# Patient Record
Sex: MALE | Race: WHITE | NOT HISPANIC OR LATINO | URBAN - METROPOLITAN AREA
[De-identification: names, ages, dates, MRNs, and addresses within clinical notes are randomized per-mention and may not be internally consistent; named-entity substitution may affect disease eponyms.]

---

## 2017-01-31 ENCOUNTER — ON CAMPUS - OUTPATIENT (AMBULATORY)
Dept: URBAN - METROPOLITAN AREA HOSPITAL 2 | Facility: HOSPITAL | Age: 72
End: 2017-01-31
Payer: COMMERCIAL

## 2017-01-31 ENCOUNTER — OFFICE (AMBULATORY)
Dept: URBAN - METROPOLITAN AREA CLINIC 64 | Facility: CLINIC | Age: 72
End: 2017-01-31
Payer: COMMERCIAL

## 2017-01-31 VITALS
SYSTOLIC BLOOD PRESSURE: 112 MMHG | SYSTOLIC BLOOD PRESSURE: 144 MMHG | SYSTOLIC BLOOD PRESSURE: 121 MMHG | WEIGHT: 200 LBS | SYSTOLIC BLOOD PRESSURE: 157 MMHG | DIASTOLIC BLOOD PRESSURE: 85 MMHG | DIASTOLIC BLOOD PRESSURE: 77 MMHG | HEART RATE: 86 BPM | OXYGEN SATURATION: 97 % | OXYGEN SATURATION: 98 % | SYSTOLIC BLOOD PRESSURE: 113 MMHG | OXYGEN SATURATION: 99 % | DIASTOLIC BLOOD PRESSURE: 75 MMHG | DIASTOLIC BLOOD PRESSURE: 82 MMHG | SYSTOLIC BLOOD PRESSURE: 125 MMHG | RESPIRATION RATE: 16 BRPM | HEART RATE: 78 BPM | HEART RATE: 72 BPM | TEMPERATURE: 97.6 F | HEART RATE: 77 BPM | RESPIRATION RATE: 18 BRPM | DIASTOLIC BLOOD PRESSURE: 83 MMHG | RESPIRATION RATE: 14 BRPM | DIASTOLIC BLOOD PRESSURE: 68 MMHG | OXYGEN SATURATION: 96 % | DIASTOLIC BLOOD PRESSURE: 74 MMHG | SYSTOLIC BLOOD PRESSURE: 132 MMHG | HEART RATE: 84 BPM | HEART RATE: 79 BPM | SYSTOLIC BLOOD PRESSURE: 110 MMHG | HEART RATE: 90 BPM | HEART RATE: 94 BPM | HEIGHT: 70 IN

## 2017-01-31 DIAGNOSIS — D12.2 BENIGN NEOPLASM OF ASCENDING COLON: ICD-10-CM

## 2017-01-31 DIAGNOSIS — Z86.010 PERSONAL HISTORY OF COLONIC POLYPS: ICD-10-CM

## 2017-01-31 DIAGNOSIS — K64.8 OTHER HEMORRHOIDS: ICD-10-CM

## 2017-01-31 DIAGNOSIS — K57.30 DIVERTICULOSIS OF LARGE INTESTINE WITHOUT PERFORATION OR ABS: ICD-10-CM

## 2017-01-31 LAB
GI HISTOLOGY: A. UNSPECIFIED: (no result)
GI HISTOLOGY: PDF REPORT: (no result)

## 2017-01-31 PROCEDURE — 45385 COLONOSCOPY W/LESION REMOVAL: CPT | Mod: 33 | Performed by: INTERNAL MEDICINE

## 2017-01-31 PROCEDURE — 88305 TISSUE EXAM BY PATHOLOGIST: CPT | Performed by: INTERNAL MEDICINE

## 2017-01-31 RX ADMIN — PROPOFOL: 10 INJECTION, EMULSION INTRAVENOUS at 14:25

## 2017-05-04 ENCOUNTER — HOSPITAL ENCOUNTER (OUTPATIENT)
Dept: ORTHOPEDIC SURGERY | Facility: CLINIC | Age: 72
Discharge: HOME | End: 2017-05-04
Attending: ORTHOPAEDIC SURGERY | Admitting: ORTHOPAEDIC SURGERY

## 2017-05-31 ENCOUNTER — HOSPITAL ENCOUNTER (OUTPATIENT)
Dept: ORTHOPEDIC SURGERY | Facility: CLINIC | Age: 72
Discharge: HOME | End: 2017-05-31
Attending: ORTHOPAEDIC SURGERY | Admitting: ORTHOPAEDIC SURGERY

## 2017-06-06 ENCOUNTER — HOSPITAL ENCOUNTER (OUTPATIENT)
Dept: CARDIOLOGY | Facility: HOSPITAL | Age: 72
Discharge: HOME | End: 2017-06-06
Attending: ORTHOPAEDIC SURGERY | Admitting: ORTHOPAEDIC SURGERY

## 2017-06-06 LAB
ANION GAP SERPL CALC-SCNC: 12.9 MMOL/L (ref 10–20)
BACTERIA SPEC AEROBE CULT: NORMAL
BASOPHILS # BLD AUTO: 0.1 10*3/UL (ref 0–0.2)
BASOPHILS NFR BLD AUTO: 2 % (ref 0–2)
BUN SERPL-MCNC: 20 MG/DL (ref 8–20)
BUN/CREAT SERPL: 16.7 (ref 6.2–20.3)
CALCIUM SERPL-MCNC: 9.5 MG/DL (ref 8.9–10.3)
CHLORIDE SERPL-SCNC: 100 MMOL/L (ref 101–111)
CONV CO2: 28 MMOL/L (ref 22–32)
CREAT UR-MCNC: 1.2 MG/DL (ref 0.7–1.2)
DIFFERENTIAL METHOD BLD: (no result)
EOSINOPHIL # BLD AUTO: 0.1 10*3/UL (ref 0–0.3)
EOSINOPHIL # BLD AUTO: 2 % (ref 0–3)
ERYTHROCYTE [DISTWIDTH] IN BLOOD BY AUTOMATED COUNT: 14.8 % (ref 11.5–14.5)
GLUCOSE SERPL-MCNC: 107 MG/DL (ref 65–99)
HCT VFR BLD AUTO: 47.1 % (ref 40–54)
HGB BLD-MCNC: 16.2 G/DL (ref 14–18)
LYMPHOCYTES # BLD AUTO: 1.4 10*3/UL (ref 0.8–4.8)
LYMPHOCYTES NFR BLD AUTO: 27 % (ref 18–42)
Lab: NORMAL
MCH RBC QN AUTO: 31.5 PG (ref 26–32)
MCHC RBC AUTO-ENTMCNC: 34.3 G/DL (ref 32–36)
MCV RBC AUTO: 91.8 FL (ref 80–94)
MICRO REPORT STATUS: NORMAL
MONOCYTES # BLD AUTO: 0.5 10*3/UL (ref 0.1–1.3)
MONOCYTES NFR BLD AUTO: 10 % (ref 2–11)
NEUTROPHILS # BLD AUTO: 3.1 10*3/UL (ref 2.3–8.6)
NEUTROPHILS NFR BLD AUTO: 59 % (ref 50–75)
NRBC BLD AUTO-RTO: 0 /100{WBCS}
NRBC/RBC NFR BLD MANUAL: 0 10*3/UL
PLATELET # BLD AUTO: 116 10*3/UL (ref 150–450)
PMV BLD AUTO: 8.9 FL (ref 7.4–10.4)
POTASSIUM SERPL-SCNC: 4.9 MMOL/L (ref 3.6–5.1)
RBC # BLD AUTO: 5.13 10*6/UL (ref 4.6–6)
SODIUM SERPL-SCNC: 136 MMOL/L (ref 136–144)
SPECIMEN SOURCE: NORMAL
WBC # BLD AUTO: 5.2 10*3/UL (ref 4.5–11.5)

## 2017-06-09 ENCOUNTER — HOSPITAL ENCOUNTER (OUTPATIENT)
Dept: PREOP | Facility: HOSPITAL | Age: 72
Setting detail: HOSPITAL OUTPATIENT SURGERY
Discharge: HOME | End: 2017-06-09
Attending: ORTHOPAEDIC SURGERY | Admitting: ORTHOPAEDIC SURGERY

## 2017-08-14 ENCOUNTER — HOSPITAL ENCOUNTER (OUTPATIENT)
Dept: ONCOLOGY | Facility: CLINIC | Age: 72
Discharge: HOME | End: 2017-08-14
Attending: FAMILY MEDICINE | Admitting: FAMILY MEDICINE

## 2017-10-10 ENCOUNTER — CONVERSION ENCOUNTER (OUTPATIENT)
Dept: ORTHOPEDIC SURGERY | Facility: CLINIC | Age: 72
End: 2017-10-10

## 2017-11-02 ENCOUNTER — HOSPITAL ENCOUNTER (OUTPATIENT)
Dept: OTHER | Facility: HOSPITAL | Age: 72
Discharge: HOME | End: 2017-11-02
Attending: FAMILY MEDICINE | Admitting: FAMILY MEDICINE

## 2018-02-19 ENCOUNTER — HOSPITAL ENCOUNTER (OUTPATIENT)
Dept: OTHER | Facility: HOSPITAL | Age: 73
Discharge: HOME | End: 2018-02-19
Attending: INTERNAL MEDICINE | Admitting: INTERNAL MEDICINE

## 2018-02-20 ENCOUNTER — HOSPITAL ENCOUNTER (OUTPATIENT)
Dept: OTHER | Facility: HOSPITAL | Age: 73
Discharge: HOME | End: 2018-02-20
Attending: INTERNAL MEDICINE | Admitting: INTERNAL MEDICINE

## 2018-05-21 ENCOUNTER — HOSPITAL ENCOUNTER (OUTPATIENT)
Dept: ORTHOPEDIC SURGERY | Facility: CLINIC | Age: 73
Discharge: HOME | End: 2018-05-21
Attending: ORTHOPAEDIC SURGERY | Admitting: ORTHOPAEDIC SURGERY

## 2018-08-28 ENCOUNTER — HOSPITAL ENCOUNTER (OUTPATIENT)
Dept: OTHER | Facility: HOSPITAL | Age: 73
Discharge: HOME | End: 2018-08-28
Attending: ORTHOPAEDIC SURGERY | Admitting: ORTHOPAEDIC SURGERY

## 2018-08-28 LAB
ANION GAP SERPL CALC-SCNC: 13.3 MMOL/L (ref 10–20)
BACTERIA SPEC AEROBE CULT: NORMAL
BASOPHILS # BLD AUTO: 0.1 10*3/UL (ref 0–0.2)
BASOPHILS NFR BLD AUTO: 2 % (ref 0–2)
BUN SERPL-MCNC: 16 MG/DL (ref 8–20)
BUN/CREAT SERPL: 14.5 (ref 6.2–20.3)
CALCIUM SERPL-MCNC: 9.8 MG/DL (ref 8.9–10.3)
CHLORIDE SERPL-SCNC: 102 MMOL/L (ref 101–111)
CONV CO2: 27 MMOL/L (ref 22–32)
CREAT UR-MCNC: 1.1 MG/DL (ref 0.7–1.2)
DIFFERENTIAL METHOD BLD: (no result)
EOSINOPHIL # BLD AUTO: 0.1 10*3/UL (ref 0–0.3)
EOSINOPHIL # BLD AUTO: 3 % (ref 0–3)
ERYTHROCYTE [DISTWIDTH] IN BLOOD BY AUTOMATED COUNT: 14.5 % (ref 11.5–14.5)
GLUCOSE SERPL-MCNC: 86 MG/DL (ref 65–99)
HCT VFR BLD AUTO: 44.5 % (ref 40–54)
HGB BLD-MCNC: 15.7 G/DL (ref 14–18)
LYMPHOCYTES # BLD AUTO: 1.5 10*3/UL (ref 0.8–4.8)
LYMPHOCYTES NFR BLD AUTO: 30 % (ref 18–42)
Lab: NORMAL
MCH RBC QN AUTO: 32.2 PG (ref 26–32)
MCHC RBC AUTO-ENTMCNC: 35.2 G/DL (ref 32–36)
MCV RBC AUTO: 91.5 FL (ref 80–94)
MICRO REPORT STATUS: NORMAL
MONOCYTES # BLD AUTO: 0.7 10*3/UL (ref 0.1–1.3)
MONOCYTES NFR BLD AUTO: 13 % (ref 2–11)
NEUTROPHILS # BLD AUTO: 2.6 10*3/UL (ref 2.3–8.6)
NEUTROPHILS NFR BLD AUTO: 52 % (ref 50–75)
NRBC BLD AUTO-RTO: 0 /100{WBCS}
NRBC/RBC NFR BLD MANUAL: 0 10*3/UL
PLATELET # BLD AUTO: 123 10*3/UL (ref 150–450)
PMV BLD AUTO: 9.3 FL (ref 7.4–10.4)
POTASSIUM SERPL-SCNC: 4.3 MMOL/L (ref 3.6–5.1)
RBC # BLD AUTO: 4.87 10*6/UL (ref 4.6–6)
SODIUM SERPL-SCNC: 138 MMOL/L (ref 136–144)
SPECIMEN SOURCE: NORMAL
WBC # BLD AUTO: 5 10*3/UL (ref 4.5–11.5)

## 2018-09-18 ENCOUNTER — HOSPITAL ENCOUNTER (OUTPATIENT)
Dept: PREOP | Facility: HOSPITAL | Age: 73
Setting detail: HOSPITAL OUTPATIENT SURGERY
Discharge: HOME | End: 2018-09-18
Attending: ORTHOPAEDIC SURGERY | Admitting: ORTHOPAEDIC SURGERY

## 2018-11-27 ENCOUNTER — HOSPITAL ENCOUNTER (OUTPATIENT)
Dept: ORTHOPEDIC SURGERY | Facility: CLINIC | Age: 73
Discharge: HOME | End: 2018-11-27
Attending: PHYSICIAN ASSISTANT | Admitting: PHYSICIAN ASSISTANT

## 2019-01-31 ENCOUNTER — HOSPITAL ENCOUNTER (OUTPATIENT)
Dept: ORTHOPEDIC SURGERY | Facility: CLINIC | Age: 74
Discharge: HOME | End: 2019-01-31
Attending: PHYSICIAN ASSISTANT | Admitting: PHYSICIAN ASSISTANT

## 2019-02-11 ENCOUNTER — HOSPITAL ENCOUNTER (OUTPATIENT)
Dept: PHYSICAL THERAPY | Facility: HOSPITAL | Age: 74
Setting detail: RECURRING SERIES
Discharge: HOME | End: 2019-04-01
Attending: PHYSICIAN ASSISTANT | Admitting: PHYSICIAN ASSISTANT

## 2019-05-21 ENCOUNTER — CONVERSION ENCOUNTER (OUTPATIENT)
Dept: ORTHOPEDIC SURGERY | Facility: CLINIC | Age: 74
End: 2019-05-21

## 2019-05-21 LAB
ALBUMIN SERPL-MCNC: 4.4 G/DL (ref 3.6–5.1)
ALBUMIN/GLOB SERPL: ABNORMAL {RATIO} (ref 1–2.5)
ALP SERPL-CCNC: 64 UNITS/L (ref 40–115)
ALT SERPL-CCNC: 26 UNITS/L (ref 9–46)
AST SERPL-CCNC: 27 UNITS/L (ref 10–35)
BILIRUB SERPL-MCNC: 0.5 MG/DL (ref 0.2–1.2)
BUN SERPL-MCNC: 18 MG/DL (ref 7–25)
BUN/CREAT SERPL: ABNORMAL (ref 6–22)
CALCIUM SERPL-MCNC: 9.4 MG/DL (ref 8.6–10.3)
CHLORIDE SERPL-SCNC: 104 MMOL/L (ref 98–110)
CO2 CONTENT VENOUS: 25 MMOL/L (ref 20–32)
CONV TOTAL PROTEIN: 7.1 G/DL (ref 6.1–8.1)
CREAT UR-MCNC: 1 MG/DL (ref 0.7–1.18)
GLOBULIN UR ELPH-MCNC: ABNORMAL G/DL (ref 1.9–3.7)
GLUCOSE SERPL-MCNC: 64 MG/DL (ref 65–99)
POTASSIUM SERPL-SCNC: 4.3 MMOL/L (ref 3.5–5.3)
SODIUM SERPL-SCNC: 139 MMOL/L (ref 135–146)

## 2019-06-07 ENCOUNTER — HOSPITAL ENCOUNTER (OUTPATIENT)
Dept: ONCOLOGY | Facility: HOSPITAL | Age: 74
Discharge: HOME | End: 2019-06-07
Attending: FAMILY MEDICINE | Admitting: FAMILY MEDICINE

## 2019-06-25 ENCOUNTER — OFFICE VISIT (OUTPATIENT)
Dept: ORTHOPEDIC SURGERY | Facility: CLINIC | Age: 74
End: 2019-06-25

## 2019-06-25 VITALS
SYSTOLIC BLOOD PRESSURE: 107 MMHG | HEART RATE: 70 BPM | HEIGHT: 71 IN | WEIGHT: 206 LBS | BODY MASS INDEX: 28.84 KG/M2 | DIASTOLIC BLOOD PRESSURE: 68 MMHG

## 2019-06-25 DIAGNOSIS — M25.561 ACUTE PAIN OF RIGHT KNEE: Primary | ICD-10-CM

## 2019-06-25 DIAGNOSIS — M17.12 PRIMARY OSTEOARTHRITIS OF LEFT KNEE: ICD-10-CM

## 2019-06-25 DIAGNOSIS — Z96.651 STATUS POST TOTAL KNEE REPLACEMENT, RIGHT: ICD-10-CM

## 2019-06-25 PROCEDURE — 99213 OFFICE O/P EST LOW 20 MIN: CPT | Performed by: ORTHOPAEDIC SURGERY

## 2019-06-25 RX ORDER — METOPROLOL TARTRATE 50 MG/1
TABLET, FILM COATED ORAL EVERY 12 HOURS
COMMUNITY
Start: 2018-07-02 | End: 2019-11-18 | Stop reason: SDUPTHER

## 2019-06-25 RX ORDER — MINOXIDIL 2 %
SOLUTION, NON-ORAL TOPICAL
COMMUNITY
Start: 2014-11-13 | End: 2019-07-12

## 2019-06-25 RX ORDER — LEVOTHYROXINE SODIUM 0.2 MG/1
TABLET ORAL EVERY 24 HOURS
COMMUNITY
Start: 2018-03-10 | End: 2019-11-18 | Stop reason: SDUPTHER

## 2019-06-25 RX ORDER — TRAMADOL HYDROCHLORIDE 50 MG/1
50 TABLET ORAL EVERY 6 HOURS PRN
COMMUNITY
End: 2019-06-25 | Stop reason: SDUPTHER

## 2019-06-25 RX ORDER — DUTASTERIDE 0.5 MG/1
1 CAPSULE, LIQUID FILLED ORAL EVERY 24 HOURS
COMMUNITY
Start: 2018-09-17 | End: 2020-03-18 | Stop reason: SDUPTHER

## 2019-06-25 RX ORDER — TAMSULOSIN HYDROCHLORIDE 0.4 MG/1
0.4 CAPSULE ORAL DAILY
COMMUNITY
End: 2019-11-18 | Stop reason: SDUPTHER

## 2019-06-25 RX ORDER — ATORVASTATIN CALCIUM 40 MG/1
40 TABLET, FILM COATED ORAL DAILY
COMMUNITY
End: 2019-07-10

## 2019-06-25 RX ORDER — TRAMADOL HYDROCHLORIDE 50 MG/1
50 TABLET ORAL
Qty: 40 TABLET | Refills: 0 | OUTPATIENT
Start: 2019-06-25 | End: 2019-11-06

## 2019-06-25 RX ORDER — GABAPENTIN 400 MG/1
300 CAPSULE ORAL
COMMUNITY
End: 2019-07-10

## 2019-06-25 NOTE — PROGRESS NOTES
Chief Complaint   Patient presents with   • Right Knee - Follow-up   • Follow-up     right TKR 1/16/19           HPI the patient is following up on right total knee arthroplasty which was performed on 2 January 2019.  The patient initially had quite a bit of pain and discomfort on the medial aspect of the knee.  Things are gotten progressively better.  His range of motion has definitely improved with physical therapy.  He feels a lot better than before.  He still has quite a bit of pain especially at night but this pain wakes him up and causes him to have a sense of stiffness.  I have reassured the patient that this is indeed an normal consequent after major surgery such as knee replacement.  His mobility has improved a lot and he is able to walk outside and exercise without any difficulty.  He was very dissatisfied with the physical therapy that he received after the surgical intervention.  He feels that he was turned over to a student who really did not know the details of the physical therapy protocol and therefore he has taken a longer time during his recovery process.  He is a former runner and states that his left knee has also started to bother him to some degree.  I have recommended to the patient that we should wait prior to knee replacement surgery on the left side for as long as possible.  At this point I do not feel that he is symptomatic enough to require surgical consideration on his left knee.  I would most likely recommend nonoperative management to his radiograph findings become more involved and he is clinically hurting more persistently all the time.  We have discussed intra-articular steroid injections as well as Visco supplementation for the joint for management of his symptoms but.        Vitals:    06/25/19 1244   BP: 107/68   Pulse: 70           Review of Systems   Constitutional: Negative.    HENT: Negative.    Eyes: Negative.    Respiratory: Negative.    Cardiovascular: Negative.     Gastrointestinal: Negative.    Endocrine: Negative.    Genitourinary: Negative.    Musculoskeletal: Positive for gait problem and joint swelling.   Skin: Negative.    Allergic/Immunologic: Negative.    Hematological: Negative.    Psychiatric/Behavioral: Negative.            Physical Exam   Constitutional: He is oriented to person, place, and time. He appears well-nourished.   HENT:   Head: Atraumatic.   Eyes: EOM are normal.   Neck: Neck supple.   Cardiovascular: Normal heart sounds and intact distal pulses.   Pulmonary/Chest: Effort normal and breath sounds normal.   Abdominal: Bowel sounds are normal.   Musculoskeletal: He exhibits edema and tenderness.   Neurological: He is alert and oriented to person, place, and time.   Skin: Skin is warm. Capillary refill takes 2 to 3 seconds.   Psychiatric: He has a normal mood and affect. His behavior is normal.   Nursing note and vitals reviewed.          Joint/Body Part Specific Exam:right knee.The patient is status post total knee arthroplasty postoperative 6 month(s). Incision is clean. Calf is soft and nontender. Homans sign is negative. There is no clicking, popping or catching. Anterior and posterior drawer signs are negative.  There is no instability of the components. Appropriate amounts of swelling and bruising are noted. Dorsalis pedis and posterior tibial artery pulses are palpable. Common peroneal nerve function is well preserved. Range of motion is from 0-125 degrees of flexion. Gait is cautious but otherwise fairly normal. There is no evidence of a deep seated joint infection.    Left knee. Patient has crepitus throughout range of motion. Positive patellar grind test. Mild effusion. Lachman is negative. Pivot shift is negative. Anterior and posterior drawer signs are negative. Significant joint line tenderness is noted on the medial aspect of the knee. Patient has a varus orientation of the knee. There is fullness and tenderness in the Popliteal fossa. Mild  distention of a Popliteal cyst is noted in this location. Range of motion in flexion is from 0-125 degrees. Neurovascular status is intact.  Dorsalis pedis and posterior tibial artery pulses are palpable. Common peroneal nerve function is well preserved. Patient's gait is cautious and antalgic. Skin and soft tissues are mildly swollen, consistent with synovitis and effusion. The patient has a significant limp with the first few steps after starting the gait cycle. Getting out of a chair takes a lot of effort due to pain on knee flexion.    X-RAY Report:bilateral Knee X-Ray  Indication: Evaluation of pain on the medial aspect of the left knee and evaluation of the implant on the right knee following total knee replacement.  AP, Lateral views  Findings: Well placed implants with a good cement mantle without any subsidence of the implants.  Early degenerative change of the medial compartment of the left knee.  no bony lesion  Soft tissues within normal limits  decreased joint spaces  Hardware appropriately positioned yes      yes prior studies available for comparison.    This patient's x-ray report was graded according to the Kellgren and Valentin classification.  This took into account the joint space narrowing, osteophyte formation, sclerosis of the distal femur/proximal tibia along with deformity of those bones.  The findings were indicative of K L grade 2 on the left knee.    X-RAY was ordered and reviewed by Ashish Crain MD        Diagnostics:        Shai was seen today for follow-up and follow-up.    Diagnoses and all orders for this visit:    Acute pain of right knee  -     XR Knee 3 View Right    Status post total knee replacement, right    Primary osteoarthritis of left knee    Other orders  -     traMADol (ULTRAM) 50 MG tablet; Take 1 tablet by mouth every night at bedtime.            Procedures        Plan: Stretching something exercises of the quad and hamstring.    Use a supportive brace to the knee to  prevent it from buckling and giving out.    Ice to the knee for comfort.    Calcium and vitamin D for bone health.    Intra-articular steroid injection and Synvisc Visco supplementation discussed with the patient for management of left knee symptoms.    , GI and dental procedure protected with antibiotic to prevent metastatic infection to the right knee otherwise implants.    He will eventually need sided knee replacement but that would be a last resort in terms of surgical intervention.    Follow-up in my office in 6-month for evaluation.    Tablet  IbuProfen 600 g orally daily for pain and discomfort    Tablet Ultram 50 mg tab 1 p.o. nightly for nighttime rectal pain and discomfort.    Controlled substance treatment options discussed in detail. Patient's signed consent to medical options on file. GAVIN form in chart.  The patient is being provided this narcotic prescription to address the acute medical condition that they are undergoing/experiencing at this time.  It is my medical and surgical assessment that more than 3 days of narcotic medication is necessary to help control the pain and discomfort that this patient is experiencing at this point.  Risks of narcotic medication usage outlined.  Possibility of physical and psychological dependence and abuse, especially long term, emphasized to the patient.  I have explained to the patient that we will try to wean them off the narcotic medication as soon as possible and introduce non-narcotic modalities of pain control.  At this point in the patient's clinical spectrum, however, alternative available treatments are inadequate to control their pain and symptoms.  I have also discussed the possibility of random drug testing as well as pill counts to prevent misuse and misappropriation of the narcotic medications prescribed to the patient.

## 2019-07-12 ENCOUNTER — OFFICE VISIT (OUTPATIENT)
Dept: FAMILY MEDICINE CLINIC | Facility: CLINIC | Age: 74
End: 2019-07-12

## 2019-07-12 VITALS
DIASTOLIC BLOOD PRESSURE: 78 MMHG | HEART RATE: 70 BPM | RESPIRATION RATE: 18 BRPM | OXYGEN SATURATION: 95 % | HEIGHT: 71 IN | SYSTOLIC BLOOD PRESSURE: 135 MMHG | TEMPERATURE: 98.1 F | BODY MASS INDEX: 28.9 KG/M2 | WEIGHT: 206.4 LBS

## 2019-07-12 DIAGNOSIS — M54.81 OCCIPITAL NEURALGIA OF LEFT SIDE: Primary | ICD-10-CM

## 2019-07-12 DIAGNOSIS — M54.81 OCCIPITAL NEURALGIA OF LEFT SIDE: ICD-10-CM

## 2019-07-12 PROCEDURE — 99213 OFFICE O/P EST LOW 20 MIN: CPT | Performed by: FAMILY MEDICINE

## 2019-07-12 RX ORDER — PREDNISONE 10 MG/1
TABLET ORAL
Qty: 40 TABLET | Refills: 0 | Status: SHIPPED | OUTPATIENT
Start: 2019-07-12 | End: 2019-11-06

## 2019-07-12 RX ORDER — BACLOFEN 10 MG/1
10 TABLET ORAL 3 TIMES DAILY
Qty: 90 TABLET | Refills: 0 | Status: SHIPPED | OUTPATIENT
Start: 2019-07-12 | End: 2019-08-08 | Stop reason: SDUPTHER

## 2019-07-12 RX ORDER — BACLOFEN 10 MG/1
TABLET ORAL
Qty: 270 TABLET | Refills: 0 | OUTPATIENT
Start: 2019-07-12

## 2019-07-12 NOTE — PROGRESS NOTES
Headache    This is a new problem. The current episode started 1 to 4 weeks ago. The problem occurs constantly. The problem has been unchanged. The pain is located in the occipital region. The pain quality is not similar to prior headaches. The quality of the pain is described as squeezing and pulsating. The pain is moderate. Pertinent negatives include no back pain, coughing or dizziness. The treatment provided mild relief.     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 However diet modification and compliance with medication is discussed with the patient. Patient is advised to have periodic AST,  ALT, and lipid panel testing through your office.    • Hypertension 05/01/2013    Benign. 12-14-18- patient's blood pressure is within desireable ranges. At this time, I would not recommend any change in antihypertensive regimen. However, patient is advised to check the blood pressure periodically at home and bring the logbook on next visit. Patient is also encouraged to increase aerobic activities as tolerated. Risk factor modification is discussed.    • Hypothyroidism    • Hypothyroidism 05/01/2013   • Impotence of organic origin    • Insomnia    • Internal derangement of knee 05/05/2014 6-9-14- He saw Dr. Sams and Dr. Concepcion. He had fluid drained from his knee with negative cultures and negative crystals. Both were not concerned about the MRI (report not avilable), but he has a radiologist friend who read it as having meniscal damage. He is offered referral back to the orthopedist of his choice and will thnk about who he wants to see. He has upcoming shoulder surgery.    • Knee pain, right 05/04/2017   • Knee pain, right 04/03/2017   • Low back pain    • Medication monitoring encounter 04/19/2019   • Meningioma (CMS/HCC) 06/09/2014   • Nasal polyp 10/06/2017   • Neoplasm of uncertain behavior 10/11/2018   • Neoplasm of uncertain behavior of skin 05/04/2015   • Neuropathy 12/18/2014   • No known drug allergy    • Obesity    • Orthopedic aftercare 06/12/2017   • Overweight 12/12/2017   • Pain in joint of right ankle 05/31/2017   • Pain management     [place] [facility]    • Palpitations 12/22/2017    3-16-18- his symptom of palpitation have improved. Continue beta-blockers.    • Pre-op exam 12/18/2018   • Prostatic hypertrophy 06/09/2014    Benign   • Puncture wound without foreign body of left hand, initial encounter 10/16/2018   • Rash 06/28/2017   • Rotator cuff tear 05/19/2014    left   • Shortness of breath 02/02/2018   • Sinus tachycardia 03/16/2018 12-14-18- much better. Continue current treat.     • Tobacco use      Past Surgical History:   Procedure Laterality Date   • ANKLE HARDWARE REMOVAL Right    • APPENDECTOMY     • KNEE ARTHROSCOPY  06/09/2017    and 12/5/2014. Dr Gallego. With partial medial and lateral miniscectomy.    • KNEE ARTHROSCOPY Right 06/09/2017   • KNEE ARTHROSCOPY W/ MENISCECTOMY Right 09/18/2018    right knee scope/meniscectomy   • LUMBAR DISC SURGERY  11/11/2015    Right L3-4 diskectomy. Dr. Pollard.    • ROTATOR CUFF REPAIR  07/2014    Dr Gallego.   • TONSILLECTOMY     • TOTAL KNEE ARTHROPLASTY Right 01/16/2019    Right TKR   • TOTAL KNEE ARTHROPLASTY Right 01/16/2019    Dr. Crain     Family History   Problem Relation Age of Onset   • Diabetes Mother    • Stroke Mother    • Other Mother         Thyroid Disease   • Heart disease Mother         Ischemic   • Hypertension Father    • Heart disease Father         Ischemic   • Hyperlipidemia Father      Social History     Tobacco Use   • Smoking status: Former Smoker   • Smokeless tobacco: Never Used   Substance Use Topics   • Alcohol use: Yes     Review of Systems   Constitutional: Negative for chills and fatigue.   Respiratory: Negative for cough and shortness of breath.    Cardiovascular: Negative for chest pain and palpitations.   Musculoskeletal: Negative for arthralgias, back pain and myalgias.   Skin: Negative for rash.   Neurological: Positive for headache. Negative for dizziness.     Physical Exam   Constitutional: He is oriented to person, place, and time. He appears well-developed and well-nourished. No distress.   Cardiovascular: Normal rate, regular rhythm and normal heart sounds.   No murmur heard.  Pulmonary/Chest: Effort normal and breath sounds normal. He has no wheezes. He has no rales.   Abdominal: Soft. Bowel sounds are normal. He exhibits no distension.   Musculoskeletal: Normal range of motion.   Neurological: He is alert and oriented to person, place, and time. He has normal strength. No cranial nerve deficit or sensory  deficit. He displays a negative Romberg sign. Coordination and gait normal.   Skin: Skin is warm and dry.   Psychiatric: He has a normal mood and affect. His behavior is normal.     No visits with results within 7 Day(s) from this visit.   Latest known visit with results is:   Conversion Encounter on 05/21/2019   Component Date Value Ref Range Status   • Albumin 05/21/2019 4.4  3.6 - 5.1 g/dL Final   • Alkaline Phosphatase 05/21/2019 64  40 - 115 units/L Final   • Total Bilirubin 05/21/2019 0.5  0.2 - 1.2 mg/dL Final   • BUN 05/21/2019 18  7 - 25 mg/dL Final   • BUN/Creatinine Ratio 05/21/2019 NOT APPLICABLE (calc)  6 - 22 Final   • Calcium 05/21/2019 9.4  8.6 - 10.3 mg/dL Final   • Chloride 05/21/2019 104  98 - 110 mmol/L Final   • CO2 CONTENT  05/21/2019 25  20 - 32 mmol/L Final   • Creatinine 05/21/2019 1.00  0.70 - 1.18 mg/dL Final   • eGFR African Am 05/21/2019 75  > OR = 60 mL/min/1.73m2 Final   • eGFR Non African Am 05/21/2019 87  > OR = 60 mL/min/1.73m2 Final   • Globulin 05/21/2019 2.7 G/DL (CALC)  1.9 - 3.7 g/dL Final   • Glucose 05/21/2019 64* 65 - 99 mg/dL Final   • Potassium 05/21/2019 4.3  3.5 - 5.3 mmol/L Final   • Total Protein 05/21/2019 7.1  6.1 - 8.1 g/dL Final   • AST (SGOT) 05/21/2019 27  10 - 35 units/L Final   • ALT (SGPT) 05/21/2019 26  9 - 46 units/L Final   • Sodium 05/21/2019 139  135 - 146 mmol/L Final   • A/G Ratio 05/21/2019 1.6 (calc)  1.0 - 2.5 Final         Diagnoses and all orders for this visit:    1. Occipital neuralgia of left side (Primary)  Assessment & Plan:  Will start steroids and muscle relaxers and encouraged him to continue heat with massage. Warning signs discussed and patient was instructed on how to reach me after hours and when to seek urgent or emergent care.     Orders:  -     baclofen (LIORESAL) 10 MG tablet; Take 1 tablet by mouth 3 (Three) Times a Day.  Dispense: 90 tablet; Refill: 0  -     predniSONE (DELTASONE) 10 MG tablet; Take 4 po qam x 4d, then 3 po qam x  4d, then 2 po qam x 4 d, then 1 po qam x 4 d  Dispense: 40 tablet; Refill: 0

## 2019-07-14 PROBLEM — M23.303 DERANGEMENT OF MEDIAL MENISCUS OF RIGHT KNEE: Status: ACTIVE | Noted: 2017-05-31

## 2019-07-14 PROBLEM — R13.10 DYSPHAGIA: Status: ACTIVE | Noted: 2017-11-01

## 2019-07-14 PROBLEM — M19.91 PRIMARY LOCALIZED OSTEOARTHRITIS: Status: ACTIVE | Noted: 2018-03-26

## 2019-07-14 PROBLEM — E66.3 OVERWEIGHT: Status: ACTIVE | Noted: 2017-12-12

## 2019-07-14 PROBLEM — J33.9 NASAL POLYP: Status: ACTIVE | Noted: 2017-10-06

## 2019-07-14 PROBLEM — Z96.651 PRESENCE OF RIGHT ARTIFICIAL KNEE JOINT: Status: ACTIVE | Noted: 2019-01-24

## 2019-07-14 NOTE — ASSESSMENT & PLAN NOTE
Will start steroids and muscle relaxers and encouraged him to continue heat with massage. Warning signs discussed and patient was instructed on how to reach me after hours and when to seek urgent or emergent care.

## 2019-08-08 DIAGNOSIS — M54.81 OCCIPITAL NEURALGIA OF LEFT SIDE: ICD-10-CM

## 2019-08-08 RX ORDER — BACLOFEN 10 MG/1
TABLET ORAL
Qty: 90 TABLET | Refills: 0 | Status: SHIPPED | OUTPATIENT
Start: 2019-08-08 | End: 2019-11-06

## 2019-08-13 RX ORDER — TRAMADOL HYDROCHLORIDE 50 MG/1
TABLET ORAL
Qty: 40 TABLET | Refills: 0 | OUTPATIENT
Start: 2019-08-13

## 2019-08-28 ENCOUNTER — TELEPHONE (OUTPATIENT)
Dept: FAMILY MEDICINE CLINIC | Facility: CLINIC | Age: 74
End: 2019-08-28

## 2019-08-29 DIAGNOSIS — E78.5 HYPERLIPIDEMIA, UNSPECIFIED HYPERLIPIDEMIA TYPE: Primary | ICD-10-CM

## 2019-08-29 DIAGNOSIS — E78.5 HYPERLIPIDEMIA, UNSPECIFIED HYPERLIPIDEMIA TYPE: ICD-10-CM

## 2019-08-30 ENCOUNTER — TELEPHONE (OUTPATIENT)
Dept: FAMILY MEDICINE CLINIC | Facility: CLINIC | Age: 74
End: 2019-08-30

## 2019-08-30 DIAGNOSIS — R53.83 FATIGUE, UNSPECIFIED TYPE: Primary | ICD-10-CM

## 2019-08-30 DIAGNOSIS — Z12.5 SCREENING PSA (PROSTATE SPECIFIC ANTIGEN): ICD-10-CM

## 2019-08-30 DIAGNOSIS — E78.5 HYPERLIPIDEMIA, UNSPECIFIED HYPERLIPIDEMIA TYPE: ICD-10-CM

## 2019-09-04 LAB
CHOLEST SERPL-MCNC: 163 MG/DL (ref 100–199)
HDLC SERPL-MCNC: 35 MG/DL
LDLC SERPL CALC-MCNC: 87 MG/DL (ref 0–99)
PSA SERPL-MCNC: 0.7 NG/ML (ref 0–4)
TESTOST FREE SERPL-MCNC: 22.9 PG/ML (ref 6.6–18.1)
TESTOST SERPL-MCNC: 250.5 NG/DL (ref 264–916)
TRIGL SERPL-MCNC: 206 MG/DL (ref 0–149)
VLDLC SERPL CALC-MCNC: 41 MG/DL (ref 5–40)

## 2019-09-13 ENCOUNTER — TELEPHONE (OUTPATIENT)
Dept: FAMILY MEDICINE CLINIC | Facility: CLINIC | Age: 74
End: 2019-09-13

## 2019-10-14 RX ORDER — GABAPENTIN 300 MG/1
CAPSULE ORAL
Qty: 450 CAPSULE | Refills: 3 | Status: SHIPPED | OUTPATIENT
Start: 2019-10-14 | End: 2020-12-16 | Stop reason: SDUPTHER

## 2019-10-15 PROBLEM — S82.409A FRACTURE OF FIBULA: Status: ACTIVE | Noted: 2019-10-15

## 2019-10-15 PROBLEM — E16.2 HYPOGLYCEMIA: Status: ACTIVE | Noted: 2019-10-15

## 2019-10-15 PROBLEM — G62.1 ALCOHOLIC POLYNEUROPATHY: Status: ACTIVE | Noted: 2019-10-15

## 2019-10-15 PROBLEM — K57.90 DIVERTICULOSIS: Status: ACTIVE | Noted: 2019-10-15

## 2019-10-15 PROBLEM — D12.6 TUBULAR ADENOMA OF COLON: Status: ACTIVE | Noted: 2019-10-15

## 2019-10-15 PROBLEM — R93.89 ABNORMAL FINDINGS ON IMAGING TEST: Status: ACTIVE | Noted: 2019-10-15

## 2019-10-15 PROBLEM — J44.9 COPD (CHRONIC OBSTRUCTIVE PULMONARY DISEASE) (HCC): Status: ACTIVE | Noted: 2019-10-15

## 2019-10-15 PROBLEM — D69.6 THROMBOCYTOPENIA (HCC): Status: ACTIVE | Noted: 2019-10-15

## 2019-10-15 RX ORDER — TADALAFIL 20 MG/1
TABLET ORAL
COMMUNITY
Start: 2013-10-28 | End: 2019-11-06

## 2019-10-15 RX ORDER — VARENICLINE TARTRATE 1 MG/1
TABLET, FILM COATED ORAL
COMMUNITY
Start: 2013-08-06 | End: 2019-11-06

## 2019-10-15 RX ORDER — LISINOPRIL 40 MG/1
TABLET ORAL DAILY
COMMUNITY
Start: 2013-12-18 | End: 2019-11-18 | Stop reason: SDUPTHER

## 2019-10-15 RX ORDER — SIMVASTATIN 40 MG
TABLET ORAL DAILY
COMMUNITY
Start: 2013-10-24 | End: 2019-10-21 | Stop reason: SDUPTHER

## 2019-10-21 ENCOUNTER — OFFICE VISIT (OUTPATIENT)
Dept: FAMILY MEDICINE CLINIC | Facility: CLINIC | Age: 74
End: 2019-10-21

## 2019-10-21 VITALS
BODY MASS INDEX: 28.95 KG/M2 | HEIGHT: 71 IN | WEIGHT: 206.8 LBS | TEMPERATURE: 97.4 F | HEART RATE: 65 BPM | OXYGEN SATURATION: 98 % | RESPIRATION RATE: 18 BRPM | SYSTOLIC BLOOD PRESSURE: 130 MMHG | DIASTOLIC BLOOD PRESSURE: 73 MMHG

## 2019-10-21 DIAGNOSIS — Z23 NEED FOR IMMUNIZATION AGAINST INFLUENZA: ICD-10-CM

## 2019-10-21 DIAGNOSIS — R13.11 ORAL PHASE DYSPHAGIA: Primary | ICD-10-CM

## 2019-10-21 PROCEDURE — 90653 IIV ADJUVANT VACCINE IM: CPT | Performed by: FAMILY MEDICINE

## 2019-10-21 PROCEDURE — 99213 OFFICE O/P EST LOW 20 MIN: CPT | Performed by: FAMILY MEDICINE

## 2019-10-21 PROCEDURE — G0008 ADMIN INFLUENZA VIRUS VAC: HCPCS | Performed by: FAMILY MEDICINE

## 2019-10-21 NOTE — PROGRESS NOTES
Patient presents with several week history of difficulty swallowing not associated with any other known symptoms.  He denies any vomiting, weight loss, fever, sore throat, or postnasal drainage.      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[facility])    • Allergic rhinitis 05/01/2013   • Anxiety 05/23/2016   • Arthritis 12/06/2016   • Bitten by dog, initial encounter 10/16/2018   • Body mass index 30.0-30.9, adult 12/22/2017   • Body mass index 31.0-31.9, adult 01/16/2018   • Brain mass    • Callus of foot 12/22/2016   • Cellulitis and abscess of leg 12/12/2017   • COPD (chronic obstructive pulmonary disease) (CMS/[facility])    • Cough 12/06/2016   • Degenerative joint disease of knee, right 03/26/2018   • Depressive disorder    • Derangement of medial meniscus of right knee 05/31/2017   • Diarrhea 04/25/2018   • Dysphagia 11/01/2017   • 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   • Encounter for preventative adult health care examination 12/18/2014   • Facial skin lesion 05/10/2018   • Foot pain, left 12/22/2016   • Foot pain, right 12/22/2016   • Gastroenteritis 04/25/2018   • GERD (gastroesophageal reflux disease)    • History of knee replacement procedure of right knee 01/24/2019   • History of skin cancer    • History of squamous cell carcinoma of skin    • Hyperlipidemia 05/01/2013   • Hypertension 05/01/2013   • Hypothyroidism    • Hypothyroidism 05/01/2013   • Impotence of organic origin    • Insomnia    • Internal derangement of knee 05/05/2014   • Knee pain, right 05/04/2017   • Knee pain, right 04/03/2017   • Low back pain    • Medication monitoring encounter 04/19/2019   • Meningioma (CMS/[facility]) 06/09/2014   • Nasal polyp 10/06/2017   • Neoplasm of uncertain behavior 10/11/2018   • Neoplasm of uncertain behavior of skin 05/04/2015   • Neuropathy 12/18/2014   • No known drug allergy    • Obesity    • Orthopedic aftercare  06/12/2017   • Overweight 12/12/2017   • Pain in joint of right ankle 05/31/2017   • Pain management    • Palpitations 12/22/2017   • Pre-op exam 12/18/2018   • Prostatic hypertrophy 06/09/2014   • Puncture wound without foreign body of left hand, initial encounter 10/16/2018   • Rash 06/28/2017   • Rotator cuff tear 05/19/2014   • Shortness of breath 02/02/2018   • Sinus tachycardia 03/16/2018   • Tobacco use      Past Surgical History:   Procedure Laterality Date   • ANKLE HARDWARE REMOVAL Right    • APPENDECTOMY     • KNEE ARTHROSCOPY  06/09/2017    and 12/5/2014. Dr Gallego. With partial medial and lateral miniscectomy.    • KNEE ARTHROSCOPY Right 06/09/2017   • KNEE ARTHROSCOPY W/ MENISCECTOMY Right 09/18/2018    right knee scope/meniscectomy   • LUMBAR DISC SURGERY  11/11/2015    Right L3-4 diskectomy. Dr. Pollard.    • ROTATOR CUFF REPAIR  07/2014    Dr Gallego.   • TONSILLECTOMY     • TOTAL KNEE ARTHROPLASTY Right 01/16/2019    Right TKR   • TOTAL KNEE ARTHROPLASTY Right 01/16/2019    Dr. Crain     Family History   Problem Relation Age of Onset   • Diabetes Mother    • Stroke Mother    • Other Mother         Thyroid Disease   • Heart disease Mother         Ischemic   • Hypertension Father    • Heart disease Father         Ischemic   • Hyperlipidemia Father      Social History     Tobacco Use   • Smoking status: Former Smoker   • Smokeless tobacco: Never Used   Substance Use Topics   • Alcohol use: Yes     Review of Systems   Constitutional: Negative for chills and fatigue.   Respiratory: Negative for cough and shortness of breath.    Cardiovascular: Negative for chest pain and palpitations.   Gastrointestinal: Positive for GERD and indigestion. Negative for abdominal pain, nausea and vomiting.   Musculoskeletal: Negative for arthralgias, back pain and myalgias.   Skin: Negative for rash.   Neurological: Negative for dizziness and headache.     Vitals:    10/21/19 1423   BP: 130/73   BP Location: Right arm   Cuff  "Size: Adult   Pulse: 65   Resp: 18   Temp: 97.4 °F (36.3 °C)   TempSrc: Oral   SpO2: 98%   Weight: 93.8 kg (206 lb 12.8 oz)   Height: 180.3 cm (71\")     Body mass index is 28.84 kg/m².  Physical Exam   Constitutional: He is oriented to person, place, and time. He appears well-developed and well-nourished. No distress.   Cardiovascular: Normal rate, regular rhythm and normal heart sounds.   No murmur heard.  Pulmonary/Chest: Effort normal and breath sounds normal. He has no wheezes. He has no rales.   Abdominal: Soft. Bowel sounds are normal. He exhibits no distension.   Musculoskeletal: Normal range of motion.   Neurological: He is alert and oriented to person, place, and time.   Skin: Skin is warm and dry.   Psychiatric: He has a normal mood and affect. His behavior is normal.     No visits with results within 7 Day(s) from this visit.   Latest known visit with results is:   Orders Only on 08/30/2019   Component Date Value Ref Range Status   • Testosterone, Total 08/30/2019 250.5* 264.0 - 916.0 ng/dL Final    Comment: This [facility] LC/MS-MS method is currently certified by the CDC  Hormone Standardization Program (HoSt). Adult male reference  interval is based on a population of healthy nonobese males  (BMI <30) between 19 and 39 years old. Karla et.al. JCEM  2017,102;6284-5946. PMID: 03195685.  This test was developed and its performance characteristics  determined by [facility]. It has not been cleared or approved  by the Food and Drug Administration.     • Testosterone, Free 08/30/2019 22.9* 6.6 - 18.1 pg/mL Final   • PSA 08/30/2019 0.7  0.0 - 4.0 ng/mL Final    Comment: [employer] ECLIA methodology.  According to the American Urological Association, Serum PSA should  decrease and remain at undetectable levels after radical  prostatectomy. The AUA defines biochemical recurrence as an initial  PSA value 0.2 ng/mL or greater followed by a subsequent confirmatory  PSA value 0.2 ng/mL or greater.  Values obtained with " different assay methods or kits cannot be used  interchangeably. Results cannot be interpreted as absolute evidence  of the presence or absence of malignant disease.     • Total Cholesterol 08/30/2019 163  100 - 199 mg/dL Final   • Triglycerides 08/30/2019 206* 0 - 149 mg/dL Final   • HDL Cholesterol 08/30/2019 35* >39 mg/dL Final   • VLDL Cholesterol 08/30/2019 41* 5 - 40 mg/dL Final   • LDL Cholesterol  08/30/2019 87  0 - 99 mg/dL Final         Diagnoses and all orders for this visit:    1. Oral phase dysphagia (Primary)  Assessment & Plan:  Exam is unremarkable.  No lymphadenopathy noted.  No thyromegaly noted.  Patient has had previous thyroidectomy.  Or for annual exam is normal.  I think this is most likely related to either cervical disc disease or reflux disease.  Discussed options and he will continue current OTC medications.  Consider swallowing study if symptoms persist per      2. Need for immunization against influenza  -     Fluad Tri 65yr+; Standing  -     Fluad Tri 65yr+

## 2019-10-21 NOTE — ASSESSMENT & PLAN NOTE
Exam is unremarkable.  No lymphadenopathy noted.  No thyromegaly noted.  Patient has had previous thyroidectomy.  Or for annual exam is normal.  I think this is most likely related to either cervical disc disease or reflux disease.  Discussed options and he will continue current OTC medications.  Consider swallowing study if symptoms persist per

## 2019-10-28 ENCOUNTER — OFFICE VISIT (OUTPATIENT)
Dept: FAMILY MEDICINE CLINIC | Facility: CLINIC | Age: 74
End: 2019-10-28

## 2019-10-28 VITALS
SYSTOLIC BLOOD PRESSURE: 161 MMHG | TEMPERATURE: 98.1 F | HEART RATE: 70 BPM | BODY MASS INDEX: 28.5 KG/M2 | DIASTOLIC BLOOD PRESSURE: 90 MMHG | WEIGHT: 203.6 LBS | RESPIRATION RATE: 18 BRPM | OXYGEN SATURATION: 97 % | HEIGHT: 71 IN

## 2019-10-28 DIAGNOSIS — I10 HYPERTENSION, BENIGN: ICD-10-CM

## 2019-10-28 DIAGNOSIS — R00.0 SINUS TACHYCARDIA: ICD-10-CM

## 2019-10-28 DIAGNOSIS — D12.6 TUBULAR ADENOMA OF COLON: ICD-10-CM

## 2019-10-28 DIAGNOSIS — R53.83 OTHER FATIGUE: Primary | ICD-10-CM

## 2019-10-28 DIAGNOSIS — E03.9 HYPOTHYROIDISM, UNSPECIFIED TYPE: ICD-10-CM

## 2019-10-28 DIAGNOSIS — E16.2 HYPOGLYCEMIA: ICD-10-CM

## 2019-10-28 DIAGNOSIS — R07.9 CHEST PAIN, UNSPECIFIED TYPE: ICD-10-CM

## 2019-10-28 PROCEDURE — 93000 ELECTROCARDIOGRAM COMPLETE: CPT | Performed by: NURSE PRACTITIONER

## 2019-10-28 PROCEDURE — 99214 OFFICE O/P EST MOD 30 MIN: CPT | Performed by: NURSE PRACTITIONER

## 2019-10-28 NOTE — PROGRESS NOTES
Chief Complaint   Patient presents with   • Fatigue        Subjective   Shai Chery is a 73 y.o. male who presents today for fatigue    HPI:  Pt works out 5 days a week.  He has been having fatigue on the days he does not work out.  He has occ vague chest wall pain.  He does a lot of wt lifting and is using a pre workout drink.   He is not sob.  He had a stress test and echo in early 2018.  He has been on metoprolol for tachycardia.  He has a upcoming fu with cardiology.  He is due for a colonoscopy in jan 2020.  No other pain other muscular.  He reports he pushes himself a lot during his work outs.       Shai Chery  has a past medical history of Abnormal laboratory test (03/26/2018), Alcoholic polyneuropathy (CMS/[facility]), Allergic rhinitis (05/01/2013), Anxiety (05/23/2016), Arthritis (12/06/2016), Bitten by dog, initial encounter (10/16/2018), Body mass index 30.0-30.9, adult (12/22/2017), Body mass index 31.0-31.9, adult (01/16/2018), Brain mass, Callus of foot (12/22/2016), Cellulitis and abscess of leg (12/12/2017), COPD (chronic obstructive pulmonary disease) (CMS/[facility]), Cough (12/06/2016), Degenerative joint disease of knee, right (03/26/2018), Depressive disorder, Derangement of medial meniscus of right knee (05/31/2017), Diarrhea (04/25/2018), Dysphagia (11/01/2017), 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), Encounter for preventative adult health care examination (12/18/2014), Facial skin lesion (05/10/2018), Foot pain, left (12/22/2016), Foot pain, right (12/22/2016), Gastroenteritis (04/25/2018), GERD (gastroesophageal reflux disease), History of knee replacement procedure of right knee (01/24/2019), History of skin cancer, History of squamous cell carcinoma of skin, Hyperlipidemia (05/01/2013), Hypertension (05/01/2013), Hypothyroidism, Hypothyroidism (05/01/2013), Impotence of organic origin, Insomnia, Internal derangement of knee (05/05/2014), Knee pain, right (05/04/2017),  Knee pain, right (04/03/2017), Low back pain, Medication monitoring encounter (04/19/2019), Meningioma (CMS/HCC) (06/09/2014), Nasal polyp (10/06/2017), Neoplasm of uncertain behavior (10/11/2018), Neoplasm of uncertain behavior of skin (05/04/2015), Neuropathy (12/18/2014), No known drug allergy, Obesity, Orthopedic aftercare (06/12/2017), Overweight (12/12/2017), Pain in joint of right ankle (05/31/2017), Pain management, Palpitations (12/22/2017), Pre-op exam (12/18/2018), Prostatic hypertrophy (06/09/2014), Puncture wound without foreign body of left hand, initial encounter (10/16/2018), Rash (06/28/2017), Rotator cuff tear (05/19/2014), Shortness of breath (02/02/2018), Sinus tachycardia (03/16/2018), and Tobacco use.    No Known Allergies    Current Outpatient Medications:   •  atorvastatin (LIPITOR) 20 MG tablet, Take 20 mg by mouth Daily., Disp: , Rfl:   •  baclofen (LIORESAL) 10 MG tablet, TAKE 1 TABLET BY MOUTH THREE TIMES DAILY, Disp: 90 tablet, Rfl: 0  •  dutasteride (AVODART) 0.5 MG capsule, 1 capsule Daily., Disp: , Rfl:   •  gabapentin (NEURONTIN) 300 MG capsule, TAKE 1 CAPSULE IN THE MORNING  AND AT NOON,  AND TAKE 3 CAPSULES AT BEDTIME, Disp: 450 capsule, Rfl: 3  •  Garlic 1000 MG capsule, Take 1 capsule by mouth Daily., Disp: , Rfl:   •  levothyroxine (SYNTHROID, LEVOTHROID) 200 MCG tablet, Daily., Disp: , Rfl:   •  lisinopril (PRINIVIL,ZESTRIL) 40 MG tablet, Take  by mouth Daily., Disp: , Rfl:   •  metoprolol tartrate (LOPRESSOR) 50 MG tablet, Every 12 (Twelve) Hours., Disp: , Rfl:   •  Omega-3 Fatty Acids ([employer] FISH OIL) 1200 MG capsule, Take 2 capsules by mouth Daily., Disp: , Rfl:   •  predniSONE (DELTASONE) 10 MG tablet, Take 4 po qam x 4d, then 3 po qam x 4d, then 2 po qam x 4 d, then 1 po qam x 4 d, Disp: 40 tablet, Rfl: 0  •  tadalafil (CIALIS) 20 MG tablet, Take  by mouth., Disp: , Rfl:   •  tamsulosin (FLOMAX) 0.4 MG capsule 24 hr capsule, Take 0.4 mg by mouth Daily., Disp: , Rfl:   •   traMADol (ULTRAM) 50 MG tablet, Take 1 tablet by mouth every night at bedtime., Disp: 40 tablet, Rfl: 0  •  varenicline (CHANTIX) 1 MG tablet, Take  by mouth., Disp: , Rfl:   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[facility])    • Allergic rhinitis 05/01/2013   • Anxiety 05/23/2016   • Arthritis 12/06/2016   • Bitten by dog, initial encounter 10/16/2018   • Body mass index 30.0-30.9, adult 12/22/2017   • Body mass index 31.0-31.9, adult 01/16/2018   • Brain mass    • Callus of foot 12/22/2016    Calluses, feet, bilateral   • Cellulitis and abscess of leg 12/12/2017   • COPD (chronic obstructive pulmonary disease) (CMS/[facility])    • Cough 12/06/2016   • Degenerative joint disease of knee, right 03/26/2018    primary localized degenerative joint disease of right knee.    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 However diet modification and compliance with medication is discussed with the patient. Patient is advised to have periodic AST, ALT, and lipid panel testing through your office.    • Hypertension 05/01/2013    Benign. 12-14-18- patient's blood pressure is within desireable ranges. At this time, I would not recommend any change in antihypertensive regimen. However, patient is advised  to check the blood pressure periodically at home and bring the logbook on next visit. Patient is also encouraged to increase aerobic activities as tolerated. Risk factor modification is discussed.    • Hypothyroidism    • Hypothyroidism 05/01/2013   • Impotence of organic origin    • Insomnia    • Internal derangement of knee 05/05/2014 6-9-14- He saw Dr. Sams and Dr. Concepcion. He had fluid drained from his knee with negative cultures and negative crystals. Both were not concerned about the MRI (report not avilable), but he has a radiologist friend who read it as having meniscal damage. He is offered referral back to the orthopedist of his choice and will thnk about who he wants to see. He has upcoming shoulder surgery.    • Knee pain, right 05/04/2017   • Knee pain, right 04/03/2017   • Low back pain    • Medication monitoring encounter 04/19/2019   • Meningioma (CMS/HCC) 06/09/2014   • Nasal polyp 10/06/2017   • Neoplasm of uncertain behavior 10/11/2018   • Neoplasm of uncertain behavior of skin 05/04/2015   • Neuropathy 12/18/2014   • No known drug allergy    • Obesity    • Orthopedic aftercare 06/12/2017   • Overweight 12/12/2017   • Pain in joint of right ankle 05/31/2017   • Pain management     [facility]    • Palpitations 12/22/2017    3-16-18- his symptom of palpitation have improved. Continue beta-blockers.    • Pre-op exam 12/18/2018   • Prostatic hypertrophy 06/09/2014    Benign   • Puncture wound without foreign body of left hand, initial encounter 10/16/2018   • Rash 06/28/2017   • Rotator cuff tear 05/19/2014    left   • Shortness of breath 02/02/2018   • Sinus tachycardia 03/16/2018 12-14-18- much better. Continue current treat.    • Tobacco use      Past Surgical History:   Procedure Laterality Date   • ANKLE HARDWARE REMOVAL Right    • APPENDECTOMY     • KNEE ARTHROSCOPY  06/09/2017    and 12/5/2014. Dr Gallego. With partial medial and lateral miniscectomy.    • KNEE ARTHROSCOPY  Right 06/09/2017   • KNEE ARTHROSCOPY W/ MENISCECTOMY Right 09/18/2018    right knee scope/meniscectomy   • LUMBAR DISC SURGERY  11/11/2015    Right L3-4 diskectomy. Dr. Pollard.    • ROTATOR CUFF REPAIR  07/2014    Dr Gallego.   • TONSILLECTOMY     • TOTAL KNEE ARTHROPLASTY Right 01/16/2019    Right TKR   • TOTAL KNEE ARTHROPLASTY Right 01/16/2019    Dr. Crain     Social History     Socioeconomic History   • Marital status:      Spouse name: Not on file   • Number of children: Not on file   • Years of education: Not on file   • Highest education level: Not on file   Tobacco Use   • Smoking status: Former Smoker   • Smokeless tobacco: Never Used   Substance and Sexual Activity   • Alcohol use: Yes   • Drug use: No     Family History   Problem Relation Age of Onset   • Diabetes Mother    • Stroke Mother    • Other Mother         Thyroid Disease   • Heart disease Mother         Ischemic   • Hypertension Father    • Heart disease Father         Ischemic   • Hyperlipidemia Father        Family history, surgical history, past medical history, Allergies and med's reviewed with patient today and updated in EP[employer] EMR.   PHQ-2 Depression Screening  Little interest or pleasure in doing things?     Feeling down, depressed, or hopeless?     PHQ-2 Total Score     PHQ-9 Depression Screening  Little interest or pleasure in doing things?     Feeling down, depressed, or hopeless?     Trouble falling or staying asleep, or sleeping too much?     Feeling tired or having little energy?     Poor appetite or overeating?     Feeling bad about yourself - or that you are a failure or have let yourself or your family down?     Trouble concentrating on things, such as reading the newspaper or watching television?     Moving or speaking so slowly that other people could have noticed? Or the opposite - being so fidgety or restless that you have been moving around a lot more than usual?     Thoughts that you would be better off dead, or of  "hurting yourself in some way?     PHQ-9 Total Score     If you checked off any problems, how difficult have these problems made it for you to do your work, take care of things at home, or get along with other people?       ROS:  Review of Systems   Constitutional: Positive for fatigue. Negative for fever.   Respiratory: Negative for cough and chest tightness.    Cardiovascular: Positive for palpitations. Negative for chest pain and leg swelling.        Controlled with metoprolol   Gastrointestinal: Negative for abdominal pain, diarrhea and nausea.   Genitourinary: Negative for dysuria.   Musculoskeletal: Positive for arthralgias.       OBJECTIVE:  Vitals:    10/28/19 1522   BP: 161/90   BP Location: Right arm   Cuff Size: Adult   Pulse: 70   Resp: 18   Temp: 98.1 °F (36.7 °C)   TempSrc: Oral   SpO2: 97%   Weight: 92.4 kg (203 lb 9.6 oz)   Height: 180.3 cm (71\")     Physical Exam    ASSESSMENT/ PLAN:    Shai was seen today for fatigue.    Diagnoses and all orders for this visit:    Other fatigue  Comments:  if labs are normal refer to cardiology  hold report out supplement.    Orders:  -     Comprehensive Metabolic Panel  -     TSH  -     CBC & Differential    Chest pain, unspecified type  -     ECG 12 Lead    Hypertension, benign    Sinus tachycardia    Tubular adenoma of colon    Hypothyroidism, unspecified type    Hypoglycemia        Orders Placed Today:     No orders of the defined types were placed in this encounter.       Management Plan:     An After Visit Summary was printed and given to the patient at discharge.    Follow-up: Return if symptoms worsen or fail to improve.    JUAN Curry 10/28/2019 4:09 PM  This note was electronically signed.    "

## 2019-10-29 ENCOUNTER — TELEPHONE (OUTPATIENT)
Dept: FAMILY MEDICINE CLINIC | Facility: CLINIC | Age: 74
End: 2019-10-29

## 2019-10-29 DIAGNOSIS — R07.9 CHEST PAIN, UNSPECIFIED TYPE: Primary | ICD-10-CM

## 2019-10-29 DIAGNOSIS — R53.83 OTHER FATIGUE: ICD-10-CM

## 2019-10-29 LAB
ALBUMIN SERPL-MCNC: 4.7 G/DL (ref 3.5–4.8)
ALBUMIN/GLOB SERPL: 1.7 {RATIO} (ref 1.2–2.2)
ALP SERPL-CCNC: 64 IU/L (ref 39–117)
ALT SERPL-CCNC: 39 IU/L (ref 0–44)
AST SERPL-CCNC: 36 IU/L (ref 0–40)
BASOPHILS # BLD AUTO: 0.1 X10E3/UL (ref 0–0.2)
BASOPHILS NFR BLD AUTO: 1 %
BILIRUB SERPL-MCNC: 0.5 MG/DL (ref 0–1.2)
BUN SERPL-MCNC: 21 MG/DL (ref 8–27)
BUN/CREAT SERPL: 18 (ref 10–24)
CALCIUM SERPL-MCNC: 9.6 MG/DL (ref 8.6–10.2)
CHLORIDE SERPL-SCNC: 102 MMOL/L (ref 96–106)
CO2 SERPL-SCNC: 24 MMOL/L (ref 20–29)
CREAT SERPL-MCNC: 1.15 MG/DL (ref 0.76–1.27)
EOSINOPHIL # BLD AUTO: 0.1 X10E3/UL (ref 0–0.4)
EOSINOPHIL NFR BLD AUTO: 2 %
ERYTHROCYTE [DISTWIDTH] IN BLOOD BY AUTOMATED COUNT: 14.4 % (ref 12.3–15.4)
GLOBULIN SER CALC-MCNC: 2.7 G/DL (ref 1.5–4.5)
GLUCOSE SERPL-MCNC: 99 MG/DL (ref 65–99)
HCT VFR BLD AUTO: 45.8 % (ref 37.5–51)
HGB BLD-MCNC: 15.7 G/DL (ref 13–17.7)
IMM GRANULOCYTES # BLD AUTO: 0 X10E3/UL (ref 0–0.1)
IMM GRANULOCYTES NFR BLD AUTO: 0 %
LYMPHOCYTES # BLD AUTO: 2 X10E3/UL (ref 0.7–3.1)
LYMPHOCYTES NFR BLD AUTO: 28 %
MCH RBC QN AUTO: 30.8 PG (ref 26.6–33)
MCHC RBC AUTO-ENTMCNC: 34.3 G/DL (ref 31.5–35.7)
MCV RBC AUTO: 90 FL (ref 79–97)
MONOCYTES # BLD AUTO: 0.7 X10E3/UL (ref 0.1–0.9)
MONOCYTES NFR BLD AUTO: 9 %
NEUTROPHILS # BLD AUTO: 4.5 X10E3/UL (ref 1.4–7)
NEUTROPHILS NFR BLD AUTO: 60 %
PLATELET # BLD AUTO: 129 X10E3/UL (ref 150–450)
POTASSIUM SERPL-SCNC: 4.1 MMOL/L (ref 3.5–5.2)
PROT SERPL-MCNC: 7.4 G/DL (ref 6–8.5)
RBC # BLD AUTO: 5.1 X10E6/UL (ref 4.14–5.8)
SODIUM SERPL-SCNC: 142 MMOL/L (ref 134–144)
TSH SERPL DL<=0.005 MIU/L-ACNC: 0.52 UIU/ML (ref 0.45–4.5)
WBC # BLD AUTO: 7.4 X10E3/UL (ref 3.4–10.8)

## 2019-10-29 NOTE — TELEPHONE ENCOUNTER
Pt has a cardiology appt with Dr Collazo in Dec.  He needs to have that moved up due to fatigue and chest pain.  I have also ordered a echocardiogram that needs to be done prior if possible.      He needs a repeat cbc in one month.

## 2019-10-29 NOTE — TELEPHONE ENCOUNTER
Message sent to Dr. Collazo's office to move appt.   Precert will be started on Echo.   Reminder placed for repeat CBC in 1 month.

## 2019-10-31 ENCOUNTER — HOSPITAL ENCOUNTER (OUTPATIENT)
Dept: CARDIOLOGY | Facility: HOSPITAL | Age: 74
Discharge: HOME | End: 2019-10-31
Admitting: NURSE PRACTITIONER

## 2019-10-31 VITALS — BODY MASS INDEX: 28.52 KG/M2 | HEIGHT: 71 IN | WEIGHT: 203.71 LBS

## 2019-10-31 DIAGNOSIS — R07.9 CHEST PAIN, UNSPECIFIED TYPE: ICD-10-CM

## 2019-10-31 DIAGNOSIS — R53.83 OTHER FATIGUE: ICD-10-CM

## 2019-10-31 PROCEDURE — 93306 TTE W/DOPPLER COMPLETE: CPT

## 2019-11-01 LAB
BH CV ECHO MEAS - ACS: 2.2 CM
BH CV ECHO MEAS - AI DEC SLOPE: 174.6 CM/SEC^2
BH CV ECHO MEAS - AI DEC TIME: 2.2 SEC
BH CV ECHO MEAS - AI MAX PG: 51.9 MMHG
BH CV ECHO MEAS - AI MAX VEL: 359.9 CM/SEC
BH CV ECHO MEAS - AI P1/2T: 603.7 MSEC
BH CV ECHO MEAS - AO MAX PG (FULL): 1.6 MMHG
BH CV ECHO MEAS - AO MAX PG: 4.8 MMHG
BH CV ECHO MEAS - AO MEAN PG (FULL): 0.94 MMHG
BH CV ECHO MEAS - AO MEAN PG: 2.5 MMHG
BH CV ECHO MEAS - AO ROOT AREA (BSA CORRECTED): 1.5
BH CV ECHO MEAS - AO ROOT AREA: 7.8 CM^2
BH CV ECHO MEAS - AO ROOT DIAM: 3.2 CM
BH CV ECHO MEAS - AO V2 MAX: 110.1 CM/SEC
BH CV ECHO MEAS - AO V2 MEAN: 73.8 CM/SEC
BH CV ECHO MEAS - AO V2 VTI: 27.2 CM
BH CV ECHO MEAS - AVA(I,A): 2.7 CM^2
BH CV ECHO MEAS - AVA(I,D): 2.7 CM^2
BH CV ECHO MEAS - AVA(V,A): 2.7 CM^2
BH CV ECHO MEAS - AVA(V,D): 2.7 CM^2
BH CV ECHO MEAS - BSA(HAYCOCK): 2.2 M^2
BH CV ECHO MEAS - BSA: 2.1 M^2
BH CV ECHO MEAS - BZI_BMI: 28.3 KILOGRAMS/M^2
BH CV ECHO MEAS - BZI_METRIC_HEIGHT: 180.3 CM
BH CV ECHO MEAS - BZI_METRIC_WEIGHT: 92.1 KG
BH CV ECHO MEAS - EDV(CUBED): 183.8 ML
BH CV ECHO MEAS - EDV(MOD-SP4): 93.8 ML
BH CV ECHO MEAS - EDV(TEICH): 159.1 ML
BH CV ECHO MEAS - EF(CUBED): 66 %
BH CV ECHO MEAS - EF(MOD-BP): 57 %
BH CV ECHO MEAS - EF(MOD-SP4): 56.2 %
BH CV ECHO MEAS - EF(TEICH): 56.9 %
BH CV ECHO MEAS - ESV(CUBED): 62.4 ML
BH CV ECHO MEAS - ESV(MOD-SP4): 41 ML
BH CV ECHO MEAS - ESV(TEICH): 68.6 ML
BH CV ECHO MEAS - FS: 30.2 %
BH CV ECHO MEAS - IVS/LVPW: 0.97
BH CV ECHO MEAS - IVSD: 1 CM
BH CV ECHO MEAS - LA DIMENSION: 4.1 CM
BH CV ECHO MEAS - LA/AO: 1.3
BH CV ECHO MEAS - LV DIASTOLIC VOL/BSA (35-75): 44.2 ML/M^2
BH CV ECHO MEAS - LV MASS(C)D: 228.2 GRAMS
BH CV ECHO MEAS - LV MASS(C)DI: 107.5 GRAMS/M^2
BH CV ECHO MEAS - LV MAX PG: 3.2 MMHG
BH CV ECHO MEAS - LV MEAN PG: 1.5 MMHG
BH CV ECHO MEAS - LV SYSTOLIC VOL/BSA (12-30): 19.3 ML/M^2
BH CV ECHO MEAS - LV V1 MAX: 89.6 CM/SEC
BH CV ECHO MEAS - LV V1 MEAN: 58.1 CM/SEC
BH CV ECHO MEAS - LV V1 VTI: 22.2 CM
BH CV ECHO MEAS - LVIDD: 5.7 CM
BH CV ECHO MEAS - LVIDS: 4 CM
BH CV ECHO MEAS - LVOT AREA: 3.4 CM^2
BH CV ECHO MEAS - LVOT DIAM: 2.1 CM
BH CV ECHO MEAS - LVPWD: 1 CM
BH CV ECHO MEAS - MR MAX PG: 123.1 MMHG
BH CV ECHO MEAS - MR MAX VEL: 554.4 CM/SEC
BH CV ECHO MEAS - MV A MAX VEL: 66.6 CM/SEC
BH CV ECHO MEAS - MV E MAX VEL: 75.5 CM/SEC
BH CV ECHO MEAS - MV E/A: 1.1
BH CV ECHO MEAS - MV MAX PG: 2.5 MMHG
BH CV ECHO MEAS - MV MEAN PG: 1 MMHG
BH CV ECHO MEAS - MV V2 MAX: 79.7 CM/SEC
BH CV ECHO MEAS - MV V2 MEAN: 47.9 CM/SEC
BH CV ECHO MEAS - MV V2 VTI: 26 CM
BH CV ECHO MEAS - MVA(VTI): 2.9 CM^2
BH CV ECHO MEAS - PA ACC TIME: 0.14 SEC
BH CV ECHO MEAS - PA MAX PG: 2.4 MMHG
BH CV ECHO MEAS - PA PR(ACCEL): 16.1 MMHG
BH CV ECHO MEAS - PA V2 MAX: 77 CM/SEC
BH CV ECHO MEAS - PI END-D VEL: 97 CM/SEC
BH CV ECHO MEAS - RAP SYSTOLE: 10 MMHG
BH CV ECHO MEAS - RVDD(2D): 2.3 CM
BH CV ECHO MEAS - RVDD: 2.3 CM
BH CV ECHO MEAS - RVSP: 40.5 MMHG
BH CV ECHO MEAS - SI(AO): 100.1 ML/M^2
BH CV ECHO MEAS - SI(CUBED): 57.2 ML/M^2
BH CV ECHO MEAS - SI(LVOT): 35.2 ML/M^2
BH CV ECHO MEAS - SI(MOD-SP4): 24.9 ML/M^2
BH CV ECHO MEAS - SI(TEICH): 42.6 ML/M^2
BH CV ECHO MEAS - SV(AO): 212.4 ML
BH CV ECHO MEAS - SV(CUBED): 121.4 ML
BH CV ECHO MEAS - SV(LVOT): 74.6 ML
BH CV ECHO MEAS - SV(MOD-SP4): 52.8 ML
BH CV ECHO MEAS - SV(TEICH): 90.5 ML
BH CV ECHO MEAS - TR MAX VEL: 258.4 CM/SEC
MAXIMAL PREDICTED HEART RATE: 148 BPM
STRESS TARGET HR: 126 BPM

## 2019-11-01 PROCEDURE — 93306 TTE W/DOPPLER COMPLETE: CPT | Performed by: INTERNAL MEDICINE

## 2019-11-06 ENCOUNTER — OFFICE VISIT (OUTPATIENT)
Dept: CARDIOLOGY | Facility: CLINIC | Age: 74
End: 2019-11-06

## 2019-11-06 VITALS
HEIGHT: 70 IN | WEIGHT: 206 LBS | BODY MASS INDEX: 29.49 KG/M2 | HEART RATE: 68 BPM | DIASTOLIC BLOOD PRESSURE: 72 MMHG | SYSTOLIC BLOOD PRESSURE: 132 MMHG

## 2019-11-06 DIAGNOSIS — I10 ESSENTIAL HYPERTENSION: ICD-10-CM

## 2019-11-06 DIAGNOSIS — R06.02 SOB (SHORTNESS OF BREATH): ICD-10-CM

## 2019-11-06 DIAGNOSIS — E78.2 MIXED HYPERLIPIDEMIA: Primary | ICD-10-CM

## 2019-11-06 DIAGNOSIS — R07.9 CHEST PAIN, UNSPECIFIED TYPE: ICD-10-CM

## 2019-11-06 DIAGNOSIS — R00.0 SINUS TACHYCARDIA: ICD-10-CM

## 2019-11-06 PROCEDURE — 99214 OFFICE O/P EST MOD 30 MIN: CPT | Performed by: INTERNAL MEDICINE

## 2019-11-06 NOTE — PROGRESS NOTES
Date of Office Visit: 2019  Encounter Provider: Dr. Fredy Collazo  Place of Service: [facility] CARDIOLOGY [place]  Patient Name: Shai Chery  :1945  Lyell, Reggie Duane, MD    Chief Complaint   Patient presents with   • Rapid Heart Rate     11 month follow up /echo   • Hypertension   • Hyperlipidemia   • Shortness of Breath     History of Present Illness    I am pleased to see Mr. Chery in my office today as a follow-up    As you know, patient is 73-year-old white gentleman whose past medical history significant for hypertension, hyperlipidemia, history of tobacco abuse, who came today for follow-up    In 2018, he was presented to me for symptom of shortness of breath and palpitation.  He was found to have sinus tachycardia.  Patient underwent stress test which showed no ischemia.  Echocardiogram showed EF of 55-60%.  No significant valvular heart disease.  Patient was started on Lopressor 25 mg twice a day.    Patient came today for routine follow-up.  His main complaints of fatigue and tiredness.  He takes frequent naps through the day.  Patient reports that he wakes up every 2 hours for micturition.  He has enlarged prostate.  Patient denies any chest pain or tightness or heaviness.  No orthopnea PND patient has relative bradycardia but no significant bradycardia with heart rate less than 50.  Patient denies leg edema or palpitation.    EKG showed normal sinus rhythm.    At this stage, patient is stable from cardiac standpoint with relatively desirable blood pressure.  I do not have explanation for his fatigue and tiredness.  However I suspect this is probably due to lack of enough sleep.  Patient wakes up every 2 hours and he never go into deep sleep to recover.  I think it is related to insomnia and which is being triggered by his prostrate problem.  I strongly encouraged the patient to see his urologist.  Patient may need sleep aid to help his sleep.  No further cardiac work-up  needed.    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[facility])    • Allergic rhinitis 05/01/2013   • Anxiety 05/23/2016   • Arthritis 12/06/2016   • Bitten by dog, initial encounter 10/16/2018   • Body mass index 30.0-30.9, adult 12/22/2017   • Body mass index 31.0-31.9, adult 01/16/2018   • Brain mass    • Callus of foot 12/22/2016    Calluses, feet, bilateral   • Cellulitis and abscess of leg 12/12/2017   • COPD (chronic obstructive pulmonary disease) (CMS/[facility])    • Cough 12/06/2016   • Degenerative joint disease of knee, right 03/26/2018    primary localized degenerative joint disease of right knee.    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 However diet modification and compliance with medication is discussed with the patient. Patient is advised to have periodic AST, ALT, and lipid panel testing through your office.    • Hypertension 05/01/2013    Benign. 12-14-18- patient's blood pressure is within desireable ranges. At this time, I would not recommend any change in antihypertensive regimen. However, patient is advised to check the blood pressure periodically at home and bring the logbook on next visit. Patient is also encouraged to increase aerobic activities as tolerated. Risk  factor modification is discussed.    • Hypothyroidism    • Hypothyroidism 05/01/2013   • Impotence of organic origin    • Insomnia    • Internal derangement of knee 05/05/2014 6-9-14- He saw Dr. Sams and Dr. Concepcion. He had fluid drained from his knee with negative cultures and negative crystals. Both were not concerned about the MRI (report not avilable), but he has a radiologist friend who read it as having meniscal damage. He is offered referral back to the orthopedist of his choice and will thnk about who he wants to see. He has upcoming shoulder surgery.    • Knee pain, right 05/04/2017   • Knee pain, right 04/03/2017   • Low back pain    • Medication monitoring encounter 04/19/2019   • Meningioma (CMS/HCC) 06/09/2014   • Nasal polyp 10/06/2017   • Neoplasm of uncertain behavior 10/11/2018   • Neoplasm of uncertain behavior of skin 05/04/2015   • Neuropathy 12/18/2014   • No known drug allergy    • Obesity    • Orthopedic aftercare 06/12/2017   • Overweight 12/12/2017   • Pain in joint of right ankle 05/31/2017   • Pain management     [facility]    • Palpitations 12/22/2017    3-16-18- his symptom of palpitation have improved. Continue beta-blockers.    • Pre-op exam 12/18/2018   • Prostatic hypertrophy 06/09/2014    Benign   • Puncture wound without foreign body of left hand, initial encounter 10/16/2018   • Rash 06/28/2017   • Rotator cuff tear 05/19/2014    left   • Shortness of breath 02/02/2018   • Sinus tachycardia 03/16/2018 12-14-18- much better. Continue current treat.    • Tobacco use          Past Surgical History:   Procedure Laterality Date   • ANKLE HARDWARE REMOVAL Right    • APPENDECTOMY     • KNEE ARTHROSCOPY  06/09/2017    and 12/5/2014. Dr Gallego. With partial medial and lateral miniscectomy.    • KNEE ARTHROSCOPY Right 06/09/2017   • KNEE ARTHROSCOPY W/ MENISCECTOMY Right 09/18/2018    right knee scope/meniscectomy   • LUMBAR DISC SURGERY  11/11/2015    Right L3-4  diskectomy. Dr. Pollard.    • ROTATOR CUFF REPAIR  07/2014    Dr Gallego.   • TONSILLECTOMY     • TOTAL KNEE ARTHROPLASTY Right 01/16/2019    Right TKR   • TOTAL KNEE ARTHROPLASTY Right 01/16/2019    Dr. Crain           Current Outpatient Medications:   •  atorvastatin (LIPITOR) 20 MG tablet, Take 20 mg by mouth Daily., Disp: , Rfl:   •  dutasteride (AVODART) 0.5 MG capsule, 1 capsule Daily., Disp: , Rfl:   •  gabapentin (NEURONTIN) 300 MG capsule, TAKE 1 CAPSULE IN THE MORNING  AND AT NOON,  AND TAKE 3 CAPSULES AT BEDTIME, Disp: 450 capsule, Rfl: 3  •  Garlic 1000 MG capsule, Take 1 capsule by mouth Daily., Disp: , Rfl:   •  levothyroxine (SYNTHROID, LEVOTHROID) 200 MCG tablet, Daily., Disp: , Rfl:   •  lisinopril (PRINIVIL,ZESTRIL) 40 MG tablet, Take  by mouth Daily., Disp: , Rfl:   •  metoprolol tartrate (LOPRESSOR) 50 MG tablet, Every 12 (Twelve) Hours., Disp: , Rfl:   •  Omega-3 Fatty Acids ([employer] FISH OIL) 1200 MG capsule, Take 2 capsules by mouth Daily., Disp: , Rfl:   •  tamsulosin (FLOMAX) 0.4 MG capsule 24 hr capsule, Take 0.4 mg by mouth Daily., Disp: , Rfl:       Social History     Socioeconomic History   • Marital status:      Spouse name: Not on file   • Number of children: Not on file   • Years of education: Not on file   • Highest education level: Not on file   Tobacco Use   • Smoking status: Former Smoker   • Smokeless tobacco: Never Used   Substance and Sexual Activity   • Alcohol use: Yes   • Drug use: No   • Sexual activity: Defer         Review of Systems   Constitution: Positive for malaise/fatigue. Negative for chills and fever.   HENT: Negative for ear discharge and nosebleeds.    Eyes: Negative for discharge and redness.   Cardiovascular: Negative for chest pain, orthopnea, palpitations, paroxysmal nocturnal dyspnea and syncope.   Respiratory: Negative for cough, shortness of breath and wheezing.    Endocrine: Negative for heat intolerance.   Skin: Negative for rash.   Musculoskeletal:  "Negative for arthritis and myalgias.   Gastrointestinal: Negative for abdominal pain, melena, nausea and vomiting.   Genitourinary: Negative for dysuria and hematuria.   Neurological: Negative for dizziness, light-headedness, numbness and tremors.   Psychiatric/Behavioral: Negative for depression. The patient is not nervous/anxious.        Procedures    Procedures    No orders to display           Objective:    /72   Pulse 68   Ht 177.8 cm (70\")   Wt 93.4 kg (206 lb)   BMI 29.56 kg/m²         Physical Exam   Constitutional: He is oriented to person, place, and time. He appears well-developed and well-nourished.   HENT:   Head: Normocephalic and atraumatic.   Eyes: No scleral icterus.   Neck: No thyromegaly present.   Cardiovascular: Normal rate, regular rhythm and normal heart sounds. Exam reveals no gallop and no friction rub.   No murmur heard.  Pulmonary/Chest: Effort normal and breath sounds normal. No respiratory distress. He has no wheezes. He has no rales.   Abdominal: There is no tenderness.   Musculoskeletal: He exhibits no edema.   Lymphadenopathy:     He has no cervical adenopathy.   Neurological: He is alert and oriented to person, place, and time.   Skin: No rash noted. No erythema.   Psychiatric: He has a normal mood and affect.           Assessment:       Diagnosis Plan   1. Mixed hyperlipidemia     2. Sinus tachycardia     3. Essential hypertension     4. Chest pain, unspecified type     5. SOB (shortness of breath)              Plan:       At this stage I will recommend that patient should be monitored for his symptoms.  I strongly believe that he should take care of for his quality of sleep.  Patient may need sleep studies in future.  "

## 2019-11-12 ENCOUNTER — OFFICE VISIT (OUTPATIENT)
Dept: FAMILY MEDICINE CLINIC | Facility: CLINIC | Age: 74
End: 2019-11-12

## 2019-11-12 VITALS
RESPIRATION RATE: 16 BRPM | WEIGHT: 210 LBS | TEMPERATURE: 98.1 F | SYSTOLIC BLOOD PRESSURE: 133 MMHG | OXYGEN SATURATION: 98 % | HEIGHT: 70 IN | DIASTOLIC BLOOD PRESSURE: 83 MMHG | BODY MASS INDEX: 30.06 KG/M2 | HEART RATE: 71 BPM

## 2019-11-12 DIAGNOSIS — D69.6 THROMBOCYTOPENIA (HCC): ICD-10-CM

## 2019-11-12 DIAGNOSIS — N40.1 BENIGN PROSTATIC HYPERPLASIA WITH NOCTURIA: ICD-10-CM

## 2019-11-12 DIAGNOSIS — R35.1 BENIGN PROSTATIC HYPERPLASIA WITH NOCTURIA: ICD-10-CM

## 2019-11-12 DIAGNOSIS — G47.00 INSOMNIA, UNSPECIFIED TYPE: ICD-10-CM

## 2019-11-12 DIAGNOSIS — R35.1 NOCTURIA: Primary | ICD-10-CM

## 2019-11-12 DIAGNOSIS — J01.00 ACUTE NON-RECURRENT MAXILLARY SINUSITIS: ICD-10-CM

## 2019-11-12 LAB
BILIRUB BLD-MCNC: NEGATIVE MG/DL
CLARITY, POC: CLEAR
COLOR UR: YELLOW
GLUCOSE UR STRIP-MCNC: NEGATIVE MG/DL
KETONES UR QL: NEGATIVE
LEUKOCYTE EST, POC: NEGATIVE
NITRITE UR-MCNC: NEGATIVE MG/ML
PH UR: 6 [PH] (ref 5–8)
PROT UR STRIP-MCNC: NEGATIVE MG/DL
RBC # UR STRIP: ABNORMAL /UL
SP GR UR: 1.02 (ref 1–1.03)
UROBILINOGEN UR QL: NORMAL

## 2019-11-12 PROCEDURE — 99214 OFFICE O/P EST MOD 30 MIN: CPT | Performed by: NURSE PRACTITIONER

## 2019-11-12 PROCEDURE — 81003 URINALYSIS AUTO W/O SCOPE: CPT | Performed by: NURSE PRACTITIONER

## 2019-11-12 RX ORDER — DOXYCYCLINE 100 MG/1
100 CAPSULE ORAL 2 TIMES DAILY
Qty: 20 CAPSULE | Refills: 0 | Status: SHIPPED | OUTPATIENT
Start: 2019-11-12 | End: 2020-01-07

## 2019-11-12 NOTE — PROGRESS NOTES
Chief Complaint   Patient presents with   • Shortness of Breath     follow up after seeing Cardiology        Subjective   Shai Chery is a 73 y.o. male who presents today for fatigue, nocturia and sinus congestion    HPI:  Pt has fatigue.  He has seen the cardiology and clear from that stand point.  He is getting up every 2 hours to urinate.  Previous was going to have a prostate procedure but cancelled.  Taking both meds no helping.  Pt had been suggested to have a sleep study by cardiology.  He does not want to do that.  Feels it is the urination that is cause the sleep issues    He also has had a cough and sinus congestion with pressure and drainage.   His fatigue is better.  Platelet were stable    Shai Chery  has a past medical history of Abnormal laboratory test (03/26/2018), Alcoholic polyneuropathy (CMS/[facility]), Allergic rhinitis (05/01/2013), Anxiety (05/23/2016), Arthritis (12/06/2016), Bitten by dog, initial encounter (10/16/2018), Body mass index 30.0-30.9, adult (12/22/2017), Body mass index 31.0-31.9, adult (01/16/2018), Brain mass, Callus of foot (12/22/2016), Cellulitis and abscess of leg (12/12/2017), COPD (chronic obstructive pulmonary disease) (CMS/[facility]), Cough (12/06/2016), Degenerative joint disease of knee, right (03/26/2018), Depressive disorder, Derangement of medial meniscus of right knee (05/31/2017), Diarrhea (04/25/2018), Dysphagia (11/01/2017), 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), Encounter for preventative adult health care examination (12/18/2014), Facial skin lesion (05/10/2018), Foot pain, left (12/22/2016), Foot pain, right (12/22/2016), Gastroenteritis (04/25/2018), GERD (gastroesophageal reflux disease), History of knee replacement procedure of right knee (01/24/2019), History of skin cancer, History of squamous cell carcinoma of skin, Hyperlipidemia (05/01/2013), Hypertension (05/01/2013), Hypothyroidism, Hypothyroidism (05/01/2013), Impotence of  organic origin, Insomnia, Internal derangement of knee (05/05/2014), Knee pain, right (05/04/2017), Knee pain, right (04/03/2017), Low back pain, Medication monitoring encounter (04/19/2019), Meningioma (CMS/HCC) (06/09/2014), Nasal polyp (10/06/2017), Neoplasm of uncertain behavior (10/11/2018), Neoplasm of uncertain behavior of skin (05/04/2015), Neuropathy (12/18/2014), No known drug allergy, Obesity, Orthopedic aftercare (06/12/2017), Overweight (12/12/2017), Pain in joint of right ankle (05/31/2017), Pain management, Palpitations (12/22/2017), Pre-op exam (12/18/2018), Prostatic hypertrophy (06/09/2014), Puncture wound without foreign body of left hand, initial encounter (10/16/2018), Rash (06/28/2017), Rotator cuff tear (05/19/2014), Shortness of breath (02/02/2018), Sinus tachycardia (03/16/2018), and Tobacco use.    No Known Allergies    Current Outpatient Medications:   •  atorvastatin (LIPITOR) 20 MG tablet, Take 20 mg by mouth Daily., Disp: , Rfl:   •  dutasteride (AVODART) 0.5 MG capsule, 1 capsule Daily., Disp: , Rfl:   •  gabapentin (NEURONTIN) 300 MG capsule, TAKE 1 CAPSULE IN THE MORNING  AND AT NOON,  AND TAKE 3 CAPSULES AT BEDTIME, Disp: 450 capsule, Rfl: 3  •  Garlic 1000 MG capsule, Take 1 capsule by mouth Daily., Disp: , Rfl:   •  levothyroxine (SYNTHROID, LEVOTHROID) 200 MCG tablet, Daily., Disp: , Rfl:   •  lisinopril (PRINIVIL,ZESTRIL) 40 MG tablet, Take  by mouth Daily., Disp: , Rfl:   •  metoprolol tartrate (LOPRESSOR) 50 MG tablet, Every 12 (Twelve) Hours., Disp: , Rfl:   •  Omega-3 Fatty Acids ([employer] FISH OIL) 1200 MG capsule, Take 2 capsules by mouth Daily., Disp: , Rfl:   •  tamsulosin (FLOMAX) 0.4 MG capsule 24 hr capsule, Take 0.4 mg by mouth Daily., Disp: , Rfl:   •  doxycycline (MONODOX) 100 MG capsule, Take 1 capsule by mouth 2 (Two) Times a Day., Disp: 20 capsule, Rfl: 0  Past Medical History:   Diagnosis Date   • Abnormal laboratory test 03/26/2018   • Alcoholic polyneuropathy  (CMS/[facility])    • Allergic rhinitis 05/01/2013   • Anxiety 05/23/2016   • Arthritis 12/06/2016   • Bitten by dog, initial encounter 10/16/2018   • Body mass index 30.0-30.9, adult 12/22/2017   • Body mass index 31.0-31.9, adult 01/16/2018   • Brain mass    • Callus of foot 12/22/2016    Calluses, feet, bilateral   • Cellulitis and abscess of leg 12/12/2017   • COPD (chronic obstructive pulmonary disease) (CMS/[facility])    • Cough 12/06/2016   • Degenerative joint disease of knee, right 03/26/2018    primary localized degenerative joint disease of right knee.    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 However diet modification and compliance with medication is discussed with the patient. Patient is advised to have periodic AST, ALT, and lipid panel testing through your office.    • Hypertension 05/01/2013    Benign. 12-14-18- patient's blood pressure is within desireable ranges. At this time, I would not recommend any change in antihypertensive regimen. However, patient is advised to check the blood pressure periodically at home and bring the logbook on next visit. Patient is also encouraged to increase aerobic activities as tolerated. Risk factor modification is discussed.    • Hypothyroidism    • Hypothyroidism 05/01/2013   • Impotence of organic origin     • Insomnia    • Internal derangement of knee 05/05/2014 6-9-14- He saw Dr. Sams and Dr. Concepcion. He had fluid drained from his knee with negative cultures and negative crystals. Both were not concerned about the MRI (report not avilable), but he has a radiologist friend who read it as having meniscal damage. He is offered referral back to the orthopedist of his choice and will thnk about who he wants to see. He has upcoming shoulder surgery.    • Knee pain, right 05/04/2017   • Knee pain, right 04/03/2017   • Low back pain    • Medication monitoring encounter 04/19/2019   • Meningioma (CMS/HCC) 06/09/2014   • Nasal polyp 10/06/2017   • Neoplasm of uncertain behavior 10/11/2018   • Neoplasm of uncertain behavior of skin 05/04/2015   • Neuropathy 12/18/2014   • No known drug allergy    • Obesity    • Orthopedic aftercare 06/12/2017   • Overweight 12/12/2017   • Pain in joint of right ankle 05/31/2017   • Pain management     [facility]    • Palpitations 12/22/2017    3-16-18- his symptom of palpitation have improved. Continue beta-blockers.    • Pre-op exam 12/18/2018   • Prostatic hypertrophy 06/09/2014    Benign   • Puncture wound without foreign body of left hand, initial encounter 10/16/2018   • Rash 06/28/2017   • Rotator cuff tear 05/19/2014    left   • Shortness of breath 02/02/2018   • Sinus tachycardia 03/16/2018 12-14-18- much better. Continue current treat.    • Tobacco use      Past Surgical History:   Procedure Laterality Date   • ANKLE HARDWARE REMOVAL Right    • APPENDECTOMY     • KNEE ARTHROSCOPY  06/09/2017    and 12/5/2014. Dr Gallego. With partial medial and lateral miniscectomy.    • KNEE ARTHROSCOPY Right 06/09/2017   • KNEE ARTHROSCOPY W/ MENISCECTOMY Right 09/18/2018    right knee scope/meniscectomy   • LUMBAR DISC SURGERY  11/11/2015    Right L3-4 diskectomy. Dr. Pollard.    • ROTATOR CUFF REPAIR  07/2014    Dr Gallego.   • TONSILLECTOMY     • TOTAL KNEE ARTHROPLASTY Right  01/16/2019    Right TKR   • TOTAL KNEE ARTHROPLASTY Right 01/16/2019    Dr. Crain     Social History     Socioeconomic History   • Marital status:      Spouse name: Not on file   • Number of children: Not on file   • Years of education: Not on file   • Highest education level: Not on file   Tobacco Use   • Smoking status: Former Smoker   • Smokeless tobacco: Never Used   Substance and Sexual Activity   • Alcohol use: Yes   • Drug use: No   • Sexual activity: Defer     Family History   Problem Relation Age of Onset   • Diabetes Mother    • Stroke Mother    • Other Mother         Thyroid Disease   • Heart disease Mother         Ischemic   • Hypertension Father    • Heart disease Father         Ischemic   • Hyperlipidemia Father        Family history, surgical history, past medical history, Allergies and med's reviewed with patient today and updated in [employer] EMR.   PHQ-2 Depression Screening  Little interest or pleasure in doing things?     Feeling down, depressed, or hopeless?     PHQ-2 Total Score     PHQ-9 Depression Screening  Little interest or pleasure in doing things?     Feeling down, depressed, or hopeless?     Trouble falling or staying asleep, or sleeping too much?     Feeling tired or having little energy?     Poor appetite or overeating?     Feeling bad about yourself - or that you are a failure or have let yourself or your family down?     Trouble concentrating on things, such as reading the newspaper or watching television?     Moving or speaking so slowly that other people could have noticed? Or the opposite - being so fidgety or restless that you have been moving around a lot more than usual?     Thoughts that you would be better off dead, or of hurting yourself in some way?     PHQ-9 Total Score     If you checked off any problems, how difficult have these problems made it for you to do your work, take care of things at home, or get along with other people?       ROS:  Review of Systems  "  Constitutional: Negative for fatigue and fever.   HENT: Positive for postnasal drip and sinus pain.    Respiratory: Negative for cough and chest tightness.    Cardiovascular: Negative for chest pain, palpitations and leg swelling.        Controlled with metoprolol   Gastrointestinal: Negative for abdominal pain, diarrhea and nausea.   Genitourinary: Negative for dysuria.   Musculoskeletal: Positive for arthralgias.       OBJECTIVE:  Vitals:    11/12/19 0925   BP: 133/83   BP Location: Right arm   Patient Position: Sitting   Cuff Size: Adult   Pulse: 71   Resp: 16   Temp: 98.1 °F (36.7 °C)   TempSrc: Oral   SpO2: 98%   Weight: 95.3 kg (210 lb)   Height: 177.8 cm (70\")     Physical Exam   Constitutional: He is oriented to person, place, and time. He appears well-developed.   HENT:   Head: Normocephalic and atraumatic.   Right Ear: Tympanic membrane, external ear and ear canal normal.   Left Ear: Tympanic membrane, external ear and ear canal normal.   Nose: Right sinus exhibits maxillary sinus tenderness. Left sinus exhibits maxillary sinus tenderness.   Mouth/Throat: Uvula is midline, oropharynx is clear and moist and mucous membranes are normal.   Post nasal discharge   Eyes: Conjunctivae and EOM are normal. Pupils are equal, round, and reactive to light.   Neck: Normal range of motion. Neck supple.   Cardiovascular: Regular rhythm and normal heart sounds. Exam reveals no gallop and no friction rub.   No murmur heard.  Pulmonary/Chest: Effort normal and breath sounds normal.   Abdominal: Soft. Bowel sounds are normal. He exhibits no distension. There is no tenderness.   Neurological: He is alert and oriented to person, place, and time.   Skin: Skin is warm and dry.   Nursing note and vitals reviewed.      ASSESSMENT/ PLAN:    Shai was seen today for shortness of breath.    Diagnoses and all orders for this visit:    Nocturia  Comments:  has urology appt  Orders:  -     Ambulatory Referral to Urology  -     Cancel: " Urine Culture - Urine, Urine, Random Void; Future  -     Urine Culture - Urine, Urine, Clean Catch  -     POC Urinalysis Dipstick, Automated    Acute non-recurrent maxillary sinusitis    Insomnia, unspecified type    Thrombocytopenia (CMS/HCC)  Comments:  stable    Benign prostatic hyperplasia with nocturia    Other orders  -     doxycycline (MONODOX) 100 MG capsule; Take 1 capsule by mouth 2 (Two) Times a Day.        Orders Placed Today:     New Medications Ordered This Visit   Medications   • doxycycline (MONODOX) 100 MG capsule     Sig: Take 1 capsule by mouth 2 (Two) Times a Day.     Dispense:  20 capsule     Refill:  0        Management Plan:     An After Visit Summary was printed and given to the patient at discharge.    Follow-up: Return if symptoms worsen or fail to improve.    JUAN Curry 11/12/2019 10:57 AM  This note was electronically signed.

## 2019-11-14 LAB
BACTERIA UR CULT: NO GROWTH
BACTERIA UR CULT: NORMAL

## 2019-11-18 RX ORDER — TAMSULOSIN HYDROCHLORIDE 0.4 MG/1
0.4 CAPSULE ORAL DAILY
Qty: 90 CAPSULE | Refills: 0 | Status: SHIPPED | OUTPATIENT
Start: 2019-11-18 | End: 2019-11-19 | Stop reason: SDUPTHER

## 2019-11-18 RX ORDER — ATORVASTATIN CALCIUM 20 MG/1
20 TABLET, FILM COATED ORAL DAILY
Qty: 90 TABLET | Refills: 0 | Status: SHIPPED | OUTPATIENT
Start: 2019-11-18 | End: 2019-11-19 | Stop reason: SDUPTHER

## 2019-11-18 RX ORDER — LEVOTHYROXINE SODIUM 0.2 MG/1
200 TABLET ORAL EVERY 24 HOURS
Qty: 90 TABLET | Refills: 0 | Status: SHIPPED | OUTPATIENT
Start: 2019-11-18 | End: 2019-11-19 | Stop reason: SDUPTHER

## 2019-11-18 RX ORDER — METOPROLOL TARTRATE 50 MG/1
50 TABLET, FILM COATED ORAL EVERY 12 HOURS
Qty: 180 TABLET | Refills: 0 | Status: SHIPPED | OUTPATIENT
Start: 2019-11-18 | End: 2019-11-19 | Stop reason: SDUPTHER

## 2019-11-18 RX ORDER — LISINOPRIL 40 MG/1
40 TABLET ORAL DAILY
Qty: 90 TABLET | Refills: 0 | Status: SHIPPED | OUTPATIENT
Start: 2019-11-18 | End: 2019-11-19 | Stop reason: SDUPTHER

## 2019-11-19 RX ORDER — LISINOPRIL 40 MG/1
40 TABLET ORAL DAILY
Qty: 90 TABLET | Refills: 0 | Status: SHIPPED | OUTPATIENT
Start: 2019-11-19 | End: 2019-12-04

## 2019-11-19 RX ORDER — ATORVASTATIN CALCIUM 20 MG/1
20 TABLET, FILM COATED ORAL DAILY
Qty: 90 TABLET | Refills: 0 | Status: SHIPPED | OUTPATIENT
Start: 2019-11-19 | End: 2020-02-12 | Stop reason: SDUPTHER

## 2019-11-19 RX ORDER — LEVOTHYROXINE SODIUM 0.2 MG/1
200 TABLET ORAL EVERY 24 HOURS
Qty: 90 TABLET | Refills: 0 | Status: SHIPPED | OUTPATIENT
Start: 2019-11-19 | End: 2020-02-12 | Stop reason: SDUPTHER

## 2019-11-19 RX ORDER — METOPROLOL TARTRATE 50 MG/1
50 TABLET, FILM COATED ORAL EVERY 12 HOURS
Qty: 180 TABLET | Refills: 0 | Status: SHIPPED | OUTPATIENT
Start: 2019-11-19 | End: 2020-02-12 | Stop reason: SDUPTHER

## 2019-11-19 RX ORDER — TAMSULOSIN HYDROCHLORIDE 0.4 MG/1
0.4 CAPSULE ORAL DAILY
Qty: 90 CAPSULE | Refills: 0 | Status: SHIPPED | OUTPATIENT
Start: 2019-11-19 | End: 2019-12-23 | Stop reason: SDUPTHER

## 2019-12-04 ENCOUNTER — OFFICE VISIT (OUTPATIENT)
Dept: FAMILY MEDICINE CLINIC | Facility: CLINIC | Age: 74
End: 2019-12-04

## 2019-12-04 VITALS
SYSTOLIC BLOOD PRESSURE: 128 MMHG | OXYGEN SATURATION: 96 % | HEIGHT: 70 IN | BODY MASS INDEX: 30.46 KG/M2 | WEIGHT: 212.8 LBS | TEMPERATURE: 98.5 F | HEART RATE: 78 BPM | DIASTOLIC BLOOD PRESSURE: 72 MMHG | RESPIRATION RATE: 16 BRPM

## 2019-12-04 DIAGNOSIS — E66.3 OVERWEIGHT: ICD-10-CM

## 2019-12-04 DIAGNOSIS — R05.9 COUGH: ICD-10-CM

## 2019-12-04 DIAGNOSIS — E55.9 VITAMIN D DEFICIENCY: ICD-10-CM

## 2019-12-04 DIAGNOSIS — R06.02 SHORTNESS OF BREATH: ICD-10-CM

## 2019-12-04 DIAGNOSIS — R53.83 OTHER FATIGUE: Primary | ICD-10-CM

## 2019-12-04 PROCEDURE — 99213 OFFICE O/P EST LOW 20 MIN: CPT | Performed by: FAMILY MEDICINE

## 2019-12-04 RX ORDER — ALBUTEROL SULFATE 90 UG/1
2 AEROSOL, METERED RESPIRATORY (INHALATION) EVERY 4 HOURS PRN
Qty: 1 INHALER | Refills: 2 | Status: SHIPPED | OUTPATIENT
Start: 2019-12-04 | End: 2019-12-04

## 2019-12-04 RX ORDER — ALBUTEROL SULFATE 90 UG/1
2 AEROSOL, METERED RESPIRATORY (INHALATION) EVERY 4 HOURS PRN
Qty: 1 INHALER | Refills: 2 | Status: SHIPPED | OUTPATIENT
Start: 2019-12-04 | End: 2020-03-05

## 2019-12-04 NOTE — PROGRESS NOTES
Subjective   Shai Chery is a 73 y.o. male.     Chief Complaint   Patient presents with   • Fatigue       Fatigue   This is a new problem. The current episode started 1 to 4 weeks ago. Associated symptoms include coughing and fatigue. Pertinent negatives include no abdominal pain, chest pain, chills, diaphoresis, fever, headaches, myalgias, nausea or rash.   Cough   This is a new problem. The current episode started 1 to 4 weeks ago. The problem has been waxing and waning. The problem occurs every few hours. The cough is productive of sputum. Associated symptoms include nasal congestion, shortness of breath and wheezing. Pertinent negatives include no chest pain, chills, ear congestion, ear pain, fever, headaches, hemoptysis, myalgias, postnasal drip, rash, sweats or weight loss. Risk factors for lung disease include smoking/tobacco exposure.            I personally reviewed and updated the patient's allergies, medications, problem list, and past medical, surgical, social, and family history.     Family History   Problem Relation Age of Onset   • Diabetes Mother    • Stroke Mother    • Other Mother         Thyroid Disease   • Heart disease Mother         Ischemic   • Cancer Mother    • COPD Mother    • Hypertension Father    • Heart disease Father         Ischemic   • Hyperlipidemia Father    • Cancer Father    • Arthritis Paternal Grandmother    • Cancer Sister    • Diabetes Maternal Grandmother    • Hearing loss Sister        Social History     Tobacco Use   • Smoking status: Current Every Day Smoker     Packs/day: 1.00     Years: 40.00     Pack years: 40.00   • Smokeless tobacco: Never Used   • Tobacco comment: quit and relapsed 4 times   Substance Use Topics   • Alcohol use: Yes     Types: 2 Glasses of wine per week   • Drug use: No       Past Surgical History:   Procedure Laterality Date   • ANKLE HARDWARE REMOVAL Right    • APPENDECTOMY  1977   • COLONOSCOPY      every 3 years with GSI   • EYE SURGERY  Bilateral     cataracts   • JOINT REPLACEMENT Right 2019    knee   • KNEE ARTHROSCOPY  06/09/2017    and 12/5/2014. Dr Gallego. With partial medial and lateral miniscectomy.    • KNEE ARTHROSCOPY Right 06/09/2017   • KNEE ARTHROSCOPY W/ MENISCECTOMY Right 09/18/2018    right knee scope/meniscectomy   • LUMBAR DISC SURGERY  11/11/2015    Right L3-4 diskectomy. Dr. Pollard.    • ROTATOR CUFF REPAIR  07/2014    Dr Gallego.   • THYROIDECTOMY  1971   • TONSILLECTOMY  1966   • TOTAL KNEE ARTHROPLASTY Right 01/16/2019    Right TKR   • TOTAL KNEE ARTHROPLASTY Right 01/16/2019    Dr. Crain       Patient Active Problem List   Diagnosis   • Status post total knee replacement, right   • Primary osteoarthritis of left knee   • Occipital neuralgia   • Arthritis   • Benign prostatic hyperplasia   • Derangement of medial meniscus of right knee   • Oral phase dysphagia   • Dystrophia unguium   • Mixed hyperlipidemia   • Hypothyroidism   • Meningioma (CMS/HCC)   • Neuropathy   • Overweight   • Primary localized osteoarthritis   • Nasal polyp   • Rotator cuff tear   • Presence of right artificial knee joint   • Abnormal findings on imaging test   • Alcoholic polyneuropathy (CMS/HCC)   • Brain mass   • COPD (chronic obstructive pulmonary disease) (CMS/HCC)   • Diverticulosis   • Fracture of fibula   • Gastro-esophageal reflux   • Hypoglycemia   • Impotence of organic origin   • Insomnia   • Thrombocytopenia (CMS/HCC)   • Tubular adenoma of colon   • Sinus tachycardia   • Essential hypertension   • Chest pain   • Dyspnea   • Other fatigue   • Shortness of breath         Current Outpatient Medications:   •  atorvastatin (LIPITOR) 20 MG tablet, Take 1 tablet by mouth Daily., Disp: 90 tablet, Rfl: 0  •  doxycycline (MONODOX) 100 MG capsule, Take 1 capsule by mouth 2 (Two) Times a Day., Disp: 20 capsule, Rfl: 0  •  dutasteride (AVODART) 0.5 MG capsule, 1 capsule Daily., Disp: , Rfl:   •  gabapentin (NEURONTIN) 300 MG capsule, TAKE 1 CAPSULE IN  "THE MORNING  AND AT NOON,  AND TAKE 3 CAPSULES AT BEDTIME, Disp: 450 capsule, Rfl: 3  •  Garlic 1000 MG capsule, Take 1 capsule by mouth Daily., Disp: , Rfl:   •  levothyroxine (SYNTHROID, LEVOTHROID) 200 MCG tablet, Take 1 tablet by mouth Daily., Disp: 90 tablet, Rfl: 0  •  metoprolol tartrate (LOPRESSOR) 50 MG tablet, Take 1 tablet by mouth Every 12 (Twelve) Hours., Disp: 180 tablet, Rfl: 0  •  Omega-3 Fatty Acids ([employer] FISH OIL) 1200 MG capsule, Take 2 capsules by mouth Daily., Disp: , Rfl:   •  tamsulosin (FLOMAX) 0.4 MG capsule 24 hr capsule, Take 1 capsule by mouth Daily., Disp: 90 capsule, Rfl: 0  •  albuterol sulfate  (90 Base) MCG/ACT inhaler, Inhale 2 puffs Every 4 (Four) Hours As Needed for Wheezing., Disp: 1 inhaler, Rfl: 2         Review of Systems   Constitutional: Positive for fatigue. Negative for chills, diaphoresis, fever and unexpected weight loss.   HENT: Negative for ear pain and postnasal drip.    Eyes: Negative for visual disturbance.   Respiratory: Positive for cough, shortness of breath and wheezing. Negative for hemoptysis.    Cardiovascular: Negative for chest pain and palpitations.   Gastrointestinal: Negative for abdominal pain and nausea.   Endocrine: Negative for polydipsia and polyphagia.   Musculoskeletal: Negative for myalgias and neck stiffness.   Skin: Negative for color change, pallor and rash.   Neurological: Negative for seizures and syncope.   Hematological: Negative for adenopathy.   Psychiatric/Behavioral: Negative for hallucinations and suicidal ideas.       Objective   /72   Pulse 78   Temp 98.5 °F (36.9 °C)   Resp 16   Ht 177.8 cm (70\")   Wt 96.5 kg (212 lb 12.8 oz)   SpO2 96%   BMI 30.53 kg/m²   Wt Readings from Last 3 Encounters:   12/04/19 96.5 kg (212 lb 12.8 oz)   11/12/19 95.3 kg (210 lb)   11/06/19 93.4 kg (206 lb)     Physical Exam   Constitutional: He is oriented to person, place, and time. He appears well-developed and well-nourished. "   Cardiovascular: Normal rate, regular rhythm, S1 normal, S2 normal, normal heart sounds, intact distal pulses and normal pulses. Exam reveals no gallop and no friction rub.   No murmur heard.  Pulmonary/Chest: Effort normal and breath sounds normal. No accessory muscle usage or stridor. He has no decreased breath sounds. He has no wheezes. He has no rhonchi. He has no rales.   Abdominal: Soft. Normal appearance, normal aorta and bowel sounds are normal. He exhibits no distension, no pulsatile midline mass and no mass. There is no hepatosplenomegaly. There is no tenderness. There is no rigidity, no rebound, no guarding, no CVA tenderness and negative Marc's sign. No hernia.   Neurological: He is alert and oriented to person, place, and time. Coordination and gait normal.   Skin: Skin is warm and dry. Turgor is normal. He is not diaphoretic. No pallor.         Assessment/Plan       Shortness of breath.  Recent echocardiogram benign.  Longstanding smoking history, DDX includes COPD.,  Check PFTs.  Chest x-ray normal.  Follow-up recheck.  Consider cardiac stress testing if persistent symptoms  Fatigue.  Blood work benign.  He declines sleep study.  Consider add Wellbutrin.  Hypothyroidism.  Free T4 high, adjust medication.  Hypogonadism.  His recent testosterone levels are high.  Recommend discontinue testosterone considering his age.  He has a urology follow-up scheduled.  Tobacco use.  Encourage cessation.  .  Problem List Items Addressed This Visit        Respiratory    Shortness of breath       Other    Overweight    Other fatigue - Primary    Relevant Orders    Vitamin B12 (Completed)    T4, Free (Completed)    Folate (Completed)      Other Visit Diagnoses     Cough        Relevant Orders    XR Chest PA & Lateral (Completed)    Vitamin D deficiency        Relevant Orders    Vitamin D 25 Hydroxy (Completed)              Expected course, medications, and adverse effects discussed.  Call or return if worsening or  persistent symptoms.

## 2019-12-07 LAB
25(OH)D3+25(OH)D2 SERPL-MCNC: 31.9 NG/ML (ref 30–100)
FOLATE SERPL-MCNC: 16.4 NG/ML
T4 FREE SERPL-MCNC: 1.94 NG/DL (ref 0.82–1.77)
VIT B12 SERPL-MCNC: >2000 PG/ML (ref 232–1245)

## 2019-12-10 ENCOUNTER — TELEPHONE (OUTPATIENT)
Dept: FAMILY MEDICINE CLINIC | Facility: CLINIC | Age: 74
End: 2019-12-10

## 2019-12-10 NOTE — TELEPHONE ENCOUNTER
Patient is due for repeat labs but it looks like he is now seeing Dr. Guidry. Okay to delete reminder?

## 2019-12-12 ENCOUNTER — TELEPHONE (OUTPATIENT)
Dept: FAMILY MEDICINE CLINIC | Facility: CLINIC | Age: 74
End: 2019-12-12

## 2019-12-12 ENCOUNTER — HOSPITAL ENCOUNTER (OUTPATIENT)
Dept: GENERAL RADIOLOGY | Facility: HOSPITAL | Age: 74
Discharge: HOME | End: 2019-12-12
Admitting: FAMILY MEDICINE

## 2019-12-12 PROCEDURE — 71046 X-RAY EXAM CHEST 2 VIEWS: CPT

## 2019-12-12 NOTE — TELEPHONE ENCOUNTER
----- Message from Gilda Krishnamurthy sent at 12/11/2019  4:36 PM EST -----  Call him still and remind him that he is due and to notify Dr. Guidry's office since he is now his PCP.     ----- Message -----  From: Deann Chacon MA  Sent: 12/10/2019   2:52 PM EST  To: Gilda Krishnamurthy    This patient is due for labs and when I went to look it looks like he is now seeing Dr. Guidry.

## 2019-12-17 ENCOUNTER — TELEPHONE (OUTPATIENT)
Dept: FAMILY MEDICINE CLINIC | Facility: CLINIC | Age: 74
End: 2019-12-17

## 2019-12-17 NOTE — TELEPHONE ENCOUNTER
Called and left a message for him to call me back and let me know what strength synthroid he is taking and how long has he been taking it that way.

## 2019-12-17 NOTE — TELEPHONE ENCOUNTER
----- Message from Jl Guidry MD sent at 12/17/2019  9:13 AM EST -----  Let him know his blood work overall looks good, vitamin levels normal, his thyroid levels are high/hyperactive, see if he has had his thyroid medication adjusted recently?

## 2019-12-17 NOTE — PROGRESS NOTES
Let him know his blood work overall looks good, vitamin levels normal, his thyroid levels are high/hyperactive, see if he has had his thyroid medication adjusted recently?

## 2019-12-22 ENCOUNTER — TELEPHONE (OUTPATIENT)
Dept: FAMILY MEDICINE CLINIC | Facility: CLINIC | Age: 74
End: 2019-12-22

## 2019-12-22 PROBLEM — R06.00 DYSPNEA: Status: ACTIVE | Noted: 2019-11-06

## 2019-12-22 PROBLEM — R06.02 SHORTNESS OF BREATH: Status: ACTIVE | Noted: 2018-02-02

## 2019-12-22 NOTE — TELEPHONE ENCOUNTER
Let him know his PFTs are normal, his symptoms could still be coming from COPD, see how its going with albuterol, is it helping with his breathing?  Thanks.

## 2019-12-23 DIAGNOSIS — N40.1 BENIGN PROSTATIC HYPERPLASIA WITH NOCTURIA: Primary | ICD-10-CM

## 2019-12-23 DIAGNOSIS — R35.1 BENIGN PROSTATIC HYPERPLASIA WITH NOCTURIA: Primary | ICD-10-CM

## 2019-12-23 RX ORDER — TAMSULOSIN HYDROCHLORIDE 0.4 MG/1
0.4 CAPSULE ORAL DAILY
Qty: 10 CAPSULE | Refills: 0 | Status: SHIPPED | OUTPATIENT
Start: 2019-12-23 | End: 2019-12-27

## 2019-12-26 DIAGNOSIS — R35.1 BENIGN PROSTATIC HYPERPLASIA WITH NOCTURIA: ICD-10-CM

## 2019-12-26 DIAGNOSIS — N40.1 BENIGN PROSTATIC HYPERPLASIA WITH NOCTURIA: ICD-10-CM

## 2019-12-27 RX ORDER — TAMSULOSIN HYDROCHLORIDE 0.4 MG/1
0.4 CAPSULE ORAL DAILY
Qty: 90 CAPSULE | Refills: 1 | Status: SHIPPED | OUTPATIENT
Start: 2019-12-27 | End: 2020-01-07

## 2020-01-06 ENCOUNTER — OFFICE VISIT (OUTPATIENT)
Dept: FAMILY MEDICINE CLINIC | Facility: CLINIC | Age: 75
End: 2020-01-06

## 2020-01-06 VITALS
TEMPERATURE: 98.5 F | HEART RATE: 70 BPM | HEIGHT: 70 IN | SYSTOLIC BLOOD PRESSURE: 142 MMHG | BODY MASS INDEX: 30.55 KG/M2 | RESPIRATION RATE: 18 BRPM | OXYGEN SATURATION: 95 % | DIASTOLIC BLOOD PRESSURE: 86 MMHG | WEIGHT: 213.4 LBS

## 2020-01-06 DIAGNOSIS — I10 ESSENTIAL HYPERTENSION: Chronic | ICD-10-CM

## 2020-01-06 DIAGNOSIS — J44.1 CHRONIC OBSTRUCTIVE PULMONARY DISEASE WITH ACUTE EXACERBATION (HCC): Primary | Chronic | ICD-10-CM

## 2020-01-06 DIAGNOSIS — E66.3 OVERWEIGHT: ICD-10-CM

## 2020-01-06 DIAGNOSIS — E03.9 HYPOTHYROIDISM, UNSPECIFIED TYPE: ICD-10-CM

## 2020-01-06 DIAGNOSIS — E78.2 MIXED HYPERLIPIDEMIA: Chronic | ICD-10-CM

## 2020-01-06 DIAGNOSIS — Z72.0 TOBACCO USE: ICD-10-CM

## 2020-01-06 DIAGNOSIS — G62.1 ALCOHOLIC POLYNEUROPATHY (HCC): ICD-10-CM

## 2020-01-06 PROBLEM — J44.9 COPD (CHRONIC OBSTRUCTIVE PULMONARY DISEASE) (HCC): Chronic | Status: ACTIVE | Noted: 2019-10-15

## 2020-01-06 PROCEDURE — 99214 OFFICE O/P EST MOD 30 MIN: CPT | Performed by: FAMILY MEDICINE

## 2020-01-06 NOTE — PROGRESS NOTES
Subjective   Shai Chery is a 74 y.o. male.     Chief Complaint   Patient presents with   • Cough   • Fatigue       Fatigue   This is a new problem. The current episode started 1 to 4 weeks ago. The problem has been gradually improving. Associated symptoms include coughing and fatigue. Pertinent negatives include no abdominal pain, chest pain, chills, diaphoresis, fever, headaches, myalgias, nausea or rash.   Cough   This is a new problem. The current episode started 1 to 4 weeks ago. The problem has been gradually improving. The problem occurs every few hours. The cough is productive of sputum. Associated symptoms include nasal congestion, shortness of breath and wheezing. Pertinent negatives include no chest pain, chills, ear congestion, ear pain, fever, headaches, hemoptysis, myalgias, postnasal drip, rash, sweats or weight loss. Risk factors for lung disease include smoking/tobacco exposure.            I personally reviewed and updated the patient's allergies, medications, problem list, and past medical, surgical, social, and family history.     Family History   Problem Relation Age of Onset   • Diabetes Mother    • Stroke Mother    • Other Mother         Thyroid Disease   • Heart disease Mother         Ischemic   • Cancer Mother    • COPD Mother    • Hypertension Father    • Heart disease Father         Ischemic   • Hyperlipidemia Father    • Cancer Father    • Arthritis Paternal Grandmother    • Cancer Sister    • Diabetes Maternal Grandmother    • Hearing loss Sister        Social History     Tobacco Use   • Smoking status: Current Every Day Smoker     Packs/day: 1.00     Years: 40.00     Pack years: 40.00   • Smokeless tobacco: Never Used   • Tobacco comment: quit and relapsed 4 times   Substance Use Topics   • Alcohol use: Yes     Types: 2 Glasses of wine per week   • Drug use: No       Past Surgical History:   Procedure Laterality Date   • ANKLE HARDWARE REMOVAL Right    • APPENDECTOMY  1977   •  COLONOSCOPY      every 3 years with GSI   • EYE SURGERY Bilateral     cataracts   • JOINT REPLACEMENT Right 2019    knee   • KNEE ARTHROSCOPY  06/09/2017    and 12/5/2014. Dr Gallego. With partial medial and lateral miniscectomy.    • KNEE ARTHROSCOPY Right 06/09/2017   • KNEE ARTHROSCOPY W/ MENISCECTOMY Right 09/18/2018    right knee scope/meniscectomy   • LUMBAR DISC SURGERY  11/11/2015    Right L3-4 diskectomy. Dr. Pollard.    • ROTATOR CUFF REPAIR  07/2014    Dr Gallego.   • THYROIDECTOMY  1971   • TONSILLECTOMY  1966   • TOTAL KNEE ARTHROPLASTY Right 01/16/2019    Right TKR   • TOTAL KNEE ARTHROPLASTY Right 01/16/2019    Dr. Crain       Patient Active Problem List   Diagnosis   • Status post total knee replacement, right   • Primary osteoarthritis of left knee   • Occipital neuralgia   • Arthritis   • Benign prostatic hyperplasia   • Derangement of medial meniscus of right knee   • Oral phase dysphagia   • Dystrophia unguium   • Mixed hyperlipidemia   • Hypothyroidism   • Meningioma (CMS/HCC)   • Neuropathy   • Overweight   • Primary localized osteoarthritis   • Nasal polyp   • Rotator cuff tear   • Presence of right artificial knee joint   • Abnormal findings on imaging test   • Alcoholic polyneuropathy (CMS/HCC)   • Brain mass   • COPD (chronic obstructive pulmonary disease) (CMS/HCC)   • Diverticulosis   • Fracture of fibula   • Gastro-esophageal reflux   • Hypoglycemia   • Impotence of organic origin   • Insomnia   • Thrombocytopenia (CMS/HCC)   • Tubular adenoma of colon   • Sinus tachycardia   • Essential hypertension   • Chest pain   • Dyspnea   • Other fatigue   • Shortness of breath   • Tobacco use         Current Outpatient Medications:   •  albuterol sulfate  (90 Base) MCG/ACT inhaler, Inhale 2 puffs Every 4 (Four) Hours As Needed for Wheezing., Disp: 1 inhaler, Rfl: 2  •  atorvastatin (LIPITOR) 20 MG tablet, Take 1 tablet by mouth Daily., Disp: 90 tablet, Rfl: 0  •  doxycycline (MONODOX) 100 MG  "capsule, Take 1 capsule by mouth 2 (Two) Times a Day., Disp: 20 capsule, Rfl: 0  •  gabapentin (NEURONTIN) 300 MG capsule, TAKE 1 CAPSULE IN THE MORNING  AND AT NOON,  AND TAKE 3 CAPSULES AT BEDTIME, Disp: 450 capsule, Rfl: 3  •  Garlic 1000 MG capsule, Take 1 capsule by mouth Daily., Disp: , Rfl:   •  levothyroxine (SYNTHROID, LEVOTHROID) 200 MCG tablet, Take 1 tablet by mouth Daily., Disp: 90 tablet, Rfl: 0  •  metoprolol tartrate (LOPRESSOR) 50 MG tablet, Take 1 tablet by mouth Every 12 (Twelve) Hours., Disp: 180 tablet, Rfl: 0  •  Omega-3 Fatty Acids ([employer] FISH OIL) 1200 MG capsule, Take 2 capsules by mouth Daily., Disp: , Rfl:   •  dutasteride (AVODART) 0.5 MG capsule, 1 capsule Daily., Disp: , Rfl:   •  tamsulosin (FLOMAX) 0.4 MG capsule 24 hr capsule, TAKE 1 CAPSULE BY MOUTH DAILY, Disp: 90 capsule, Rfl: 1         Review of Systems   Constitutional: Positive for fatigue. Negative for chills, diaphoresis, fever and unexpected weight loss.   HENT: Negative for ear pain and postnasal drip.    Respiratory: Positive for cough, shortness of breath and wheezing. Negative for hemoptysis.    Cardiovascular: Negative for chest pain.   Gastrointestinal: Negative for abdominal pain and nausea.   Musculoskeletal: Negative for myalgias.   Skin: Negative for rash.   Psychiatric/Behavioral: Negative for hallucinations and suicidal ideas.       Objective   /86   Pulse 70   Temp 98.5 °F (36.9 °C)   Resp 18   Ht 177.8 cm (70\")   Wt 96.8 kg (213 lb 6.4 oz)   SpO2 95%   BMI 30.62 kg/m²   Wt Readings from Last 3 Encounters:   01/06/20 96.8 kg (213 lb 6.4 oz)   12/04/19 96.5 kg (212 lb 12.8 oz)   11/12/19 95.3 kg (210 lb)     Physical Exam   Constitutional: He is oriented to person, place, and time. He appears well-developed and well-nourished.   Cardiovascular: Normal rate, regular rhythm, S1 normal, S2 normal, normal heart sounds, intact distal pulses and normal pulses. Exam reveals no gallop and no friction rub.   No " murmur heard.  Pulmonary/Chest: Effort normal and breath sounds normal. No accessory muscle usage or stridor. He has no decreased breath sounds. He has no wheezes. He has no rhonchi. He has no rales.   Abdominal: Soft. Normal appearance, normal aorta and bowel sounds are normal. He exhibits no distension, no pulsatile midline mass and no mass. There is no hepatosplenomegaly. There is no tenderness. There is no rigidity, no rebound, no guarding, no CVA tenderness and negative Marc's sign. No hernia.   Neurological: He is alert and oriented to person, place, and time. Coordination and gait normal.   Skin: Skin is warm and dry. Turgor is normal. He is not diaphoretic. No pallor.         Assessment/Plan     Shortness of breath.    Improved today.  Has had cardiac stress testing, will get records.  Recent echocardiogram benign.  Longstanding smoking history, DDX includes COPD, improved on pulmonary toilet.,    PFTs normal.  Chest x-ray normal.  Follow-up recheck.  Fatigue.    Improved.  Blood work benign.  He declines sleep study.  Consider add Wellbutrin.  Hypothyroidism.  Free T4 mildly high, recheck labs 3 months.  Hypogonadism.  Off testosterone.  Followed by urology.  Tobacco use.  Encourage cessation.  Has cut back.  .  Hypertension.  Good control on metoprolol.  Hyperlipidemia.  Doing well on statin.  Continue fish oil.  Expect improvement in triglycerides with cessation testosterone, follow-up recheck.  Consider fibroid if persistent elevation.  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  Has quit drinking.      Problem List Items Addressed This Visit        Cardiovascular and Mediastinum    Mixed hyperlipidemia (Chronic)    Essential hypertension (Chronic)       Respiratory    COPD (chronic obstructive pulmonary disease) (CMS/HCC) - Primary (Chronic)    Overview     Quit smoking. Exercising. Continue to watch.            Endocrine    Hypothyroidism       Nervous and Auditory    Alcoholic polyneuropathy  (CMS/[facility])    Overview     Stable. Off gabapentin.            Other    Overweight    Tobacco use              Expected course, medications, and adverse effects discussed.  Call or return if worsening or persistent symptoms.

## 2020-01-07 ENCOUNTER — OFFICE VISIT (OUTPATIENT)
Dept: ORTHOPEDIC SURGERY | Facility: CLINIC | Age: 75
End: 2020-01-07

## 2020-01-07 VITALS
WEIGHT: 213 LBS | DIASTOLIC BLOOD PRESSURE: 83 MMHG | SYSTOLIC BLOOD PRESSURE: 136 MMHG | HEART RATE: 59 BPM | HEIGHT: 70 IN | BODY MASS INDEX: 30.49 KG/M2

## 2020-01-07 DIAGNOSIS — Z96.651 STATUS POST TOTAL KNEE REPLACEMENT, RIGHT: Primary | ICD-10-CM

## 2020-01-07 PROCEDURE — 99213 OFFICE O/P EST LOW 20 MIN: CPT | Performed by: ORTHOPAEDIC SURGERY

## 2020-01-07 NOTE — PROGRESS NOTES
POST OP TOTAL joint replacement office visit      NAME: Shai Chery           : 1945    MRN: [id number]    Chief Complaint   Patient presents with   • Right Knee - Follow-up   C: Follow-up on right total knee arthroplasty from a year ago and early to moderate left knee pain.    Date of Surgery: The patient underwent right total knee arthroplasty on 2019 performed by me.  ?  HPI:   Patient returns today for 12 month follow up of right total knee arthroplasty. Incision(s) healed nicely with no signs of infection. Patient reports doing well with no unusual complaints. No fevers, rigors or chills. Appears to be progressing appropriately. Patient is on appropriate anticoagulation.  He is exactly 1 year post total knee arthroplasty on the right side and has done extremely well from that surgical intervention.  He states that his quality of life has improved tremendously for the better.  He does not take any pain medication for those symptoms related to the knee anymore.  He has been very active outside the house and states that he is very pleased with his outcome.  There has been one episode of his right knee buckling when he was very tired and I have discussed with him about using a brace when he is out hiking or walking in the woods to prevent any further episodes of instability.  We have also discussed about working on the quads and hamstring strengthening to improve the proprioception around the knee.  He does have some pain and discomfort in his left knee as well.  The patient states that his symptoms are minimal and he is not interested in any surgical intervention to his left knee.  He has had a history of meningioma in the past and therefore wants to postpone surgery as long as possible on the left knee.  I certainly agree with this conservative line of management.      Review of Systems   Constitutional: Negative.  Negative for fever.   HENT: Negative.    Eyes: Negative.    Respiratory:  Negative.    Cardiovascular: Negative.    Endocrine: Negative.    Genitourinary: Negative.    Musculoskeletal: Positive for arthralgias, gait problem and joint swelling.   Skin: Negative.  Negative for rash and wound.   Allergic/Immunologic: Negative.    Neurological: Negative for numbness.   Hematological: Negative.    Psychiatric/Behavioral: Negative.    Physical exam:  Constitutional: Patient is oriented to person, place, and time. Appears well-developed and well-nourished.   HENT:   Head: Normocephalic and atraumatic.   Eyes: Conjunctivae and EOM are normal. Pupils are equal, round, and reactive to light.   Cardiovascular: Normal rate, regular rhythm, normal heart sounds and intact distal pulses.   Pulmonary/Chest: Effort normal and breath sounds normal.   Musculoskeletal:   See detailed exam below   Neurological: Alert and oriented to person, place, and time. No sensory deficit. Coordination normal.   Skin: Skin is warm and dry. Capillary refill takes less than 2 seconds. No rash noted. No erythema.   Psychiatric: Patient has a normal mood and affect. His behavior is normal. Judgment and thought content normal.   Nursing note and vitals reviewed.    Ortho Exam:   Right knee.The patient is status post total knee arthroplasty postoperative 1 year(s). Incision is clean. Calf is soft and nontender. Homans sign is negative. There is no clicking, popping or catching. Anterior and posterior drawer signs are negative.  There is no instability of the components. Appropriate amounts of swelling and bruising are noted. Dorsalis pedis and posterior tibial artery pulses are palpable. Common peroneal nerve function is well preserved. Range of motion is from 0-130 degrees of flexion. Gait is cautious but otherwise fairly normal. There is no evidence of a deep seated joint infection.    Left knee. Patient has crepitus throughout range of motion. Positive patellar grind test. Mild effusion. Lachman is negative. Pivot shift is  negative. Anterior and posterior drawer signs are negative. Significant joint line tenderness is noted on the medial aspect of the knee. Patient has a varus orientation of the knee. There is fullness and tenderness in the Popliteal fossa. Mild distention of a Popliteal cyst is noted in this location. Range of motion in flexion is from 0-120 degrees. Neurovascular status is intact.  Dorsalis pedis and posterior tibial artery pulses are palpable. Common peroneal nerve function is well preserved. Patient's gait is cautious and antalgic. Skin and soft tissues are mildly swollen, consistent with synovitis and effusion. The patient has a significant limp with the first few steps after starting the gait cycle. Getting out of a chair takes a lot of effort due to pain on knee flexion.  Diagnostic Studies:  Xrays as  diagnostic testing performed this visit  right Knee X-Ray  Indication: Evaluation of implant position after total knee arthroplasty.  AP, Lateral views  Findings: Well-placed implants with a good cement mantle.  no bony lesion  Soft tissues within normal limits  within normal limits joint spaces  Hardware appropriately positioned yes      yes prior studies available for comparison.    This patient's x-ray report was graded according to the Kellgren and Valentin classification.  This took into account the joint space narrowing, osteophyte formation, sclerosis of the distal femur/proximal tibia along with deformity of those bones.  The findings were indicative of K L grade 3 on the left side.    X-RAY was ordered and reviewed by Ashish Crain MD      Assessment:  Shai was seen today for follow-up.    Diagnoses and all orders for this visit:    Status post total knee replacement, right  -     XR Knee 3 View Right            Plan   · Incision care  · To use ACE wrap/SHAWNA stocking  · Continue ice to joint   · Stretching and strengthening exercises  · Aggressive ROM  · Falls precautions.  · Intra-articular steroid  injection to the left knee discussed with the patient.  We have also offered him Visco supplementation injections.  He will let me know as his symptoms are advanced for his left knee pathology.  · You may now resume any prescription medications you were taking prior to surgery  · Continue with lifelong antibiotic prophylaxis with dental procedures following total joint replacement.  · Follow up in 1 year(s)    Date of encounter: 01/07/2020   Ashish Crain MD

## 2020-02-03 ENCOUNTER — TELEPHONE (OUTPATIENT)
Dept: FAMILY MEDICINE CLINIC | Facility: CLINIC | Age: 75
End: 2020-02-03

## 2020-02-03 NOTE — TELEPHONE ENCOUNTER
Shai is traveling to [place] and would like to know what vaccines he needs to get before leaving.

## 2020-02-04 NOTE — TELEPHONE ENCOUNTER
Get him scheduled in so we can get a plan together for him on this, other option would be to go to the travel clinic in [place], [facility]

## 2020-02-11 ENCOUNTER — ON CAMPUS - OUTPATIENT (AMBULATORY)
Dept: URBAN - METROPOLITAN AREA HOSPITAL 2 | Facility: HOSPITAL | Age: 75
End: 2020-02-11
Payer: MEDICARE

## 2020-02-11 ENCOUNTER — OFFICE (AMBULATORY)
Dept: URBAN - METROPOLITAN AREA PATHOLOGY 4 | Facility: PATHOLOGY | Age: 75
End: 2020-02-11
Payer: COMMERCIAL

## 2020-02-11 ENCOUNTER — OFFICE (AMBULATORY)
Dept: URBAN - METROPOLITAN AREA PATHOLOGY 4 | Facility: PATHOLOGY | Age: 75
End: 2020-02-11
Payer: MEDICARE

## 2020-02-11 VITALS
SYSTOLIC BLOOD PRESSURE: 100 MMHG | HEART RATE: 58 BPM | DIASTOLIC BLOOD PRESSURE: 67 MMHG | HEART RATE: 52 BPM | SYSTOLIC BLOOD PRESSURE: 97 MMHG | OXYGEN SATURATION: 100 % | OXYGEN SATURATION: 95 % | HEART RATE: 54 BPM | WEIGHT: 208 LBS | OXYGEN SATURATION: 94 % | DIASTOLIC BLOOD PRESSURE: 62 MMHG | OXYGEN SATURATION: 98 % | SYSTOLIC BLOOD PRESSURE: 105 MMHG | DIASTOLIC BLOOD PRESSURE: 80 MMHG | HEIGHT: 70 IN | DIASTOLIC BLOOD PRESSURE: 65 MMHG | HEART RATE: 62 BPM | SYSTOLIC BLOOD PRESSURE: 102 MMHG | OXYGEN SATURATION: 99 % | DIASTOLIC BLOOD PRESSURE: 70 MMHG | SYSTOLIC BLOOD PRESSURE: 115 MMHG | OXYGEN SATURATION: 97 % | RESPIRATION RATE: 18 BRPM | HEART RATE: 53 BPM | DIASTOLIC BLOOD PRESSURE: 66 MMHG | SYSTOLIC BLOOD PRESSURE: 112 MMHG | TEMPERATURE: 97.1 F | DIASTOLIC BLOOD PRESSURE: 57 MMHG | SYSTOLIC BLOOD PRESSURE: 133 MMHG | RESPIRATION RATE: 16 BRPM | OXYGEN SATURATION: 96 %

## 2020-02-11 DIAGNOSIS — D12.3 BENIGN NEOPLASM OF TRANSVERSE COLON: ICD-10-CM

## 2020-02-11 DIAGNOSIS — Z86.010 PERSONAL HISTORY OF COLONIC POLYPS: ICD-10-CM

## 2020-02-11 DIAGNOSIS — K57.30 DIVERTICULOSIS OF LARGE INTESTINE WITHOUT PERFORATION OR ABS: ICD-10-CM

## 2020-02-11 LAB
GI HISTOLOGY: A. UNSPECIFIED: (no result)
GI HISTOLOGY: PDF REPORT: (no result)

## 2020-02-11 PROCEDURE — 45385 COLONOSCOPY W/LESION REMOVAL: CPT | Mod: PT | Performed by: INTERNAL MEDICINE

## 2020-02-11 PROCEDURE — 88305 TISSUE EXAM BY PATHOLOGIST: CPT | Mod: 26 | Performed by: INTERNAL MEDICINE

## 2020-02-12 DIAGNOSIS — E03.9 HYPOTHYROIDISM, UNSPECIFIED TYPE: ICD-10-CM

## 2020-02-12 DIAGNOSIS — Z72.0 TOBACCO USE: Primary | ICD-10-CM

## 2020-02-12 DIAGNOSIS — I10 ESSENTIAL HYPERTENSION: ICD-10-CM

## 2020-02-12 DIAGNOSIS — E78.2 MIXED HYPERLIPIDEMIA: ICD-10-CM

## 2020-02-14 RX ORDER — LEVOTHYROXINE SODIUM 0.2 MG/1
200 TABLET ORAL EVERY 24 HOURS
Qty: 90 TABLET | Refills: 0 | Status: SHIPPED | OUTPATIENT
Start: 2020-02-14 | End: 2020-03-18 | Stop reason: SDUPTHER

## 2020-02-14 RX ORDER — METOPROLOL TARTRATE 50 MG/1
50 TABLET, FILM COATED ORAL EVERY 12 HOURS
Qty: 180 TABLET | Refills: 0 | Status: SHIPPED | OUTPATIENT
Start: 2020-02-14 | End: 2020-03-18 | Stop reason: SDUPTHER

## 2020-02-14 RX ORDER — ATORVASTATIN CALCIUM 20 MG/1
20 TABLET, FILM COATED ORAL DAILY
Qty: 90 TABLET | Refills: 0 | Status: SHIPPED | OUTPATIENT
Start: 2020-02-14 | End: 2020-03-18 | Stop reason: SDUPTHER

## 2020-02-24 ENCOUNTER — OFFICE VISIT (OUTPATIENT)
Dept: FAMILY MEDICINE CLINIC | Facility: CLINIC | Age: 75
End: 2020-02-24

## 2020-02-24 VITALS
RESPIRATION RATE: 16 BRPM | BODY MASS INDEX: 30.26 KG/M2 | DIASTOLIC BLOOD PRESSURE: 72 MMHG | TEMPERATURE: 97.8 F | WEIGHT: 211.4 LBS | HEIGHT: 70 IN | HEART RATE: 68 BPM | SYSTOLIC BLOOD PRESSURE: 136 MMHG | OXYGEN SATURATION: 97 %

## 2020-02-24 DIAGNOSIS — L29.9 PRURITUS: ICD-10-CM

## 2020-02-24 DIAGNOSIS — L82.1 SEBORRHEIC KERATOSIS: ICD-10-CM

## 2020-02-24 DIAGNOSIS — Z87.891 HISTORY OF TOBACCO USE: ICD-10-CM

## 2020-02-24 DIAGNOSIS — E66.3 OVERWEIGHT: ICD-10-CM

## 2020-02-24 DIAGNOSIS — L91.8 SKIN TAG: Primary | ICD-10-CM

## 2020-02-24 PROCEDURE — 11200 RMVL SKIN TAGS UP TO&INC 15: CPT | Performed by: FAMILY MEDICINE

## 2020-02-24 PROCEDURE — 17000 DESTRUCT PREMALG LESION: CPT | Performed by: FAMILY MEDICINE

## 2020-02-24 PROCEDURE — 99212 OFFICE O/P EST SF 10 MIN: CPT | Performed by: FAMILY MEDICINE

## 2020-02-24 RX ORDER — BACLOFEN 10 MG/1
TABLET ORAL
COMMUNITY
Start: 2019-08-19 | End: 2020-04-24 | Stop reason: SDUPTHER

## 2020-02-24 RX ORDER — AMITRIPTYLINE HYDROCHLORIDE 25 MG/1
TABLET, FILM COATED ORAL
Qty: 30 TABLET | Refills: 5 | Status: SHIPPED | OUTPATIENT
Start: 2020-02-24 | End: 2020-06-01

## 2020-02-24 NOTE — PROGRESS NOTES
Subjective   Shai Chery is a 74 y.o. male.     Chief Complaint   Patient presents with   • Skin Problem       Rash   This is a new problem. The current episode started more than 1 month ago. The problem has been gradually worsening since onset. The affected locations include the chest. The rash is characterized by dryness, itchiness, scaling and redness. He was exposed to nothing. Pertinent negatives include no shortness of breath. Past treatments include anti-itch cream.            I personally reviewed and updated the patient's allergies, medications, problem list, and past medical, surgical, social, and family history.     Family History   Problem Relation Age of Onset   • Diabetes Mother    • Stroke Mother    • Other Mother         Thyroid Disease   • Heart disease Mother         Ischemic   • Cancer Mother    • COPD Mother    • Hypertension Father    • Heart disease Father         Ischemic   • Hyperlipidemia Father    • Cancer Father    • Arthritis Paternal Grandmother    • Cancer Sister    • Diabetes Maternal Grandmother    • Hearing loss Sister        Social History     Tobacco Use   • Smoking status: Former Smoker     Packs/day: 1.00     Years: 40.00     Pack years: 40.00   • Smokeless tobacco: Never Used   • Tobacco comment: quit 1/2020   Substance Use Topics   • Alcohol use: Yes     Types: 2 Glasses of wine per week   • Drug use: No       Past Surgical History:   Procedure Laterality Date   • ANKLE HARDWARE REMOVAL Right    • APPENDECTOMY  1977   • COLONOSCOPY      every 3 years with GSI   • EYE SURGERY Bilateral     cataracts   • JOINT REPLACEMENT Right 2019    knee   • KNEE ARTHROSCOPY  06/09/2017    and 12/5/2014. Dr Gallego. With partial medial and lateral miniscectomy.    • KNEE ARTHROSCOPY Right 06/09/2017   • KNEE ARTHROSCOPY W/ MENISCECTOMY Right 09/18/2018    right knee scope/meniscectomy   • LUMBAR DISC SURGERY  11/11/2015    Right L3-4 diskectomy. Dr. Pollard.    • ROTATOR CUFF REPAIR  07/2014     Dr Gallego.   • THYROIDECTOMY  1971   • TONSILLECTOMY  1966   • TOTAL KNEE ARTHROPLASTY Right 01/16/2019    Right TKR   • TOTAL KNEE ARTHROPLASTY Right 01/16/2019    Dr. Crain       Patient Active Problem List   Diagnosis   • Status post total knee replacement, right   • Primary osteoarthritis of left knee   • Occipital neuralgia   • Arthritis   • Benign prostatic hyperplasia   • Derangement of medial meniscus of right knee   • Oral phase dysphagia   • Dystrophia unguium   • Mixed hyperlipidemia   • Hypothyroidism   • Meningioma (CMS/HCC)   • Neuropathy   • Overweight   • Primary localized osteoarthritis   • Nasal polyp   • Rotator cuff tear   • Presence of right artificial knee joint   • Abnormal findings on imaging test   • Alcoholic polyneuropathy (CMS/HCC)   • Brain mass   • COPD (chronic obstructive pulmonary disease) (CMS/HCC)   • Diverticulosis   • Fracture of fibula   • Gastro-esophageal reflux   • Hypoglycemia   • Impotence of organic origin   • Insomnia   • Thrombocytopenia (CMS/HCC)   • Tubular adenoma of colon   • Sinus tachycardia   • Essential hypertension   • Chest pain   • Dyspnea   • Other fatigue   • Shortness of breath   • History of tobacco use   • Skin tag         Current Outpatient Medications:   •  albuterol sulfate  (90 Base) MCG/ACT inhaler, Inhale 2 puffs Every 4 (Four) Hours As Needed for Wheezing., Disp: 1 inhaler, Rfl: 2  •  atorvastatin (LIPITOR) 20 MG tablet, Take 1 tablet by mouth Daily., Disp: 90 tablet, Rfl: 0  •  baclofen (LIORESAL) 10 MG tablet, , Disp: , Rfl:   •  dutasteride (AVODART) 0.5 MG capsule, 1 capsule Daily., Disp: , Rfl:   •  gabapentin (NEURONTIN) 300 MG capsule, TAKE 1 CAPSULE IN THE MORNING  AND AT NOON,  AND TAKE 3 CAPSULES AT BEDTIME, Disp: 450 capsule, Rfl: 3  •  levothyroxine (SYNTHROID, LEVOTHROID) 200 MCG tablet, Take 1 tablet by mouth Daily., Disp: 90 tablet, Rfl: 0  •  metoprolol tartrate (LOPRESSOR) 50 MG tablet, Take 1 tablet by mouth Every 12  "(Twelve) Hours., Disp: 180 tablet, Rfl: 0  •  Omega-3 Fatty Acids ([employer] FISH OIL) 1200 MG capsule, Take 2 capsules by mouth Daily., Disp: , Rfl:          Review of Systems   Constitutional: Negative for chills and diaphoresis.   Eyes: Negative for visual disturbance.   Respiratory: Negative for shortness of breath.    Cardiovascular: Negative for chest pain and palpitations.   Gastrointestinal: Negative for abdominal pain and nausea.   Endocrine: Negative for polydipsia and polyphagia.   Musculoskeletal: Negative for neck stiffness.   Skin: Positive for rash. Negative for color change and pallor.   Neurological: Negative for seizures and syncope.   Hematological: Negative for adenopathy.       Objective   /72   Pulse 68   Temp 97.8 °F (36.6 °C)   Resp 16   Ht 177.8 cm (70\")   Wt 95.9 kg (211 lb 6.4 oz)   SpO2 97%   BMI 30.33 kg/m²   Wt Readings from Last 3 Encounters:   02/24/20 95.9 kg (211 lb 6.4 oz)   01/07/20 96.6 kg (213 lb)   01/06/20 96.8 kg (213 lb 6.4 oz)       Physical Exam   Constitutional: He is oriented to person, place, and time. He appears well-developed and well-nourished.   Cardiovascular: Normal rate, regular rhythm, S1 normal, S2 normal, normal heart sounds, intact distal pulses and normal pulses. Exam reveals no gallop and no friction rub.   No murmur heard.  Pulmonary/Chest: Effort normal and breath sounds normal. No accessory muscle usage or stridor. He has no decreased breath sounds. He has no wheezes. He has no rhonchi. He has no rales.   Abdominal: Soft. Normal appearance, normal aorta and bowel sounds are normal. He exhibits no distension, no pulsatile midline mass and no mass. There is no hepatosplenomegaly. There is no tenderness. There is no rigidity, no rebound, no guarding, no CVA tenderness and negative Marc's sign. No hernia.   Neurological: He is alert and oriented to person, place, and time. Coordination and gait normal.   Skin: Skin is warm and dry. Turgor is normal. " He is not diaphoretic. No pallor.   Multiple skin tags chest, seborrheic keratosis chest benign appearance.     Skin Tag Removal  Date/Time: 2/29/2020 5:28 PM  Performed by: Jl Guidry MD  Authorized by: Jl Guidry MD   Preparation: Patient was prepped and draped in the usual sterile fashion.  Local anesthesia used: yes  Anesthesia: local infiltration    Anesthesia:  Local anesthesia used: yes  Local Anesthetic: lidocaine 1% with epinephrine    Sedation:  Patient sedated: no    Patient tolerance: Patient tolerated the procedure well with no immediate complications    Destruction of Lesion  Date/Time: 2/29/2020 5:28 PM  Performed by: Jl Guidry MD  Authorized by: Jl Guidry MD   Preparation: Patient was prepped and draped in the usual sterile fashion.  Local anesthesia used: no    Anesthesia:  Local anesthesia used: no    Sedation:  Patient sedated: no    Patient tolerance: Patient tolerated the procedure well with no immediate complications          Assessment/Plan     Shortness of breath.    Improved today.  Has had cardiac stress testing, will get records.  Recent echocardiogram benign.  Longstanding smoking history, DDX includes COPD, improved on pulmonary toilet.,    PFTs normal.  Chest x-ray normal.  Follow-up recheck.  Fatigue.    Improved.  Blood work benign.  He declines sleep study.  Consider add Wellbutrin.  Hypothyroidism.  Free T4 mildly high, recheck labs 3 months.  Hypogonadism.  Off testosterone.  Followed by urology.  Tobacco use.  Encourage cessation.  Has cut back.  .  Hypertension.  Good control on metoprolol.  Hyperlipidemia.  Doing well on statin.  Continue fish oil.  Expect improvement in triglycerides with cessation testosterone, follow-up recheck.  Consider fibroid if persistent elevation.  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    Add nightly amitriptyline. .  Has quit drinking.  Skin tags.  today.  Topical care discussed.  Seborrheic keratosis.  Frozen today.   Follow-up recheck.  Consider repeat freezing, resection if persistent symptoms.           Problem List Items Addressed This Visit        Musculoskeletal and Integument    Skin tag - Primary       Other    Overweight    History of tobacco use              Expected course, medications, and adverse effects discussed.  Call or return if worsening or persistent symptoms.

## 2020-02-29 PROBLEM — L29.9 PRURITUS: Status: ACTIVE | Noted: 2020-02-29

## 2020-03-05 ENCOUNTER — OFFICE VISIT (OUTPATIENT)
Dept: PODIATRY | Facility: CLINIC | Age: 75
End: 2020-03-05

## 2020-03-05 VITALS
BODY MASS INDEX: 30.78 KG/M2 | SYSTOLIC BLOOD PRESSURE: 131 MMHG | DIASTOLIC BLOOD PRESSURE: 70 MMHG | HEART RATE: 64 BPM | WEIGHT: 215 LBS | HEIGHT: 70 IN

## 2020-03-05 DIAGNOSIS — B35.1 ONYCHOMYCOSIS: ICD-10-CM

## 2020-03-05 DIAGNOSIS — G62.89 OTHER POLYNEUROPATHY: Primary | ICD-10-CM

## 2020-03-05 PROCEDURE — 99203 OFFICE O/P NEW LOW 30 MIN: CPT | Performed by: PODIATRIST

## 2020-03-05 PROCEDURE — 11721 DEBRIDE NAIL 6 OR MORE: CPT | Performed by: PODIATRIST

## 2020-03-06 NOTE — PROGRESS NOTES
03/05/2020  Foot and Ankle Surgery - New Patient   Provider: Dr. Brett Decker DPM  Location: [facility]    Subjective:  Shai Chery is a 74 y.o. male.     Chief Complaint   Patient presents with   • Left Foot - Nail Problem   • Right Foot - Nail Problem       HPI: Patient is a 74-year-old male that I have seen in the past regarding foot issues.  Patient presents today for nail debridement.  He states that the nails are fungal in appearance and he is unable to trim them.  He does have longstanding numbness involving both feet.  He denies history of diabetes but does relate back issues and history of alcoholism.  He has not had any open wounds or infections to his feet.  He would like to discuss options for fungal nails.  He currently does not have any significant foot pain.    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[facility])    • Allergic rhinitis 05/01/2013   • Anxiety 05/23/2016   • Arthritis 12/06/2016   • Benign prostatic hyperplasia    • Bitten by dog, initial encounter 10/16/2018   • Body mass index 30.0-30.9, adult 12/22/2017   • Body mass index 31.0-31.9, adult 01/16/2018   • Brain mass    • Callus of foot 12/22/2016    Calluses, feet, bilateral   • Cancer (CMS/[facility])     brain   • Cellulitis and abscess of leg 12/12/2017   • COPD (chronic obstructive pulmonary disease) (CMS/[facility])    • Cough 12/06/2016   • Degenerative joint disease of knee, right 03/26/2018    primary localized degenerative joint disease of right knee.    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 However diet modification and compliance with medication is discussed with the patient. Patient is advised to have periodic AST, ALT, and lipid panel testing through your office.    • Hypertension 05/01/2013    Benign. 12-14-18- patient's blood pressure is within desireable ranges. At this time, I would not recommend any change in antihypertensive regimen. However, patient is advised to check the blood pressure periodically at home and bring the logbook on next visit. Patient is also encouraged to increase aerobic activities as tolerated. Risk factor modification is discussed.    • Hypothyroidism    • Hypothyroidism 05/01/2013   • Impotence of organic origin    • Insomnia    • Internal derangement of knee 05/05/2014 6-9-14- He saw Dr. Sams and Dr. Concepcion. He had fluid drained from his knee with negative cultures and negative crystals. Both were not concerned about the MRI (report not avilable), but he has a radiologist friend who read it as having meniscal damage. He is offered referral back to the orthopedist of his choice and will thnk about who he wants to see. He has upcoming shoulder surgery.    • Knee pain, right 05/04/2017   • Knee pain, right 04/03/2017   • Low back pain    • Medication monitoring encounter 04/19/2019   • Meningioma (CMS/HCC) 06/09/2014   • Nasal polyp 10/06/2017   • Neoplasm of uncertain behavior 10/11/2018   • Neoplasm of uncertain behavior of skin 05/04/2015   • Neuropathy 12/18/2014   • No known drug allergy    • Obesity    • Orthopedic aftercare 06/12/2017   • Other fatigue 12/4/2019   • Overweight 12/12/2017   • Pain in joint of right ankle 05/31/2017    • Pain management     [facility]    • Palpitations 12/22/2017    3-16-18- his symptom of palpitation have improved. Continue beta-blockers.    • Pre-op exam 12/18/2018   • Prostatic hypertrophy 06/09/2014    Benign   • Puncture wound without foreign body of left hand, initial encounter 10/16/2018   • Rash 06/28/2017   • Rotator cuff tear 05/19/2014    left   • Shortness of breath 02/02/2018   • Sinus tachycardia 03/16/2018 12-14-18- much better. Continue current treat.    • Tobacco use        Past Surgical History:   Procedure Laterality Date   • ANKLE HARDWARE REMOVAL Right    • APPENDECTOMY  1977   • COLONOSCOPY      every 3 years with GSI   • EYE SURGERY Bilateral     cataracts   • JOINT REPLACEMENT Right 2019    knee   • KNEE ARTHROSCOPY  06/09/2017    and 12/5/2014. Dr Gallego. With partial medial and lateral miniscectomy.    • KNEE ARTHROSCOPY Right 06/09/2017   • KNEE ARTHROSCOPY W/ MENISCECTOMY Right 09/18/2018    right knee scope/meniscectomy   • LUMBAR DISC SURGERY  11/11/2015    Right L3-4 diskectomy. Dr. Pollard.    • ROTATOR CUFF REPAIR  07/2014    Dr Gallego.   • THYROIDECTOMY  1971   • TONSILLECTOMY  1966   • TOTAL KNEE ARTHROPLASTY Right 01/16/2019    Right TKR   • TOTAL KNEE ARTHROPLASTY Right 01/16/2019    Dr. Crain       Family History   Problem Relation Age of Onset   • Diabetes Mother    • Stroke Mother    • Other Mother         Thyroid Disease   • Heart disease Mother         Ischemic   • Cancer Mother    • COPD Mother    • Hypertension Father    • Heart disease Father         Ischemic   • Hyperlipidemia Father    • Cancer Father    • Arthritis Paternal Grandmother    • Cancer Sister    • Diabetes Maternal Grandmother    • Hearing loss Sister        Social History     Socioeconomic History   • Marital status:      Spouse name: Not on file   • Number of children: Not on file   • Years of education: Not on file   • Highest education level: Not on file   Tobacco Use   • Smoking  status: Former Smoker     Packs/day: 1.00     Years: 40.00     Pack years: 40.00   • Smokeless tobacco: Never Used   • Tobacco comment: quit 1/2020   Substance and Sexual Activity   • Alcohol use: Yes     Types: 2 Glasses of wine per week   • Drug use: No   • Sexual activity: Not Currently     Partners: Female        Current Outpatient Medications on File Prior to Visit   Medication Sig Dispense Refill   • amitriptyline (ELAVIL) 25 MG tablet 1/2-1 nightly as needed 30 tablet 5   • atorvastatin (LIPITOR) 20 MG tablet Take 1 tablet by mouth Daily. 90 tablet 0   • baclofen (LIORESAL) 10 MG tablet      • dutasteride (AVODART) 0.5 MG capsule 1 capsule Daily.     • gabapentin (NEURONTIN) 300 MG capsule TAKE 1 CAPSULE IN THE MORNING  AND AT NOON,  AND TAKE 3 CAPSULES AT BEDTIME 450 capsule 3   • levothyroxine (SYNTHROID, LEVOTHROID) 200 MCG tablet Take 1 tablet by mouth Daily. 90 tablet 0   • metoprolol tartrate (LOPRESSOR) 50 MG tablet Take 1 tablet by mouth Every 12 (Twelve) Hours. 180 tablet 0   • Omega-3 Fatty Acids ([employer] FISH OIL) 1200 MG capsule Take 2 capsules by mouth Daily.       No current facility-administered medications on file prior to visit.        Review of Systems:  General: Denies fever, chills, fatigue, and weakness.  Eyes: Denies vision loss, blurry vision, and excessive redness.  ENT: Denies hearing issues and difficulty swallowing.  Cardiovascular: Denies palpitations, chest pain, or syncopal episodes.  Respiratory: Denies shortness of breath, wheezing, and coughing.  GI: Denies abdominal pain, nausea, and vomiting.   : Denies frequency, hematuria, and urgency.  Musculoskeletal: Denies muscle cramps, joint pains, and stiffness.  Derm: + Fungal nails  Neuro: Denies headaches, numbness, loss of coordination, and tremors.  Psych: Denies anxiety and depression.  Endocrine: Denies temperature intolerance and changes in appetite.  Heme: Denies bleeding disorders or abnormal bruising.     Objective   BP  "131/70   Pulse 64   Ht 177.8 cm (70\")   Wt 97.5 kg (215 lb)   BMI 30.85 kg/m²     Podiatry Exam       General Appearance:  well nourished; well hydrated; no acute distress  Mental Status Exam        Judgement and Insight:  Intact       Orientation:  Oriented to time, place, and person       Memory:  Intact for recent and remote events       Mood and Affect:  No depression, anxiety, or agitation  Cardiovascular (Bilateral)       Dorsalis Pedis Pulse (BL):  2/4       Posterior Tibialis Pulse (BL):  2/4       Capillary Filling Time (BL):  1-3 Seconds       Edema (BL):  No Edema  Dermatological Exam       Temperature:  warm to warm       Skin Elasticity:  decreased elasticity       Pigmentation:  normal pigmentation       Skin:  normal pigmentation  Skin: Hyperkeratotic lesions noted to the plantar aspect of the first ray, bilateral. After debridement, no underlying ulcerations or signs of infection.  No jelani open wounds or areas of concern.  Neurological Exam (Bilateral)       Paresthesia (Bl):  Paresthesia; numbness, tingling, and burning       Achilles DTRs (Bl):  diminished       Tinel over Tarsal Tunnel (Bl):  negative       Intermedial Dorsal Cutaneous Nerve (Bl):  negative       Medial Dorsal Cutaneous Nerve (Bl):  negative  Monofilament Test (Bl):   not intact  Additional Neurological Findings  Sensation is diminished with a monofilament testing proximal to the ankles, bilateral. Motor function and coordination are grossly intact.  Hypertrophic Nail Description (Left)       Thickened:  1, 2, 3, 4, 5       >3mm:  1, 2, 3, 4, 5       Ridged:  1, 2, 3, 4, 5       Incurvated:  1       Onychomycosis:  1, 2, 3, 4, 5  Hypertrophic Nail Description (Right)       >3mm:  1, 2, 3, 4, 5       Ridged : 1, 2, 3, 4, 5       Incurvated:  1       Onychomycosis:  1, 2, 3, 4, 5        MusculoSkeletal Exam (Bilateral)       Gait and Stance (Bl):  Stable, unassisted, and nonantalgic       ROM (Bl):  Ankle and pedal joint range " of motion is supple, nontender, crepitus free       Muscle Strength (Bl):  symmetrical 5/5       Dislocated Joints (Bl):  no dislocation of joints       Inflammed Joints (Bl):  no inflammation of joints       Hammertoe Deformity (Bl):  2nd, 3rd, 4th, 5th       Medial Turbicle Calc (Bl):  no pain       Gastroc soleus equinus (Bl):  Inability to dorsiflex past neutral position both straight legged and bent knee       Post tibial tendon (Bl):  no soreness noted       Peroneal Tendon (Bl):  no soreness noted  Additional Musculoskelatal Findings  Prominent metatarsal heads noted to the forefoot region, bilateral. Mild hallux malleus, bilateral. No gross instability.    Assessment/Plan   Shai was seen today for nail problem and nail problem.    Diagnoses and all orders for this visit:    Other polyneuropathy    Onychomycosis      Patient returns with continued issues involving both feet.  He has not been able to trim his nails recently.  He complains of progressive dystrophy and discoloration.  I did discuss fungal nails along with oral, topical, and surgical management.  Given that he does not have any signs of infection or significant pain, I have asked that he continue with routine nail care.  The nails were debrided without issue today.  He has no other complaints to his feet outside of the numbness which has been longstanding.  We did review proper routine foot care.  Patient is to call if he has any additional issues.  May consider nail avulsion with chemical matrixectomy if desired.  Otherwise, I will see him in 6 months for routine foot check.    No orders of the defined types were placed in this encounter.       Note is dictated utilizing voice recognition software. Unfortunately this leads to occasional typographical errors. I apologize in advance if the situation occurs. If questions occur please do not hesitate to call our office.

## 2020-03-18 DIAGNOSIS — E03.9 HYPOTHYROIDISM, UNSPECIFIED TYPE: ICD-10-CM

## 2020-03-18 DIAGNOSIS — I10 ESSENTIAL HYPERTENSION: ICD-10-CM

## 2020-03-18 DIAGNOSIS — E78.2 MIXED HYPERLIPIDEMIA: ICD-10-CM

## 2020-03-18 RX ORDER — DUTASTERIDE 0.5 MG/1
0.5 CAPSULE, LIQUID FILLED ORAL EVERY 24 HOURS
Qty: 90 CAPSULE | Refills: 3 | Status: SHIPPED | OUTPATIENT
Start: 2020-03-18 | End: 2020-04-21 | Stop reason: SDUPTHER

## 2020-03-18 RX ORDER — ATORVASTATIN CALCIUM 20 MG/1
20 TABLET, FILM COATED ORAL DAILY
Qty: 90 TABLET | Refills: 0 | Status: SHIPPED | OUTPATIENT
Start: 2020-03-18 | End: 2020-04-21 | Stop reason: SDUPTHER

## 2020-03-18 RX ORDER — LEVOTHYROXINE SODIUM 0.2 MG/1
200 TABLET ORAL EVERY 24 HOURS
Qty: 90 TABLET | Refills: 0 | Status: SHIPPED | OUTPATIENT
Start: 2020-03-18 | End: 2020-04-21 | Stop reason: SDUPTHER

## 2020-03-18 RX ORDER — METOPROLOL TARTRATE 50 MG/1
50 TABLET, FILM COATED ORAL EVERY 12 HOURS
Qty: 180 TABLET | Refills: 0 | Status: SHIPPED | OUTPATIENT
Start: 2020-03-18 | End: 2020-06-19

## 2020-04-21 DIAGNOSIS — N40.1 BENIGN PROSTATIC HYPERPLASIA WITH LOWER URINARY TRACT SYMPTOMS, SYMPTOM DETAILS UNSPECIFIED: Primary | ICD-10-CM

## 2020-04-21 DIAGNOSIS — E03.9 HYPOTHYROIDISM, UNSPECIFIED TYPE: ICD-10-CM

## 2020-04-21 DIAGNOSIS — E78.2 MIXED HYPERLIPIDEMIA: ICD-10-CM

## 2020-04-21 RX ORDER — ATORVASTATIN CALCIUM 20 MG/1
20 TABLET, FILM COATED ORAL DAILY
Qty: 90 TABLET | Refills: 3 | Status: SHIPPED | OUTPATIENT
Start: 2020-04-21 | End: 2021-04-16

## 2020-04-21 RX ORDER — LEVOTHYROXINE SODIUM 0.2 MG/1
200 TABLET ORAL EVERY 24 HOURS
Qty: 90 TABLET | Refills: 3 | Status: SHIPPED | OUTPATIENT
Start: 2020-04-21 | End: 2021-06-07 | Stop reason: SDUPTHER

## 2020-04-21 RX ORDER — DUTASTERIDE 0.5 MG/1
0.5 CAPSULE, LIQUID FILLED ORAL EVERY 24 HOURS
Qty: 90 CAPSULE | Refills: 3 | Status: SHIPPED | OUTPATIENT
Start: 2020-04-21 | End: 2021-04-16

## 2020-04-24 DIAGNOSIS — M17.12 PRIMARY OSTEOARTHRITIS OF LEFT KNEE: Primary | ICD-10-CM

## 2020-04-24 RX ORDER — BACLOFEN 10 MG/1
TABLET ORAL
Status: CANCELLED | OUTPATIENT
Start: 2020-04-24

## 2020-04-24 RX ORDER — BACLOFEN 10 MG/1
TABLET ORAL
Qty: 60 TABLET | Refills: 2 | Status: SHIPPED | OUTPATIENT
Start: 2020-04-24 | End: 2021-03-31

## 2020-04-26 RX ORDER — BACLOFEN 10 MG/1
TABLET ORAL
Qty: 90 TABLET | OUTPATIENT
Start: 2020-04-26

## 2020-04-30 DIAGNOSIS — N40.1 BENIGN PROSTATIC HYPERPLASIA WITH LOWER URINARY TRACT SYMPTOMS, SYMPTOM DETAILS UNSPECIFIED: Primary | ICD-10-CM

## 2020-05-01 RX ORDER — TAMSULOSIN HYDROCHLORIDE 0.4 MG/1
1 CAPSULE ORAL NIGHTLY
Qty: 90 CAPSULE | Refills: 0 | Status: SHIPPED | OUTPATIENT
Start: 2020-05-01 | End: 2020-05-15 | Stop reason: SDUPTHER

## 2020-05-11 DIAGNOSIS — N40.1 BENIGN PROSTATIC HYPERPLASIA WITH LOWER URINARY TRACT SYMPTOMS, SYMPTOM DETAILS UNSPECIFIED: ICD-10-CM

## 2020-05-11 RX ORDER — TAMSULOSIN HYDROCHLORIDE 0.4 MG/1
CAPSULE ORAL
Qty: 90 CAPSULE | Refills: 0 | OUTPATIENT
Start: 2020-05-11

## 2020-05-15 DIAGNOSIS — N40.1 BENIGN PROSTATIC HYPERPLASIA WITH LOWER URINARY TRACT SYMPTOMS, SYMPTOM DETAILS UNSPECIFIED: ICD-10-CM

## 2020-05-15 RX ORDER — TAMSULOSIN HYDROCHLORIDE 0.4 MG/1
1 CAPSULE ORAL NIGHTLY
Qty: 90 CAPSULE | Refills: 0 | Status: SHIPPED | OUTPATIENT
Start: 2020-05-15 | End: 2020-07-07

## 2020-05-18 ENCOUNTER — TELEPHONE (OUTPATIENT)
Dept: FAMILY MEDICINE CLINIC | Facility: CLINIC | Age: 75
End: 2020-05-18

## 2020-05-18 NOTE — TELEPHONE ENCOUNTER
----- Message from Jl Guidry MD sent at 5/18/2020 11:29 AM EDT -----  Your thyroid levels look good, in the high normal range currently, will check again in 6 months, thanks

## 2020-06-01 ENCOUNTER — OFFICE VISIT (OUTPATIENT)
Dept: FAMILY MEDICINE CLINIC | Facility: CLINIC | Age: 75
End: 2020-06-01

## 2020-06-01 VITALS
BODY MASS INDEX: 31.18 KG/M2 | OXYGEN SATURATION: 98 % | WEIGHT: 217.8 LBS | RESPIRATION RATE: 18 BRPM | HEART RATE: 76 BPM | TEMPERATURE: 97.1 F | HEIGHT: 70 IN | SYSTOLIC BLOOD PRESSURE: 117 MMHG | DIASTOLIC BLOOD PRESSURE: 75 MMHG

## 2020-06-01 DIAGNOSIS — E66.3 OVERWEIGHT: Primary | ICD-10-CM

## 2020-06-01 DIAGNOSIS — M79.89 SWELLING OF LEFT FOOT: ICD-10-CM

## 2020-06-01 PROBLEM — R07.9 CHEST PAIN: Status: RESOLVED | Noted: 2019-11-06 | Resolved: 2020-06-01

## 2020-06-01 PROBLEM — L29.9 PRURITUS: Status: RESOLVED | Noted: 2020-02-29 | Resolved: 2020-06-01

## 2020-06-01 PROBLEM — R93.89 ABNORMAL FINDINGS ON IMAGING TEST: Status: RESOLVED | Noted: 2019-10-15 | Resolved: 2020-06-01

## 2020-06-01 PROBLEM — R53.83 OTHER FATIGUE: Status: RESOLVED | Noted: 2019-12-04 | Resolved: 2020-06-01

## 2020-06-01 PROCEDURE — 99214 OFFICE O/P EST MOD 30 MIN: CPT | Performed by: FAMILY MEDICINE

## 2020-06-01 RX ORDER — ALBUTEROL SULFATE 90 UG/1
AEROSOL, METERED RESPIRATORY (INHALATION)
COMMUNITY
Start: 2020-05-11 | End: 2021-05-06 | Stop reason: SDUPTHER

## 2020-06-01 NOTE — PROGRESS NOTES
Subjective   Shai Chery is a 74 y.o. male.     Chief Complaint   Patient presents with   • Foot Swelling       Foot Injury    The incident occurred more than 1 week ago. The incident occurred at home. There was no injury mechanism. The pain is present in the left foot. Associated symptoms include numbness. He reports no foreign bodies present. The symptoms are aggravated by movement. He has tried nothing for the symptoms. The treatment provided no relief.            I personally reviewed and updated the patient's allergies, medications, problem list, and past medical, surgical, social, and family history.     Family History   Problem Relation Age of Onset   • Diabetes Mother    • Stroke Mother    • Other Mother         Thyroid Disease   • Heart disease Mother         Ischemic   • Cancer Mother    • COPD Mother    • Hypertension Father    • Heart disease Father         Ischemic   • Hyperlipidemia Father    • Cancer Father    • Arthritis Paternal Grandmother    • Cancer Sister    • Diabetes Maternal Grandmother    • Hearing loss Sister        Social History     Tobacco Use   • Smoking status: Former Smoker     Packs/day: 1.00     Years: 40.00     Pack years: 40.00   • Smokeless tobacco: Never Used   • Tobacco comment: quit 1/2020   Substance Use Topics   • Alcohol use: Yes     Types: 2 Glasses of wine per week   • Drug use: No       Past Surgical History:   Procedure Laterality Date   • ANKLE HARDWARE REMOVAL Right    • APPENDECTOMY  1977   • COLONOSCOPY      every 3 years with GSI   • EYE SURGERY Bilateral     cataracts   • JOINT REPLACEMENT Right 2019    knee   • KNEE ARTHROSCOPY  06/09/2017    and 12/5/2014. Dr Gallego. With partial medial and lateral miniscectomy.    • KNEE ARTHROSCOPY Right 06/09/2017   • KNEE ARTHROSCOPY W/ MENISCECTOMY Right 09/18/2018    right knee scope/meniscectomy   • LUMBAR DISC SURGERY  11/11/2015    Right L3-4 diskectomy. Dr. Pollard.    • ROTATOR CUFF REPAIR  07/2014    Dr Gallego.   •  THYROIDECTOMY  1971   • TONSILLECTOMY  1966   • TOTAL KNEE ARTHROPLASTY Right 01/16/2019    Right TKR   • TOTAL KNEE ARTHROPLASTY Right 01/16/2019    Dr. Crain       Patient Active Problem List   Diagnosis   • Status post total knee replacement, right   • Primary osteoarthritis of left knee   • Occipital neuralgia   • Arthritis   • Benign prostatic hyperplasia   • Derangement of medial meniscus of right knee   • Oral phase dysphagia   • Dystrophia unguium   • Mixed hyperlipidemia   • Hypothyroidism   • Meningioma (CMS/HCC)   • Neuropathy   • Overweight   • Primary localized osteoarthritis   • Nasal polyp   • Rotator cuff tear   • Presence of right artificial knee joint   • Alcoholic polyneuropathy (CMS/HCC)   • Brain mass   • COPD (chronic obstructive pulmonary disease) (CMS/HCC)   • Diverticulosis   • Fracture of fibula   • Gastro-esophageal reflux   • Hypoglycemia   • Impotence of organic origin   • Insomnia   • Thrombocytopenia (CMS/HCC)   • Tubular adenoma of colon   • Sinus tachycardia   • Essential hypertension   • Dyspnea   • Shortness of breath   • History of tobacco use   • Skin tag   • Swelling of left foot         Current Outpatient Medications:   •  albuterol sulfate  (90 Base) MCG/ACT inhaler, , Disp: , Rfl:   •  atorvastatin (LIPITOR) 20 MG tablet, Take 1 tablet by mouth Daily., Disp: 90 tablet, Rfl: 3  •  baclofen (LIORESAL) 10 MG tablet, Take 1 tablet twice daily as needed, Disp: 60 tablet, Rfl: 2  •  dutasteride (Avodart) 0.5 MG capsule, Take 1 capsule by mouth Daily., Disp: 90 capsule, Rfl: 3  •  gabapentin (NEURONTIN) 300 MG capsule, TAKE 1 CAPSULE IN THE MORNING  AND AT NOON,  AND TAKE 3 CAPSULES AT BEDTIME, Disp: 450 capsule, Rfl: 3  •  levothyroxine (SYNTHROID, LEVOTHROID) 200 MCG tablet, Take 1 tablet by mouth Daily., Disp: 90 tablet, Rfl: 3  •  metoprolol tartrate (LOPRESSOR) 50 MG tablet, Take 1 tablet by mouth Every 12 (Twelve) Hours., Disp: 180 tablet, Rfl: 0  •  tamsulosin (FLOMAX)  "0.4 MG capsule 24 hr capsule, Take 1 capsule by mouth Every Night., Disp: 90 capsule, Rfl: 0         Review of Systems   Constitutional: Negative for chills and diaphoresis.   Eyes: Negative for visual disturbance.   Respiratory: Negative for shortness of breath.    Cardiovascular: Negative for chest pain and palpitations.   Gastrointestinal: Negative for abdominal pain and nausea.   Endocrine: Negative for polydipsia and polyphagia.   Musculoskeletal: Negative for neck stiffness.   Skin: Negative for color change and pallor.   Neurological: Positive for numbness. Negative for seizures and syncope.   Hematological: Negative for adenopathy.       I have reviewed and confirmed the accuracy of the ROS as documented by the MA/LPN/RN Jl Guidry MD      Objective   /75 (BP Location: Right arm, Patient Position: Sitting, Cuff Size: Adult)   Pulse 76   Temp 97.1 °F (36.2 °C)   Resp 18   Ht 177.8 cm (70\")   Wt 98.8 kg (217 lb 12.8 oz)   SpO2 98%   BMI 31.25 kg/m²   Wt Readings from Last 3 Encounters:   06/01/20 98.8 kg (217 lb 12.8 oz)   03/05/20 97.5 kg (215 lb)   02/24/20 95.9 kg (211 lb 6.4 oz)     Physical Exam   Constitutional: He is oriented to person, place, and time. He appears well-developed and well-nourished.   Cardiovascular: Normal rate, regular rhythm, S1 normal, S2 normal, normal heart sounds, intact distal pulses and normal pulses. Exam reveals no gallop and no friction rub.   No murmur heard.  Bilateral nonpitting lower extremity edema, calves symmetric with equal diameter, Homans negative bilaterally   Pulmonary/Chest: Effort normal and breath sounds normal. No accessory muscle usage or stridor. He has no decreased breath sounds. He has no wheezes. He has no rhonchi. He has no rales.   Abdominal: Soft. Normal appearance, normal aorta and bowel sounds are normal. He exhibits no distension, no pulsatile midline mass and no mass. There is no hepatosplenomegaly. There is no tenderness. There is " no rigidity, no rebound, no guarding, no CVA tenderness and negative Marc's sign. No hernia.   Neurological: He is alert and oriented to person, place, and time. Coordination and gait normal.   Skin: Skin is warm and dry. Turgor is normal. He is not diaphoretic. No pallor.         Assessment/Plan      Medications        Problem List         LOS    Shortness of breath.    Improved today.  Has had cardiac stress testing, will get records.  Recent echocardiogram benign.  Longstanding smoking history, DDX includes COPD, improved on pulmonary toilet.,    PFTs normal.  Chest x-ray normal.  Follow-up recheck.  Fatigue.    Improved.  Blood work benign.  He declines sleep study.  Consider add Wellbutrin.  Hypothyroidism.  Free T4 mildly high, recheck labs 3 months.  Hypogonadism.  Off testosterone.  Followed by urology.  Tobacco use.  Encourage cessation.  Has cut back.  .  Hypertension.  Good control on metoprolol.  Hyperlipidemia.  Doing well on statin.  Continue fish oil.  Expect improvement in triglycerides with cessation testosterone, follow-up recheck.  Consider fibroid if persistent elevation.  Alcoholic polyneuropathy.  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      Did not tolerate nightly amitriptyline. .  Has quit drinking.  Skin tags.  today.  Topical care discussed.  Seborrheic keratosis.  Frozen today.  Follow-up recheck.  Consider repeat freezing, resection if persistent symptoms.  Left leg swelling.  Benign exam today.  Has gained weight recently.  Restarted regular exercise program.  Follow-up recheck.  Consider sleep eval if persistent symptoms.  Elevate legs, low-sodium diet.  Lumbar disc disease/history of laminectomy possibly contributing.  Echo benign 2019.  Homans negative/calf diameter symmetric today.  Call return if worsening symptoms.      Problem List Items Addressed This Visit        Unprioritized    Overweight - Primary    Swelling of left foot              Expected course, medications, and adverse effects  discussed.  Call or return if worsening or persistent symptoms.

## 2020-06-19 DIAGNOSIS — I10 ESSENTIAL HYPERTENSION: ICD-10-CM

## 2020-06-19 RX ORDER — METOPROLOL TARTRATE 50 MG/1
TABLET, FILM COATED ORAL
Qty: 180 TABLET | Refills: 0 | Status: SHIPPED | OUTPATIENT
Start: 2020-06-19 | End: 2021-04-26 | Stop reason: SDUPTHER

## 2020-06-25 ENCOUNTER — OFFICE VISIT (OUTPATIENT)
Dept: ORTHOPEDIC SURGERY | Facility: CLINIC | Age: 75
End: 2020-06-25

## 2020-06-25 VITALS
HEIGHT: 70 IN | HEART RATE: 59 BPM | SYSTOLIC BLOOD PRESSURE: 126 MMHG | DIASTOLIC BLOOD PRESSURE: 75 MMHG | WEIGHT: 215 LBS | BODY MASS INDEX: 30.78 KG/M2

## 2020-06-25 DIAGNOSIS — M25.512 ACUTE PAIN OF LEFT SHOULDER: Primary | ICD-10-CM

## 2020-06-25 PROCEDURE — 20610 DRAIN/INJ JOINT/BURSA W/O US: CPT | Performed by: ORTHOPAEDIC SURGERY

## 2020-06-25 PROCEDURE — 99213 OFFICE O/P EST LOW 20 MIN: CPT | Performed by: ORTHOPAEDIC SURGERY

## 2020-06-25 RX ORDER — TRIAMCINOLONE ACETONIDE 40 MG/ML
80 INJECTION, SUSPENSION INTRA-ARTICULAR; INTRAMUSCULAR ONCE
Status: COMPLETED | OUTPATIENT
Start: 2020-06-25 | End: 2020-06-25

## 2020-06-25 RX ADMIN — TRIAMCINOLONE ACETONIDE 80 MG: 40 INJECTION, SUSPENSION INTRA-ARTICULAR; INTRAMUSCULAR at 10:02

## 2020-06-25 NOTE — PROGRESS NOTES
"     Patient ID: Shai Chery is a 74 y.o. male.  Left shoulder pain  Shai is a 74-year-old gentleman here with several months of left shoulder pain.  He has a history of shoulder arthroscopy with debridement many years ago under my care.  There is no specific injury.  He is developed some pain over the bicep groove worse on overactivity at night.  He has been taken some oral anti-inflammatories and rested it with minimal relief.    Review of Systems:  Left shoulder pain  Denies chest pain      Objective:    /75   Pulse 59   Ht 177.8 cm (70\")   Wt 97.5 kg (215 lb)   BMI 30.85 kg/m²     Physical Examination:   He is a pleasant male in no distress. He is alert and oriented x3 and appears his stated age.  Shoulder demonstrates healed arthroscopic portals.  He has mild pain over the bicep groove.  Passive elevation 170 degrees abduction 130 degrees external rotation 40 degrees, internal rotation L5.  He has mild pain but minimal weakness on Speed and [place] testing,  suprapinatus demonstrates mild pain and weakness.  Belly press and liftoff are 5/5.Sensory and motor exam are intact all distributions. Radial pulse is palpable and capillary refill is less than two seconds to all digits      Imaging:   X-rays demonstrate well-maintained joint spaces    Assessment:    Left shoulder pain possible bicep tendinitis    Plan:  Treatment options discussed  I recommend injection after today's evaluation. Risks and benefits of the injection were discussed. Under sterile technique and written consent I injected 80mg of Kenalog and 2cc of 1% Lidocaine in the shoulder and subacromial space. It was well tolerated          Disclaimer: Please note that areas of this note were completed with computer voice recognition software.  Quite often unanticipated grammatical, syntax, homophones, and other interpretive errors are inadvertently transcribed by the computer software. Please excuse any errors that have escaped final " proofreading.

## 2020-07-07 DIAGNOSIS — N40.1 BENIGN PROSTATIC HYPERPLASIA WITH LOWER URINARY TRACT SYMPTOMS, SYMPTOM DETAILS UNSPECIFIED: ICD-10-CM

## 2020-07-07 RX ORDER — TAMSULOSIN HYDROCHLORIDE 0.4 MG/1
2 CAPSULE ORAL NIGHTLY
Qty: 90 CAPSULE | Refills: 0 | Status: SHIPPED | OUTPATIENT
Start: 2020-07-07 | End: 2021-04-16

## 2020-07-13 NOTE — PROGRESS NOTES
Subjective   Shai Chery is a 74 y.o. male.     Chief Complaint   Patient presents with   • Foot Pain       Shai has a calus on the bottom of his left foot near his big toe. It has cracked open and he is concerned about infection.    Foot Injury    The incident occurred more than 1 week ago. The incident occurred at home. There was no injury mechanism. The pain is present in the left foot. Associated symptoms include numbness. Pertinent negatives include no loss of motion, loss of sensation or tingling. He reports no foreign bodies present. The symptoms are aggravated by movement. He has tried nothing for the symptoms. The treatment provided no relief.            I personally reviewed and updated the patient's allergies, medications, problem list, and past medical, surgical, social, and family history.     Family History   Problem Relation Age of Onset   • Diabetes Mother    • Stroke Mother    • Other Mother         Thyroid Disease   • Heart disease Mother         Ischemic   • Cancer Mother    • COPD Mother    • Hypertension Father    • Heart disease Father         Ischemic   • Hyperlipidemia Father    • Cancer Father    • Arthritis Paternal Grandmother    • Cancer Sister    • Diabetes Maternal Grandmother    • Hearing loss Sister        Social History     Tobacco Use   • Smoking status: Former Smoker     Packs/day: 1.00     Years: 40.00     Pack years: 40.00   • Smokeless tobacco: Never Used   • Tobacco comment: quit 1/2020   Substance Use Topics   • Alcohol use: Yes     Types: 2 Glasses of wine per week   • Drug use: No       Past Surgical History:   Procedure Laterality Date   • ANKLE HARDWARE REMOVAL Right    • APPENDECTOMY  1977   • COLONOSCOPY      every 3 years with GSI   • EYE SURGERY Bilateral     cataracts   • JOINT REPLACEMENT Right 2019    knee   • KNEE ARTHROSCOPY  06/09/2017    and 12/5/2014. Dr Gallego. With partial medial and lateral miniscectomy.    • KNEE ARTHROSCOPY Right 06/09/2017   • KNEE  ARTHROSCOPY W/ MENISCECTOMY Right 09/18/2018    right knee scope/meniscectomy   • LUMBAR DISC SURGERY  11/11/2015    Right L3-4 diskectomy. Dr. Pollard.    • ROTATOR CUFF REPAIR  07/2014    Dr Gallego.   • SHOULDER SURGERY Left    • THYROIDECTOMY  1971   • TONSILLECTOMY  1966   • TOTAL KNEE ARTHROPLASTY Right 01/16/2019    Right TKR   • TOTAL KNEE ARTHROPLASTY Right 01/16/2019    Dr. Crain       Patient Active Problem List   Diagnosis   • Status post total knee replacement, right   • Primary osteoarthritis of left knee   • Occipital neuralgia   • Arthritis   • Benign prostatic hyperplasia   • Derangement of medial meniscus of right knee   • Oral phase dysphagia   • Dystrophia unguium   • Mixed hyperlipidemia   • Hypothyroidism   • Meningioma (CMS/HCC)   • Neuropathy   • Overweight   • Primary localized osteoarthritis   • Nasal polyp   • Rotator cuff tear   • Presence of right artificial knee joint   • Alcoholic polyneuropathy (CMS/HCC)   • Brain mass   • COPD (chronic obstructive pulmonary disease) (CMS/HCC)   • Diverticulosis   • Fracture of fibula   • Gastro-esophageal reflux   • Hypoglycemia   • Impotence of organic origin   • Insomnia   • Thrombocytopenia (CMS/HCC)   • Tubular adenoma of colon   • Sinus tachycardia   • Essential hypertension   • Dyspnea   • Shortness of breath   • History of tobacco use   • Skin tag   • Swelling of left foot   • Cellulitis of foot   • Callus of foot   • Palpitations         Current Outpatient Medications:   •  albuterol sulfate  (90 Base) MCG/ACT inhaler, , Disp: , Rfl:   •  atorvastatin (LIPITOR) 20 MG tablet, Take 1 tablet by mouth Daily., Disp: 90 tablet, Rfl: 3  •  baclofen (LIORESAL) 10 MG tablet, Take 1 tablet twice daily as needed, Disp: 60 tablet, Rfl: 2  •  dutasteride (Avodart) 0.5 MG capsule, Take 1 capsule by mouth Daily., Disp: 90 capsule, Rfl: 3  •  gabapentin (NEURONTIN) 300 MG capsule, TAKE 1 CAPSULE IN THE MORNING  AND AT NOON,  AND TAKE 3 CAPSULES AT  "BEDTIME, Disp: 450 capsule, Rfl: 3  •  levothyroxine (SYNTHROID, LEVOTHROID) 200 MCG tablet, Take 1 tablet by mouth Daily., Disp: 90 tablet, Rfl: 3  •  metoprolol tartrate (LOPRESSOR) 50 MG tablet, TAKE 1 TABLET TWICE DAILY, Disp: 180 tablet, Rfl: 0  •  tamsulosin (FLOMAX) 0.4 MG capsule 24 hr capsule, Take 2 capsules by mouth Every Night., Disp: 90 capsule, Rfl: 0  •  cephalexin (KEFLEX) 500 MG capsule, Take 1 capsule by mouth 3 (Three) Times a Day for 10 days., Disp: 30 capsule, Rfl: 0  •  silver sulfadiazine (SILVADENE, SSD) 1 % cream, Apply  topically to the appropriate area as directed 2 (Two) Times a Day., Disp: 400 g, Rfl: 0  •  terbinafine (lamiSIL) 250 MG tablet, Take 1 tablet by mouth Daily for 7 days. Take medications the first week, off x 3 weeks.  Repeat cycle., Disp: 14 tablet, Rfl: 11         Review of Systems   Constitutional: Negative for chills and diaphoresis.   Eyes: Negative for visual disturbance.   Respiratory: Negative for shortness of breath.    Cardiovascular: Negative for chest pain and palpitations.   Gastrointestinal: Negative for abdominal pain and nausea.   Endocrine: Negative for polydipsia and polyphagia.   Musculoskeletal: Negative for neck stiffness.   Skin: Negative for color change and pallor.   Neurological: Positive for numbness. Negative for tingling, seizures and syncope.   Hematological: Negative for adenopathy.       I have reviewed and confirmed the accuracy of the ROS as documented by the MA/LPN/RN Jl Guidry MD      Objective   /78 (BP Location: Left arm, Patient Position: Sitting, Cuff Size: Adult)   Pulse 69   Resp 18   Ht 177.8 cm (70\")   Wt 95.6 kg (210 lb 12.8 oz)   SpO2 98%   BMI 30.25 kg/m²   BP Readings from Last 3 Encounters:   07/14/20 124/78   06/25/20 126/75   06/01/20 117/75     Wt Readings from Last 3 Encounters:   07/14/20 95.6 kg (210 lb 12.8 oz)   06/25/20 97.5 kg (215 lb)   06/01/20 98.8 kg (217 lb 12.8 oz)     Physical Exam "   Constitutional: He is oriented to person, place, and time. He appears well-developed and well-nourished.   Cardiovascular: Normal rate, regular rhythm, S1 normal, S2 normal, normal heart sounds, intact distal pulses and normal pulses. Exam reveals no gallop and no friction rub.   No murmur heard.  Pulmonary/Chest: Effort normal and breath sounds normal. No accessory muscle usage or stridor. He has no decreased breath sounds. He has no wheezes. He has no rhonchi. He has no rales.   Abdominal: Soft. Normal appearance, normal aorta and bowel sounds are normal. He exhibits no distension, no pulsatile midline mass and no mass. There is no hepatosplenomegaly. There is no tenderness. There is no rigidity, no rebound, no guarding, no CVA tenderness and negative Marc's sign. No hernia.   Neurological: He is alert and oriented to person, place, and time. Coordination and gait normal.   Skin: Skin is warm and dry. Turgor is normal. He is not diaphoretic. No pallor.   Callus left foot, with crack, mildly inflamed, no induration or fluctuance, positive onychomycosis         Assessment/Plan      Medications        Problem List         LOS    Palpitations.  Stable on metoprolol.  Has had cardiology eval in the past, has seen Dr. Collazo, he is considering following up with cardiology at [facility]  Tinea pedis.  Positive onychomycosis.  Start Lamisil.  Cellulitis of foot.  Infected callus.  Topical care discussed with start antibiotics.  Call return if worsening symptoms.  Shortness of breath.    Improved today.  Has had cardiac stress testing, will get records.  Recent echocardiogram benign.  Longstanding smoking history, DDX includes COPD, improved on pulmonary toilet.,    PFTs normal.  Chest x-ray normal.  Follow-up recheck.  Fatigue.    Improved.  Blood work benign.  He declines sleep study.  Consider add Wellbutrin.  Hypothyroidism.  Free T4 mildly high, recheck labs 3 months.  Hypogonadism.  Off testosterone.  Followed by  urology.  Tobacco use.  Encourage cessation.  Has cut back.  .  Hypertension.  Good control on metoprolol.  Hyperlipidemia.  Doing well on statin.  Continue fish oil.  Expect improvement in triglycerides with cessation testosterone, follow-up recheck.  Consider fibroid if persistent elevation.  Alcoholic polyneuropathy.  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      Did not tolerate nightly amitriptyline. .  Has quit drinking.  Skin tags.  today.  Topical care discussed.  Seborrheic keratosis.  Frozen today.  Follow-up recheck.  Consider repeat freezing, resection if persistent symptoms.  Left leg swelling.  Improved/resolved currently. Benign exam today.  Has gained weight recently.  Restarted regular exercise program.  Follow-up recheck.  Consider sleep eval if persistent symptoms.  Elevate legs, low-sodium diet.  Lumbar disc disease/history of laminectomy possibly contributing.  Echo benign 2019.  Homans negative/calf diameter symmetric today.  Call return if worsening symptoms.      Problem List Items Addressed This Visit        Unprioritized    Overweight    History of tobacco use    Swelling of left foot - Primary    Cellulitis of foot    Callus of foot    Relevant Medications    silver sulfadiazine (SILVADENE, SSD) 1 % cream    Palpitations              Expected course, medications, and adverse effects discussed.  Call or return if worsening or persistent symptoms.

## 2020-07-14 ENCOUNTER — OFFICE VISIT (OUTPATIENT)
Dept: FAMILY MEDICINE CLINIC | Facility: CLINIC | Age: 75
End: 2020-07-14

## 2020-07-14 VITALS
DIASTOLIC BLOOD PRESSURE: 78 MMHG | SYSTOLIC BLOOD PRESSURE: 124 MMHG | HEART RATE: 69 BPM | BODY MASS INDEX: 30.18 KG/M2 | OXYGEN SATURATION: 98 % | WEIGHT: 210.8 LBS | RESPIRATION RATE: 18 BRPM | HEIGHT: 70 IN

## 2020-07-14 DIAGNOSIS — L03.119 CELLULITIS OF FOOT: ICD-10-CM

## 2020-07-14 DIAGNOSIS — R00.2 PALPITATIONS: ICD-10-CM

## 2020-07-14 DIAGNOSIS — L84 CALLUS OF FOOT: ICD-10-CM

## 2020-07-14 DIAGNOSIS — E66.3 OVERWEIGHT: ICD-10-CM

## 2020-07-14 DIAGNOSIS — M79.89 SWELLING OF LEFT FOOT: Primary | ICD-10-CM

## 2020-07-14 DIAGNOSIS — Z87.891 HISTORY OF TOBACCO USE: ICD-10-CM

## 2020-07-14 PROCEDURE — 99214 OFFICE O/P EST MOD 30 MIN: CPT | Performed by: FAMILY MEDICINE

## 2020-07-14 RX ORDER — TERBINAFINE HYDROCHLORIDE 250 MG/1
250 TABLET ORAL DAILY
Qty: 14 TABLET | Refills: 11 | Status: SHIPPED | OUTPATIENT
Start: 2020-07-14 | End: 2020-07-21

## 2020-07-14 RX ORDER — CEPHALEXIN 500 MG/1
500 CAPSULE ORAL 3 TIMES DAILY
Qty: 30 CAPSULE | Refills: 0 | Status: SHIPPED | OUTPATIENT
Start: 2020-07-14 | End: 2020-07-24

## 2020-09-14 ENCOUNTER — OFFICE VISIT (OUTPATIENT)
Dept: FAMILY MEDICINE CLINIC | Facility: CLINIC | Age: 75
End: 2020-09-14

## 2020-09-14 VITALS
OXYGEN SATURATION: 100 % | WEIGHT: 210.6 LBS | RESPIRATION RATE: 20 BRPM | TEMPERATURE: 96.9 F | SYSTOLIC BLOOD PRESSURE: 146 MMHG | HEIGHT: 70 IN | HEART RATE: 62 BPM | BODY MASS INDEX: 30.15 KG/M2 | DIASTOLIC BLOOD PRESSURE: 75 MMHG

## 2020-09-14 DIAGNOSIS — Z00.00 MEDICARE ANNUAL WELLNESS VISIT, SUBSEQUENT: Primary | ICD-10-CM

## 2020-09-14 DIAGNOSIS — N40.1 BENIGN PROSTATIC HYPERPLASIA WITH LOWER URINARY TRACT SYMPTOMS, SYMPTOM DETAILS UNSPECIFIED: ICD-10-CM

## 2020-09-14 DIAGNOSIS — E03.9 ACQUIRED HYPOTHYROIDISM: Chronic | ICD-10-CM

## 2020-09-14 DIAGNOSIS — I10 ESSENTIAL HYPERTENSION: Chronic | ICD-10-CM

## 2020-09-14 DIAGNOSIS — R09.89 CAROTID BRUIT, UNSPECIFIED LATERALITY: ICD-10-CM

## 2020-09-14 DIAGNOSIS — Z87.891 HISTORY OF TOBACCO USE: ICD-10-CM

## 2020-09-14 DIAGNOSIS — G62.1 ALCOHOLIC POLYNEUROPATHY (HCC): ICD-10-CM

## 2020-09-14 DIAGNOSIS — E78.2 MIXED HYPERLIPIDEMIA: ICD-10-CM

## 2020-09-14 DIAGNOSIS — J44.1 CHRONIC OBSTRUCTIVE PULMONARY DISEASE WITH ACUTE EXACERBATION (HCC): Chronic | ICD-10-CM

## 2020-09-14 DIAGNOSIS — M25.511 ACUTE PAIN OF RIGHT SHOULDER: ICD-10-CM

## 2020-09-14 DIAGNOSIS — E66.09 CLASS 1 OBESITY DUE TO EXCESS CALORIES WITH SERIOUS COMORBIDITY AND BODY MASS INDEX (BMI) OF 30.0 TO 30.9 IN ADULT: ICD-10-CM

## 2020-09-14 PROBLEM — E66.811 CLASS 1 OBESITY DUE TO EXCESS CALORIES WITH SERIOUS COMORBIDITY AND BODY MASS INDEX (BMI) OF 30.0 TO 30.9 IN ADULT: Status: ACTIVE | Noted: 2017-12-12

## 2020-09-14 LAB
BILIRUB BLD-MCNC: NEGATIVE MG/DL
CLARITY, POC: CLEAR
COLOR UR: YELLOW
GLUCOSE UR STRIP-MCNC: NEGATIVE MG/DL
KETONES UR QL: NEGATIVE
LEUKOCYTE EST, POC: NEGATIVE
NITRITE UR-MCNC: NEGATIVE MG/ML
PH UR: 5 [PH] (ref 5–8)
PROT UR STRIP-MCNC: ABNORMAL MG/DL
RBC # UR STRIP: ABNORMAL /UL
SP GR UR: 1.02 (ref 1–1.03)
UROBILINOGEN UR QL: NORMAL

## 2020-09-14 PROCEDURE — G0439 PPPS, SUBSEQ VISIT: HCPCS | Performed by: FAMILY MEDICINE

## 2020-09-14 PROCEDURE — 99497 ADVNCD CARE PLAN 30 MIN: CPT | Performed by: FAMILY MEDICINE

## 2020-09-14 PROCEDURE — 99214 OFFICE O/P EST MOD 30 MIN: CPT | Performed by: FAMILY MEDICINE

## 2020-09-14 PROCEDURE — 96160 PT-FOCUSED HLTH RISK ASSMT: CPT | Performed by: FAMILY MEDICINE

## 2020-09-14 PROCEDURE — 81002 URINALYSIS NONAUTO W/O SCOPE: CPT | Performed by: FAMILY MEDICINE

## 2020-09-14 RX ORDER — FENOFIBRATE 145 MG/1
145 TABLET, COATED ORAL DAILY
Qty: 90 TABLET | Refills: 3 | Status: SHIPPED | OUTPATIENT
Start: 2020-09-14 | End: 2021-08-04 | Stop reason: SDUPTHER

## 2020-09-14 RX ORDER — LORAZEPAM 0.5 MG/1
TABLET ORAL
Qty: 4 TABLET | Refills: 0 | Status: SHIPPED | OUTPATIENT
Start: 2020-09-14 | End: 2021-06-18

## 2020-09-14 NOTE — PROGRESS NOTES
Subjective   Shai Chery is a 74 y.o. male.     Chief Complaint   Patient presents with   • Medicare Wellness-subsequent   • Hyperlipidemia   • Hypothyroidism   • Hypertension       The patient is here: to discuss health maintenance and disease prevention to follow up on multiple medical problems.  Last comprehensive physical was on unknown.  Patient's previous physician was Dr.Reggie Forbes.  Overall has: moderate activity with work/home activities, exercises 2 - 3 times per week, good appetite, feels well with minor complaints, good energy level and is sleeping poorly. Nutrition: appropriate balanced diet, balanced diet and eating a variety of foods. Last tetanus shot was 5/3/2008. Patient's last colonoscopy was: 2/11/2020.. Patients last CT low dose was 6/7/2019. Patients last echo was 11/1/2019. Patients last PSA was 8/31/2020 result was 1.0.    Hyperlipidemia  This is a chronic problem. The current episode started more than 1 year ago. Recent lipid tests were reviewed and are high. Exacerbating diseases include hypothyroidism. Pertinent negatives include no chest pain, leg pain, myalgias or shortness of breath. Current antihyperlipidemic treatment includes statins. The current treatment provides mild improvement of lipids. Risk factors for coronary artery disease include dyslipidemia, hypertension and male sex.   Hypothyroidism  This is a chronic problem. The current episode started more than 1 year ago. The problem has been gradually improving. Associated symptoms include chills. Pertinent negatives include no abdominal pain, chest pain, diaphoresis, myalgias or nausea. Treatments tried: synthroid 200mcg. The treatment provided moderate relief.   Hypertension  This is a chronic problem. The current episode started more than 1 year ago. Pertinent negatives include no chest pain, palpitations or shortness of breath. Risk factors for coronary artery disease include dyslipidemia and male gender. Current  antihypertension treatment includes beta blockers. The current treatment provides mild improvement.        Recent Hospitalizations:  No hospitalization(s) within the last year..  ccc    I personally reviewed and updated the patient's allergies, medications, problem list, and past medical, surgical, social, and family history. I have reviewed and confirmed the accuracy of the HPI and ROS as documented by the MA/LPN/RN Jl Guidry MD      Family History   Problem Relation Age of Onset   • Diabetes Mother    • Stroke Mother    • Other Mother         Thyroid Disease   • Heart disease Mother         Ischemic   • Cancer Mother    • COPD Mother    • Hypertension Father    • Heart disease Father         Ischemic   • Hyperlipidemia Father    • Cancer Father    • Arthritis Paternal Grandmother    • Cancer Sister    • Diabetes Maternal Grandmother    • Hearing loss Sister        Social History     Tobacco Use   • Smoking status: Former Smoker     Packs/day: 1.00     Years: 40.00     Pack years: 40.00     Start date: 1980     Quit date: 2020     Years since quittin.7   • Smokeless tobacco: Never Used   • Tobacco comment: quit 2020   Substance Use Topics   • Alcohol use: Yes     Types: 2 Glasses of wine per week   • Drug use: No       Past Surgical History:   Procedure Laterality Date   • ANKLE HARDWARE REMOVAL Right    • APPENDECTOMY     • COLONOSCOPY      every 3 years with [facility]   • EYE SURGERY Bilateral     cataracts   • JOINT REPLACEMENT Right 2019    knee   • KNEE ARTHROSCOPY  2017    and 2014. Dr Gallego. With partial medial and lateral miniscectomy.    • KNEE ARTHROSCOPY Right 2017   • KNEE ARTHROSCOPY W/ MENISCECTOMY Right 2018    right knee scope/meniscectomy   • LUMBAR DISC SURGERY  2015    Right L3-4 diskectomy. Dr. Pollard.    • ROTATOR CUFF REPAIR  2014    Dr Gallego.   • SHOULDER SURGERY Left    • THYROIDECTOMY     • TONSILLECTOMY     • TOTAL KNEE ARTHROPLASTY  Right 01/16/2019    Right TKR   • TOTAL KNEE ARTHROPLASTY Right 01/16/2019    Dr. Crain       Patient Active Problem List   Diagnosis   • Status post total knee replacement, right   • Primary osteoarthritis of left knee   • Occipital neuralgia   • Arthritis   • Benign prostatic hyperplasia   • Derangement of medial meniscus of right knee   • Oral phase dysphagia   • Dystrophia unguium   • Mixed hyperlipidemia   • Acquired hypothyroidism   • Meningioma (CMS/HCC)   • Neuropathy   • Class 1 obesity due to excess calories with serious comorbidity and body mass index (BMI) of 30.0 to 30.9 in adult   • Primary localized osteoarthritis   • Nasal polyp   • Rotator cuff tear   • Presence of right artificial knee joint   • Alcoholic polyneuropathy (CMS/HCC)   • Brain mass   • COPD (chronic obstructive pulmonary disease) (CMS/HCC)   • Diverticulosis   • Fracture of fibula   • Gastro-esophageal reflux   • Hypoglycemia   • Impotence of organic origin   • Insomnia   • Thrombocytopenia (CMS/HCC)   • Tubular adenoma of colon   • Sinus tachycardia   • Essential hypertension   • Dyspnea   • Shortness of breath   • History of tobacco use   • Skin tag   • Swelling of left foot   • Cellulitis of foot   • Callus of foot   • Palpitations   • Medicare annual wellness visit, subsequent         Current Outpatient Medications:   •  albuterol sulfate  (90 Base) MCG/ACT inhaler, , Disp: , Rfl:   •  atorvastatin (LIPITOR) 20 MG tablet, Take 1 tablet by mouth Daily., Disp: 90 tablet, Rfl: 3  •  baclofen (LIORESAL) 10 MG tablet, Take 1 tablet twice daily as needed, Disp: 60 tablet, Rfl: 2  •  dutasteride (Avodart) 0.5 MG capsule, Take 1 capsule by mouth Daily., Disp: 90 capsule, Rfl: 3  •  gabapentin (NEURONTIN) 300 MG capsule, TAKE 1 CAPSULE IN THE MORNING  AND AT NOON,  AND TAKE 3 CAPSULES AT BEDTIME, Disp: 450 capsule, Rfl: 3  •  levothyroxine (SYNTHROID, LEVOTHROID) 200 MCG tablet, Take 1 tablet by mouth Daily., Disp: 90 tablet, Rfl:  "3  •  metoprolol tartrate (LOPRESSOR) 50 MG tablet, TAKE 1 TABLET TWICE DAILY, Disp: 180 tablet, Rfl: 0  •  tamsulosin (FLOMAX) 0.4 MG capsule 24 hr capsule, Take 2 capsules by mouth Every Night., Disp: 90 capsule, Rfl: 0  •  fenofibrate (Tricor) 145 MG tablet, Take 1 tablet by mouth Daily., Disp: 90 tablet, Rfl: 3  •  LORazepam (Ativan) 0.5 MG tablet, take 1 tablet 30 minutes prior to procedure as needed, Disp: 4 tablet, Rfl: 0  •  silver sulfadiazine (SILVADENE, SSD) 1 % cream, Apply  topically to the appropriate area as directed 2 (Two) Times a Day., Disp: 400 g, Rfl: 0         Review of Systems   Constitutional: Positive for chills. Negative for diaphoresis.   HENT: Negative for trouble swallowing and voice change.    Eyes: Negative for visual disturbance.   Respiratory: Negative for shortness of breath.    Cardiovascular: Negative for chest pain and palpitations.   Gastrointestinal: Negative for abdominal pain and nausea.   Endocrine: Negative for polydipsia and polyphagia.   Genitourinary: Negative for hematuria.   Musculoskeletal: Negative for myalgias and neck stiffness.   Skin: Negative for color change and pallor.   Allergic/Immunologic: Negative for immunocompromised state.   Neurological: Negative for seizures and syncope.   Hematological: Negative for adenopathy.   Psychiatric/Behavioral: Negative for hallucinations, sleep disturbance and suicidal ideas.       I have reviewed and confirmed the accuracy of the ROS as documented by the MA/LPN/RN Jl Guidry MD      Objective   /75 (BP Location: Right arm, Patient Position: Sitting, Cuff Size: Adult)   Pulse 62   Temp 96.9 °F (36.1 °C)   Resp 20   Ht 177.8 cm (70\")   Wt 95.5 kg (210 lb 9.6 oz)   SpO2 100%   BMI 30.22 kg/m²   BP Readings from Last 3 Encounters:   09/14/20 146/75   07/14/20 124/78   06/25/20 126/75     Wt Readings from Last 3 Encounters:   09/14/20 95.5 kg (210 lb 9.6 oz)   07/14/20 95.6 kg (210 lb 12.8 oz)   06/25/20 97.5 kg " (215 lb)     Physical Exam  Constitutional:       Appearance: He is well-developed. He is not diaphoretic.   HENT:      Head: Normocephalic.      Right Ear: Tympanic membrane, ear canal and external ear normal.      Left Ear: Tympanic membrane, ear canal and external ear normal.      Nose: Nose normal.   Eyes:      General: Lids are normal.      Conjunctiva/sclera: Conjunctivae normal.      Pupils: Pupils are equal, round, and reactive to light.   Neck:      Thyroid: No thyromegaly.      Vascular: No carotid bruit or JVD.      Trachea: No tracheal deviation.   Cardiovascular:      Rate and Rhythm: Normal rate and regular rhythm.      Heart sounds: Normal heart sounds. No murmur. No friction rub. No gallop.    Pulmonary:      Effort: Pulmonary effort is normal.      Breath sounds: Normal breath sounds. No stridor. No decreased breath sounds, wheezing or rales.   Abdominal:      General: Bowel sounds are normal. There is no distension.      Palpations: Abdomen is soft. There is no mass.      Tenderness: There is no abdominal tenderness. There is no guarding or rebound.      Hernia: No hernia is present.   Lymphadenopathy:      Head:      Right side of head: No submental, submandibular, tonsillar, preauricular, posterior auricular or occipital adenopathy.      Left side of head: No submental, submandibular, tonsillar, preauricular, posterior auricular or occipital adenopathy.      Cervical: No cervical adenopathy.   Skin:     General: Skin is warm and dry.      Coloration: Skin is not pale.   Neurological:      Mental Status: He is alert and oriented to person, place, and time.      Cranial Nerves: No cranial nerve deficit.      Sensory: No sensory deficit.      Coordination: Coordination normal.      Gait: Gait normal.      Deep Tendon Reflexes: Reflexes are normal and symmetric.         Recent Lab Results:    Lab Results   Component Value Date     (H) 08/31/2020        Lab Results   Component Value Date     CHOL 159 09/27/2017    CHOL 171 10/08/2016    CHLPL 156 08/31/2020    CHLPL 163 08/30/2019     Lab Results   Component Value Date    TRIG 267 (H) 08/31/2020    TRIG 206 (H) 08/30/2019    TRIG 171 (H) 09/27/2017     Lab Results   Component Value Date    HDL 35 (L) 08/31/2020    HDL 35 (L) 08/30/2019    HDL 40 (L) 09/27/2017     Lab Results   Component Value Date    LDL 77 08/31/2020    LDL 87 08/30/2019    LDL 92 MG/DL (CALC) 09/27/2017     Lab Results   Component Value Date    PSA 1.0 08/31/2020    PSA 0.7 08/30/2019     Lab Results   Component Value Date    WBC 4.7 08/31/2020    HGB 14.6 08/31/2020    HCT 42.9 08/31/2020    MCV 90 08/31/2020     (L) 08/31/2020     Lab Results   Component Value Date    TSH 0.992 08/31/2020     Lab Results   Component Value Date    GLUCOSE 64 (L) 05/21/2019    BUN 21 08/31/2020    CREATININE 1.01 08/31/2020    EGFRIFNONA 73 08/31/2020    EGFRIFAFRI 85 08/31/2020    BCR 21 08/31/2020    K 4.2 08/31/2020    CO2 22 08/31/2020    CALCIUM 8.9 08/31/2020    PROTENTOTREF 6.4 08/31/2020    ALBUMIN 4.4 08/31/2020    LABIL2 2.2 08/31/2020    AST 39 08/31/2020    ALT 36 08/31/2020         Age-appropriate Screening Schedule:  Refer to the list below for future screening recommendations based on patient's age, sex and/or medical conditions. Orders for these recommended tests are listed in the plan section. The patient has been provided with a written plan.    Health Maintenance   Topic Date Due   • TDAP/TD VACCINES (1 - Tdap) 12/29/1964   • INFLUENZA VACCINE  08/01/2020   • LIPID PANEL  08/31/2021   • COLONOSCOPY  02/11/2030   • ZOSTER VACCINE  Completed       Depression Screen:   PHQ-2/PHQ-9 Depression Screening 9/14/2020   Little interest or pleasure in doing things 0   Feeling down, depressed, or hopeless 0   Trouble falling or staying asleep, or sleeping too much 0   Feeling tired or having little energy 0   Poor appetite or overeating 0   Feeling bad about yourself - or that you are a  failure or have let yourself or your family down 0   Trouble concentrating on things, such as reading the newspaper or watching television 0   Moving or speaking so slowly that other people could have noticed. Or the opposite - being so fidgety or restless that you have been moving around a lot more than usual 0   Thoughts that you would be better off dead, or of hurting yourself in some way 0   Total Score 0   If you checked off any problems, how difficult have these problems made it for you to do your work, take care of things at home, or get along with other people? Not difficult at all     I spent more than 16 minutes asking patient questions, counseling and documenting in the chart.    Health Habits and Functional and Cognitive Screening:  Functional & Cognitive Status 9/14/2020   Do you have difficulty preparing food and eating? No   Do you have difficulty bathing yourself, getting dressed or grooming yourself? No   Do you have difficulty using the toilet? No   Do you have difficulty moving around from place to place? No   Do you have trouble with steps or getting out of a bed or a chair? No   Current Diet Well Balanced Diet   Dental Exam Up to date   Eye Exam Up to date   Exercise (times per week) 4 times per week   Current Exercise Activities Include Walking   Do you need help using the phone?  No   Are you deaf or do you have serious difficulty hearing?  Yes   Do you need help with transportation? No   Do you need help shopping? No   Do you need help preparing meals?  No   Do you need help with housework?  No   Do you need help with laundry? No   Do you need help taking your medications? No   Do you need help managing money? No   Do you ever drive or ride in a car without wearing a seat belt? No   Have you felt unusual stress, anger or loneliness in the last month? No   Who do you live with? Spouse   If you need help, do you have trouble finding someone available to you? No   Have you been bothered in the  last four weeks by sexual problems? No   Do you have difficulty concentrating, remembering or making decisions? No       Does the patient have evidence of cognitive impairment? No    Advance Care Planning:  ACP discussion was held with the patient during this visit. Patient has an advance directive in EMR which is still valid.      A face-to-face visit was completed today with patient.  Counseling explanation, and discussion of advanced directives was performed.   The last advanced care visit was performed in unknown.  In a near life ending situation, from which he is not expected to recover functionally, and he is not able to speak for him, he does not want life sustaining measures. We discussed feeding tubes, ventilators and cardiac support as well as dialysis.    I spent more than 16 minutes discussing Advanced Care Planning information and documenting in the chart.    Identification of Risk Factors:  Risk factors include: Abdominal Aortic Aneurysm Screening  Advance Directive Discussion  Alcohol Misuse  Fall Risk  Glaucoma Risk.    Compared to one year ago, the patient feels his physical health is the same.  Compared to one year ago, the patient feels his mental health is the same.    Patient Self-Management and Personalized Health Advice  The patient has been provided with information about: diet, exercise, weight management and prevention of cardiac or vascular disease and preventive services including:   · Alcohol Misuse Screening and Counseling  (15 minutes counseling time, Code )  · Annual Wellness Visit (AWV)  · Colorectal Cancer Screening, Colonoscopy  · Glaucoma screening (for individuals with diabetes mellitus, family history of glaucoma, -Americans (> or =) age 50, -Americans (> or =) age 65)  · Human Immunodeficiency Virus (HIV) Screening (Medicare Preventive Educational Tool used for proper ICD-10 diagnosis codes)  · Initial Preventative Physical Examination  (IPPE).      Assessment/Plan      Medications        Problem List         LOS Medicare [place]..  Doing well, vaccines current.  Coated baby aspirin daily.  Discussed health maintenance, screening test, lifestyle modification.  Palpitations.  Stable on metoprolol.    Echo benign 2018.  Has had cardiology eval in the past, has seen Dr. Collazo, he is considering following up with [facility]  Tinea pedis.  Positive onychomycosis.  Improved on Lamisil.  COPD, improved on pulmonary toilet.,    PFTs normal.  Chest x-ray normal.  Follow-up recheck.  Fatigue.    Improved.  Blood work benign.  He declines sleep study.  Consider add Wellbutrin.  Hypothyroidism.  Free T4 mildly high, recheck labs 3 months.  Hypogonadism.  Off testosterone.  Followed by urology.  Tobacco use.  Encourage cessation.  Has cut back.  CT chest lung cancer screening scheduled.  Check carotid Dopplers.  .  Hypertension.  Good control on metoprolol.  Echo benign 2018.  Has had cardiac stress testing in the past, will get records.  Consider repeat.  Hyperlipidemia.  Doing well on statin.  Continue fish oil.  Persistent elevation in triglycerides add fibrate.  Alcoholic polyneuropathy.  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      Did not tolerate nightly amitriptyline. .  Has quit drinking.  Skin tags.  today.  Topical care discussed.  Seborrheic keratosis.  Improved today status post freezing.  Followed by dermatology.  Left leg swelling.  Improved/resolved currently. Benign exam today.  Has gained weight recently.  Restarted regular exercise program.  Follow-up recheck.  Consider sleep eval if persistent symptoms.  Elevate legs, low-sodium diet.  Lumbar disc disease/history of laminectomy possibly contributing.  Echo benign 2019.  Homans negative/calf diameter symmetric today.  Call return if worsening symptoms.  BPH.  Followed by urology.  Off Flomax, on Myrbetriq.  Colon screening.  Status post colonoscopy with polypectomy 2020, followed by  [facility].  Prostate screening.  PSA benign.  AAA.  4.3 cm per CT chest 10/20.  Stop smoking.  Recheck ultrasound 1 year.  Meningioma.  1.9 cm foramen magnum, with some brainstem compression, asymptomatic.  Followed by neurosurgery Dr. hale, repeat imaging stable 10/20.       Problem List Items Addressed This Visit        Unprioritized    Mixed hyperlipidemia (Chronic)    Relevant Medications    fenofibrate (Tricor) 145 MG tablet    Acquired hypothyroidism (Chronic)    COPD (chronic obstructive pulmonary disease) (CMS/HCC) (Chronic)    Essential hypertension (Chronic)    Benign prostatic hyperplasia    Class 1 obesity due to excess calories with serious comorbidity and body mass index (BMI) of 30.0 to 30.9 in adult    Alcoholic polyneuropathy (CMS/HCC)    History of tobacco use    Relevant Orders    CT Chest Low Dose Wo    Medicare annual wellness visit, subsequent - Primary    Relevant Orders    POCT urinalysis dipstick, manual (Completed)      Other Visit Diagnoses     Acute pain of right shoulder        Relevant Medications    LORazepam (Ativan) 0.5 MG tablet    Carotid bruit, unspecified laterality        Relevant Orders    US Carotid Bilateral              Expected course, medications, and adverse effects discussed.  Call or return if worsening or persistent symptoms.         I wore protective equipment throughout this patient encounter to include mask, gloves and eye protection. Hand hygiene was performed before donning protective equipment and after removal when leaving the room.

## 2020-09-15 ENCOUNTER — TELEPHONE (OUTPATIENT)
Dept: FAMILY MEDICINE CLINIC | Facility: CLINIC | Age: 75
End: 2020-09-15

## 2020-09-15 NOTE — TELEPHONE ENCOUNTER
Yes, I sent a different one, Ativan, 0.5 and this 1 is similar to the 5 mg of Valium, it is better because it may wear off quicker anymore be drowsy to drive home, thanks

## 2020-09-15 NOTE — TELEPHONE ENCOUNTER
Patient states he usually take 5mgx2 of valium for MRI. But 0.5mg was called into the pharmacy. He would like to see if this can be changed

## 2020-10-07 ENCOUNTER — TELEPHONE (OUTPATIENT)
Dept: FAMILY MEDICINE CLINIC | Facility: CLINIC | Age: 75
End: 2020-10-07

## 2020-10-07 DIAGNOSIS — Z87.891 HISTORY OF TOBACCO USE: ICD-10-CM

## 2020-10-07 NOTE — TELEPHONE ENCOUNTER
----- Message from Jl Guidry MD sent at 10/7/2020  9:45 AM EDT -----  Your CT scan of the chest looks good, no concerning nodules in your chest, it does show that you have developed an abdominal aortic aneurysm that is 4.3 cm, will have to get about 5 cm to consider repair, continue to work on quitting smoking, will check an ultrasound of aorta in a year to make sure this is not worsening, thanks

## 2020-10-07 NOTE — TELEPHONE ENCOUNTER
"I called and let rakesh know. He asked if you had a chance to view his mri of his brain he had done this week? It is in care everywhere. Click the \"other results\" tab and the mri is at the top.  "

## 2020-10-08 NOTE — TELEPHONE ENCOUNTER
Let him know I was able to review that, it looks like his meningioma at the base of his brain is stable from the last check, want him to keep his follow-up with neurosurgery as planned, thanks

## 2020-10-30 ENCOUNTER — TELEPHONE (OUTPATIENT)
Dept: FAMILY MEDICINE CLINIC | Facility: CLINIC | Age: 75
End: 2020-10-30

## 2020-10-30 NOTE — TELEPHONE ENCOUNTER
Let him know good news his cardiac stress test is normal, his carotid Dopplers also look good, he has some mild blockage on both sides but is less than 50%, we will plan to recheck carotid Dopplers 2 to 3 years, thanks

## 2020-12-11 DIAGNOSIS — M25.511 ACUTE PAIN OF RIGHT SHOULDER: Primary | ICD-10-CM

## 2020-12-15 RX ORDER — GABAPENTIN 300 MG/1
CAPSULE ORAL
Qty: 450 CAPSULE | Refills: 3 | OUTPATIENT
Start: 2020-12-15

## 2020-12-15 NOTE — TELEPHONE ENCOUNTER
Called and informed patient. I was able to schedule him for a telephone visit tomorrow 12/15/2020, to get his gabapentin refilled.

## 2020-12-15 NOTE — TELEPHONE ENCOUNTER
Lets get him scheduled for follow-up visit to get his gabapentin refill on/needs to be seen every 3 months for this, could do a phone visit if he prefers, thanks

## 2020-12-16 ENCOUNTER — OFFICE VISIT (OUTPATIENT)
Dept: FAMILY MEDICINE CLINIC | Facility: CLINIC | Age: 75
End: 2020-12-16

## 2020-12-16 VITALS — WEIGHT: 203 LBS | HEIGHT: 70 IN | TEMPERATURE: 97.8 F | BODY MASS INDEX: 29.06 KG/M2

## 2020-12-16 DIAGNOSIS — J44.1 CHRONIC OBSTRUCTIVE PULMONARY DISEASE WITH ACUTE EXACERBATION (HCC): Chronic | ICD-10-CM

## 2020-12-16 DIAGNOSIS — Z87.891 HISTORY OF TOBACCO USE: ICD-10-CM

## 2020-12-16 DIAGNOSIS — E78.2 MIXED HYPERLIPIDEMIA: Chronic | ICD-10-CM

## 2020-12-16 DIAGNOSIS — E66.09 CLASS 1 OBESITY DUE TO EXCESS CALORIES WITH SERIOUS COMORBIDITY AND BODY MASS INDEX (BMI) OF 30.0 TO 30.9 IN ADULT: ICD-10-CM

## 2020-12-16 DIAGNOSIS — I10 ESSENTIAL HYPERTENSION: Chronic | ICD-10-CM

## 2020-12-16 DIAGNOSIS — G62.9 NEUROPATHY: ICD-10-CM

## 2020-12-16 DIAGNOSIS — I71.40 ABDOMINAL AORTIC ANEURYSM (AAA) WITHOUT RUPTURE (HCC): ICD-10-CM

## 2020-12-16 DIAGNOSIS — G62.1 ALCOHOLIC POLYNEUROPATHY (HCC): Primary | ICD-10-CM

## 2020-12-16 PROCEDURE — 99443 PR PHYS/QHP TELEPHONE EVALUATION 21-30 MIN: CPT | Performed by: FAMILY MEDICINE

## 2020-12-16 RX ORDER — MIRABEGRON 50 MG/1
TABLET, FILM COATED, EXTENDED RELEASE ORAL
COMMUNITY
Start: 2020-09-24 | End: 2022-03-07

## 2020-12-16 RX ORDER — GABAPENTIN 300 MG/1
CAPSULE ORAL
Qty: 450 CAPSULE | Refills: 2 | Status: SHIPPED | OUTPATIENT
Start: 2020-12-16 | End: 2020-12-23 | Stop reason: SDUPTHER

## 2020-12-16 RX ORDER — GABAPENTIN 300 MG/1
CAPSULE ORAL
Qty: 450 CAPSULE | Refills: 2 | Status: SHIPPED | OUTPATIENT
Start: 2020-12-16 | End: 2020-12-16

## 2020-12-16 NOTE — PROGRESS NOTES
Subjective   Shai Chery is a 74 y.o. male.     Chief Complaint   Patient presents with   • Numbness       Alcohol Problem  This is a chronic problem. The current episode started more than 1 year ago. Associated symptoms include myalgias and numbness. Pertinent negatives include no abdominal pain, chest pain, chills, diaphoresis or nausea. The symptoms are aggravated by drinking. Treatments tried: gabapentin for the polyneuropathy. The treatment provided moderate relief.            I personally reviewed and updated the patient's allergies, medications, problem list, and past medical, surgical, social, and family history. I have reviewed and confirmed the accuracy of the History of Present Illness and Review of Symptoms as documented by the MA/LPN/RN. Jl Guidry MD    Family History   Problem Relation Age of Onset   • Diabetes Mother    • Stroke Mother    • Other Mother         Thyroid Disease   • Heart disease Mother         Ischemic   • Cancer Mother    • COPD Mother    • Hypertension Father    • Heart disease Father         Ischemic   • Hyperlipidemia Father    • Cancer Father    • Arthritis Paternal Grandmother    • Cancer Sister    • Diabetes Maternal Grandmother    • Hearing loss Sister        Social History     Tobacco Use   • Smoking status: Former Smoker     Packs/day: 1.00     Years: 40.00     Pack years: 40.00     Start date: 1980     Quit date: 2020     Years since quittin.9   • Smokeless tobacco: Never Used   • Tobacco comment: quit 2020   Substance Use Topics   • Alcohol use: Yes     Types: 2 Glasses of wine per week   • Drug use: No       Past Surgical History:   Procedure Laterality Date   • ANKLE HARDWARE REMOVAL Right    • APPENDECTOMY     • COLONOSCOPY      every 3 years with [facility]   • EYE SURGERY Bilateral     cataracts   • JOINT REPLACEMENT Right 2019    knee   • KNEE ARTHROSCOPY  2017    and 2014. Dr Gallego. With partial medial and lateral miniscectomy.    • KNEE  ARTHROSCOPY Right 06/09/2017   • KNEE ARTHROSCOPY W/ MENISCECTOMY Right 09/18/2018    right knee scope/meniscectomy   • LUMBAR DISC SURGERY  11/11/2015    Right L3-4 diskectomy. Dr. Pollard.    • ROTATOR CUFF REPAIR  07/2014    Dr Gallego.   • SHOULDER SURGERY Left    • THYROIDECTOMY  1971   • TONSILLECTOMY  1966   • TOTAL KNEE ARTHROPLASTY Right 01/16/2019    Right TKR   • TOTAL KNEE ARTHROPLASTY Right 01/16/2019    Dr. Crain       Patient Active Problem List   Diagnosis   • Status post total knee replacement, right   • Primary osteoarthritis of left knee   • Occipital neuralgia   • Arthritis   • Benign prostatic hyperplasia   • Derangement of medial meniscus of right knee   • Oral phase dysphagia   • Dystrophia unguium   • Mixed hyperlipidemia   • Acquired hypothyroidism   • Meningioma (CMS/HCC)   • Neuropathy   • Class 1 obesity due to excess calories with serious comorbidity and body mass index (BMI) of 30.0 to 30.9 in adult   • Primary localized osteoarthritis   • Nasal polyp   • Rotator cuff tear   • Presence of right artificial knee joint   • Alcoholic polyneuropathy (CMS/HCC)   • Brain mass   • COPD (chronic obstructive pulmonary disease) (CMS/HCC)   • Diverticulosis   • Fracture of fibula   • Gastro-esophageal reflux   • Hypoglycemia   • Impotence of organic origin   • Insomnia   • Thrombocytopenia (CMS/HCC)   • Tubular adenoma of colon   • Sinus tachycardia   • Essential hypertension   • Dyspnea   • Shortness of breath   • History of tobacco use   • Skin tag   • Swelling of left foot   • Cellulitis of foot   • Callus of foot   • Palpitations   • Medicare annual wellness visit, subsequent   • Abdominal aortic aneurysm (AAA) without rupture (CMS/HCC)         Current Outpatient Medications:   •  albuterol sulfate  (90 Base) MCG/ACT inhaler, , Disp: , Rfl:   •  atorvastatin (LIPITOR) 20 MG tablet, Take 1 tablet by mouth Daily., Disp: 90 tablet, Rfl: 3  •  baclofen (LIORESAL) 10 MG tablet, Take 1 tablet  "twice daily as needed, Disp: 60 tablet, Rfl: 2  •  dutasteride (Avodart) 0.5 MG capsule, Take 1 capsule by mouth Daily., Disp: 90 capsule, Rfl: 3  •  fenofibrate (Tricor) 145 MG tablet, Take 1 tablet by mouth Daily., Disp: 90 tablet, Rfl: 3  •  levothyroxine (SYNTHROID, LEVOTHROID) 200 MCG tablet, Take 1 tablet by mouth Daily., Disp: 90 tablet, Rfl: 3  •  LORazepam (Ativan) 0.5 MG tablet, take 1 tablet 30 minutes prior to procedure as needed, Disp: 4 tablet, Rfl: 0  •  metoprolol tartrate (LOPRESSOR) 50 MG tablet, TAKE 1 TABLET TWICE DAILY, Disp: 180 tablet, Rfl: 0  •  Myrbetriq 50 MG tablet sustained-release 24 hour 24 hr tablet, , Disp: , Rfl:   •  tamsulosin (FLOMAX) 0.4 MG capsule 24 hr capsule, Take 2 capsules by mouth Every Night., Disp: 90 capsule, Rfl: 0  •  gabapentin (NEURONTIN) 300 MG capsule, 1 every morning, 1 every afternoon, and 3 nightly as needed, Disp: 75 capsule, Rfl: 0         Review of Systems   Constitutional: Negative for chills and diaphoresis.   HENT: Negative for trouble swallowing and voice change.    Eyes: Negative for visual disturbance.   Respiratory: Negative for shortness of breath.    Cardiovascular: Negative for chest pain and palpitations.   Gastrointestinal: Negative for abdominal pain and nausea.   Endocrine: Negative for polydipsia and polyphagia.   Genitourinary: Negative for hematuria.   Musculoskeletal: Positive for myalgias. Negative for neck stiffness.   Skin: Negative for color change and pallor.   Allergic/Immunologic: Negative for immunocompromised state.   Neurological: Positive for numbness. Negative for seizures and syncope.   Hematological: Negative for adenopathy.   Psychiatric/Behavioral: Negative for hallucinations, sleep disturbance and suicidal ideas.       I have reviewed and confirmed the accuracy of the ROS as documented by the MA/LPN/RN Jl Guidry MD      Objective   Temp 97.8 °F (36.6 °C)   Ht 177.8 cm (70\")   Wt 92.1 kg (203 lb)   BMI 29.13 kg/m² "   BP Readings from Last 3 Encounters:   09/14/20 146/75   07/14/20 124/78   06/25/20 126/75     Wt Readings from Last 3 Encounters:   12/16/20 92.1 kg (203 lb)   09/14/20 95.5 kg (210 lb 9.6 oz)   07/14/20 95.6 kg (210 lb 12.8 oz)     Physical Exam    Recent Lab Results:    No results found for: HGBA1C  Lab Results   Component Value Date     (H) 08/31/2020     Lab Results   Component Value Date    LDL 77 08/31/2020    LDL 87 08/30/2019    LDL 92 MG/DL (CALC) 09/27/2017     Lab Results   Component Value Date    CHOL 159 09/27/2017    CHOL 171 10/08/2016     Lab Results   Component Value Date    TRIG 267 (H) 08/31/2020    TRIG 206 (H) 08/30/2019    TRIG 171 (H) 09/27/2017     Lab Results   Component Value Date    HDL 35 (L) 08/31/2020    HDL 35 (L) 08/30/2019    HDL 40 (L) 09/27/2017     Lab Results   Component Value Date    PSA 1.0 08/31/2020    PSA 0.7 08/30/2019     Lab Results   Component Value Date    WBC 4.7 08/31/2020    HGB 14.6 08/31/2020    HCT 42.9 08/31/2020    MCV 90 08/31/2020     (L) 08/31/2020     Lab Results   Component Value Date    TSH 0.992 08/31/2020      Lab Results   Component Value Date    GLUCOSE 64 (L) 05/21/2019    BUN 21 08/31/2020    CREATININE 1.01 08/31/2020    EGFRIFNONA 73 08/31/2020    EGFRIFAFRI 85 08/31/2020    BCR 21 08/31/2020    K 4.2 08/31/2020    CO2 22 08/31/2020    CALCIUM 8.9 08/31/2020    PROTENTOTREF 6.4 08/31/2020    ALBUMIN 4.4 08/31/2020    LABIL2 2.2 08/31/2020    AST 39 08/31/2020    ALT 36 08/31/2020     No results found for: RASHMI, RF, SEDRATE   No results found for: CRP   No results found for: IRON, TIBC, FERRITIN   Lab Results   Component Value Date    EEOQHTKR27 >2000 (H) 12/06/2019        Shai Chery consented to undergo a telephone visit today.  This format was necessitated by the current covid-19 virus pandemic.  22 minutes was spent with phone today with Shai discussing his acute concerns of chronic medical problems.    Assessment/Plan       Medications        Problem List         LOS    Health maintenance..  Doing well, vaccines current.  Coated baby aspirin daily.  Discussed health maintenance, screening test, lifestyle modification.  Palpitations.  Stable on metoprolol.    Echo benign 2018.  Has had cardiology eval in the past, has seen Dr. Collazo, he is considering following up with cardiology at [facility], operated by [facility]  Tinea pedis.  Positive onychomycosis.  Improved on Lamisil.  COPD, improved on pulmonary toilet.,    PFTs normal.  Chest x-ray normal.  Follow-up recheck.  Fatigue.    Improved.  Blood work benign.  He declines sleep study.  Consider add Wellbutrin.  Hypothyroidism.  Free T4 mildly high, recheck labs 3 months.  Hypogonadism.  Off testosterone.  Followed by urology.  Tobacco use.  Encourage cessation.  Has cut back.  CT chest lung cancer screening scheduled.  Check carotid Dopplers.  .  Hypertension.  Good control on metoprolol.  Echo benign 2018.  Has had cardiac stress testing in the past, will get records.  Consider repeat.  Hyperlipidemia.  Doing well on statin.  Continue fish oil.  Persistent elevation in triglycerides add fibrate.  Alcoholic polyneuropathy.  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      Did not tolerate nightly amitriptyline. .  Has quit drinking.  Skin tags.  today.  Topical care discussed.  Seborrheic keratosis.  Improved today status post freezing.  Followed by dermatology.  Left leg swelling.  Improved/resolved currently. Benign exam today.  Has gained weight recently.  Restarted regular exercise program.  Follow-up recheck.  Consider sleep eval if persistent symptoms.  Elevate legs, low-sodium diet.  Lumbar disc disease/history of laminectomy possibly contributing.  Echo benign 2019.  Homans negative/calf diameter symmetric today.  Call return if worsening symptoms.  BPH.  Followed by urology.  Off Flomax, on Myrbetriq.  Colon screening.  Status post colonoscopy with polypectomy 2020, followed by [facility].  Prostate screening.  PSA  benign.  AAA.  4.3 cm per CT chest 10/20.  Stop smoking.    Repeat imaging planned in 6 months, followed by vascular surgery Dr. Zamora  Meningioma.  1.9 cm foramen magnum, with some brainstem compression, asymptomatic.  Followed by neurosurgery Dr. hale, repeat imaging stable 10/20.      Problem List Items Addressed This Visit        Unprioritized    Mixed hyperlipidemia (Chronic)    COPD (chronic obstructive pulmonary disease) (CMS/HCC) (Chronic)    Essential hypertension (Chronic)    Neuropathy    Class 1 obesity due to excess calories with serious comorbidity and body mass index (BMI) of 30.0 to 30.9 in adult    Alcoholic polyneuropathy (CMS/HCC) - Primary    History of tobacco use    Abdominal aortic aneurysm (AAA) without rupture (CMS/HCC)              Expected course, medications, and adverse effects discussed.  Call or return if worsening or persistent symptoms.  The complete contents of the Assessment and Plan as documented above have been reviewed and addressed by myself with the patient today as part of an ongoing evaluation / treatment plan.  If some of the documentation has been copied from a previous note and is unchanged it indicates that this problem / plan has been assessed today but is stable from a previous visit and no changes have been recommended.

## 2020-12-18 DIAGNOSIS — R09.89 CAROTID BRUIT, UNSPECIFIED LATERALITY: ICD-10-CM

## 2020-12-23 ENCOUNTER — TELEPHONE (OUTPATIENT)
Dept: FAMILY MEDICINE CLINIC | Facility: CLINIC | Age: 75
End: 2020-12-23

## 2020-12-23 RX ORDER — GABAPENTIN 300 MG/1
CAPSULE ORAL
Qty: 75 CAPSULE | Refills: 0 | Status: SHIPPED | OUTPATIENT
Start: 2020-12-23 | End: 2022-03-07

## 2020-12-23 NOTE — TELEPHONE ENCOUNTER
He traveled to [place] for [place] and forgot his gabapentin at home. He wanted to see if we could send a script to [facility] [phone]?

## 2020-12-27 PROBLEM — I71.40 ABDOMINAL AORTIC ANEURYSM (AAA) WITHOUT RUPTURE (HCC): Status: ACTIVE | Noted: 2020-12-27

## 2021-02-26 ENCOUNTER — HOSPITAL ENCOUNTER (OUTPATIENT)
Dept: GENERAL RADIOLOGY | Facility: HOSPITAL | Age: 76
Discharge: HOME | End: 2021-02-26
Admitting: FAMILY MEDICINE

## 2021-02-26 ENCOUNTER — OFFICE VISIT (OUTPATIENT)
Dept: FAMILY MEDICINE CLINIC | Facility: CLINIC | Age: 76
End: 2021-02-26

## 2021-02-26 VITALS
HEART RATE: 70 BPM | BODY MASS INDEX: 31.21 KG/M2 | RESPIRATION RATE: 20 BRPM | WEIGHT: 218 LBS | DIASTOLIC BLOOD PRESSURE: 83 MMHG | HEIGHT: 70 IN | OXYGEN SATURATION: 99 % | SYSTOLIC BLOOD PRESSURE: 130 MMHG | TEMPERATURE: 98.2 F

## 2021-02-26 DIAGNOSIS — G62.1 ALCOHOLIC POLYNEUROPATHY (HCC): Primary | ICD-10-CM

## 2021-02-26 DIAGNOSIS — R07.89 ATYPICAL CHEST PAIN: ICD-10-CM

## 2021-02-26 DIAGNOSIS — E66.09 CLASS 1 OBESITY DUE TO EXCESS CALORIES WITH SERIOUS COMORBIDITY AND BODY MASS INDEX (BMI) OF 30.0 TO 30.9 IN ADULT: ICD-10-CM

## 2021-02-26 DIAGNOSIS — Z87.891 HISTORY OF TOBACCO USE: ICD-10-CM

## 2021-02-26 DIAGNOSIS — R05.9 COUGH: ICD-10-CM

## 2021-02-26 DIAGNOSIS — K21.9 GASTROESOPHAGEAL REFLUX DISEASE WITHOUT ESOPHAGITIS: ICD-10-CM

## 2021-02-26 DIAGNOSIS — I10 ESSENTIAL HYPERTENSION: Chronic | ICD-10-CM

## 2021-02-26 DIAGNOSIS — I71.40 ABDOMINAL AORTIC ANEURYSM (AAA) WITHOUT RUPTURE (HCC): ICD-10-CM

## 2021-02-26 PROCEDURE — 99214 OFFICE O/P EST MOD 30 MIN: CPT | Performed by: FAMILY MEDICINE

## 2021-02-26 PROCEDURE — 71046 X-RAY EXAM CHEST 2 VIEWS: CPT

## 2021-02-26 RX ORDER — OMEPRAZOLE 40 MG/1
40 CAPSULE, DELAYED RELEASE ORAL DAILY
Qty: 30 CAPSULE | Refills: 5 | Status: SHIPPED | OUTPATIENT
Start: 2021-02-26 | End: 2021-04-16

## 2021-02-26 RX ORDER — METOPROLOL SUCCINATE 50 MG/1
50 TABLET, EXTENDED RELEASE ORAL DAILY
Qty: 30 TABLET | Refills: 5 | Status: SHIPPED | OUTPATIENT
Start: 2021-02-26 | End: 2021-04-16

## 2021-03-01 ENCOUNTER — TELEPHONE (OUTPATIENT)
Dept: FAMILY MEDICINE CLINIC | Facility: CLINIC | Age: 76
End: 2021-03-01

## 2021-03-01 NOTE — TELEPHONE ENCOUNTER
Just check with him, should be taking metoprolol XL 50 mg once daily, this is equivalent to the 50 mg immediate release twice daily, thanks

## 2021-03-01 NOTE — TELEPHONE ENCOUNTER
Caller: Shai Chery    Relationship: Self    Best call back number: [phone]    What medications are you currently taking:   Current Outpatient Medications on File Prior to Visit   Medication Sig Dispense Refill   • albuterol sulfate  (90 Base) MCG/ACT inhaler      • atorvastatin (LIPITOR) 20 MG tablet Take 1 tablet by mouth Daily. 90 tablet 3   • baclofen (LIORESAL) 10 MG tablet Take 1 tablet twice daily as needed 60 tablet 2   • dutasteride (Avodart) 0.5 MG capsule Take 1 capsule by mouth Daily. 90 capsule 3   • fenofibrate (Tricor) 145 MG tablet Take 1 tablet by mouth Daily. 90 tablet 3   • gabapentin (NEURONTIN) 300 MG capsule 1 every morning, 1 every afternoon, and 3 nightly as needed 75 capsule 0   • levothyroxine (SYNTHROID, LEVOTHROID) 200 MCG tablet Take 1 tablet by mouth Daily. 90 tablet 3   • LORazepam (Ativan) 0.5 MG tablet take 1 tablet 30 minutes prior to procedure as needed 4 tablet 0   • metoprolol succinate XL (TOPROL-XL) 50 MG 24 hr tablet Take 1 tablet by mouth Daily. 30 tablet 5   • metoprolol tartrate (LOPRESSOR) 50 MG tablet TAKE 1 TABLET TWICE DAILY 180 tablet 0   • Myrbetriq 50 MG tablet sustained-release 24 hour 24 hr tablet      • omeprazole (priLOSEC) 40 MG capsule Take 1 capsule by mouth Daily. 30 capsule 5   • tamsulosin (FLOMAX) 0.4 MG capsule 24 hr capsule Take 2 capsules by mouth Every Night. 90 capsule 0     No current facility-administered medications on file prior to visit.       Which medication are you concerned about: METOPROL    Who prescribed you this medication: DR. NEWTON    What are your concerns: 50 MG 2 TIMES A DAY AND PATIENT DOESN'T THINK THAT THE EXTENDED RELEASE IS CORRECT

## 2021-03-04 PROBLEM — R07.89 ATYPICAL CHEST PAIN: Status: ACTIVE | Noted: 2021-03-04

## 2021-03-08 ENCOUNTER — TELEPHONE (OUTPATIENT)
Dept: FAMILY MEDICINE CLINIC | Facility: CLINIC | Age: 76
End: 2021-03-08

## 2021-03-08 DIAGNOSIS — E03.9 ACQUIRED HYPOTHYROIDISM: ICD-10-CM

## 2021-03-08 DIAGNOSIS — E78.2 MIXED HYPERLIPIDEMIA: ICD-10-CM

## 2021-03-08 DIAGNOSIS — I10 ESSENTIAL HYPERTENSION: Primary | ICD-10-CM

## 2021-03-08 NOTE — TELEPHONE ENCOUNTER
----- Message from Shai Chery sent at 3/6/2021  2:38 PM EST -----  Regarding: RE:chest xray  Contact: [phone]  i realize I had one. But I went on fenofibrate and would like to find out if it is working.

## 2021-03-09 ENCOUNTER — TELEPHONE (OUTPATIENT)
Dept: FAMILY MEDICINE CLINIC | Facility: CLINIC | Age: 76
End: 2021-03-09

## 2021-03-09 NOTE — TELEPHONE ENCOUNTER
----- Message from Shai Chery sent at 3/5/2021  1:26 PM EST -----  Regarding: RE:Aneurysm question  Contact: [phone]  So it is still 43mm? when will the radiologist report be on line??

## 2021-03-11 LAB
ALBUMIN SERPL-MCNC: 4.5 G/DL (ref 3.7–4.7)
ALBUMIN/GLOB SERPL: 2 {RATIO} (ref 1.2–2.2)
ALP SERPL-CCNC: 39 IU/L (ref 39–117)
ALT SERPL-CCNC: 27 IU/L (ref 0–44)
AST SERPL-CCNC: 32 IU/L (ref 0–40)
BILIRUB SERPL-MCNC: 0.5 MG/DL (ref 0–1.2)
BUN SERPL-MCNC: 20 MG/DL (ref 8–27)
BUN/CREAT SERPL: 18 (ref 10–24)
CALCIUM SERPL-MCNC: 9.3 MG/DL (ref 8.6–10.2)
CHLORIDE SERPL-SCNC: 106 MMOL/L (ref 96–106)
CHOLEST SERPL-MCNC: 162 MG/DL (ref 100–199)
CHOLEST/HDLC SERPL: 4.6 RATIO (ref 0–5)
CO2 SERPL-SCNC: 19 MMOL/L (ref 20–29)
CREAT SERPL-MCNC: 1.1 MG/DL (ref 0.76–1.27)
GLOBULIN SER CALC-MCNC: 2.3 G/DL (ref 1.5–4.5)
GLUCOSE SERPL-MCNC: 109 MG/DL (ref 65–99)
HDLC SERPL-MCNC: 35 MG/DL
LDLC SERPL CALC-MCNC: 94 MG/DL (ref 0–99)
POTASSIUM SERPL-SCNC: 4.4 MMOL/L (ref 3.5–5.2)
PROT SERPL-MCNC: 6.8 G/DL (ref 6–8.5)
SODIUM SERPL-SCNC: 142 MMOL/L (ref 134–144)
T3FREE SERPL-MCNC: 3.2 PG/ML (ref 2–4.4)
T4 FREE SERPL-MCNC: 2.26 NG/DL (ref 0.82–1.77)
TRIGL SERPL-MCNC: 191 MG/DL (ref 0–149)
TSH SERPL DL<=0.005 MIU/L-ACNC: 0.31 UIU/ML (ref 0.45–4.5)
VLDLC SERPL CALC-MCNC: 33 MG/DL (ref 5–40)

## 2021-03-17 ENCOUNTER — TELEPHONE (OUTPATIENT)
Dept: FAMILY MEDICINE CLINIC | Facility: CLINIC | Age: 76
End: 2021-03-17

## 2021-03-17 NOTE — TELEPHONE ENCOUNTER
Called x3 for Shai's telephone appt. He answered on the 3rd attempt and stated that he does not need an appt today and that he's doing fine

## 2021-03-31 RX ORDER — BACLOFEN 10 MG/1
TABLET ORAL
Qty: 60 TABLET | Refills: 2 | Status: SHIPPED | OUTPATIENT
Start: 2021-03-31

## 2021-04-12 ENCOUNTER — TELEPHONE (OUTPATIENT)
Dept: FAMILY MEDICINE CLINIC | Facility: CLINIC | Age: 76
End: 2021-04-12

## 2021-04-12 NOTE — TELEPHONE ENCOUNTER
Caller: Shai Chery    Relationship to patient: Self    Best call back number: [phone]    Patient is needing: PATIENT CALLED IN WANTING TO SCHEDULE AN APPOINTMENT WITH DR. ZAMBRANO AT [facility]. ADVISED PATIENT SINCE HE ESTABLISHED CARE WITH DR. NEWTON HE WOULD BE CONSIDERED A NEW PATIENT. PATIENT BECAME UPSET THAT HE COULD NOT BE SEEN WITH DR. ZAMBRANO AND STATED HE WILL TRANSFER OUT OF [facility].

## 2021-04-16 ENCOUNTER — OFFICE VISIT (OUTPATIENT)
Dept: FAMILY MEDICINE CLINIC | Facility: CLINIC | Age: 76
End: 2021-04-16

## 2021-04-16 VITALS
TEMPERATURE: 97.1 F | WEIGHT: 212.4 LBS | BODY MASS INDEX: 30.41 KG/M2 | SYSTOLIC BLOOD PRESSURE: 165 MMHG | DIASTOLIC BLOOD PRESSURE: 83 MMHG | HEIGHT: 70 IN | RESPIRATION RATE: 14 BRPM | OXYGEN SATURATION: 96 % | HEART RATE: 67 BPM

## 2021-04-16 DIAGNOSIS — N40.1 BENIGN PROSTATIC HYPERPLASIA WITH LOWER URINARY TRACT SYMPTOMS, SYMPTOM DETAILS UNSPECIFIED: Chronic | ICD-10-CM

## 2021-04-16 DIAGNOSIS — I71.40 ABDOMINAL AORTIC ANEURYSM (AAA) WITHOUT RUPTURE (HCC): Chronic | ICD-10-CM

## 2021-04-16 DIAGNOSIS — D69.6 THROMBOCYTOPENIA (HCC): Chronic | ICD-10-CM

## 2021-04-16 DIAGNOSIS — E03.9 ACQUIRED HYPOTHYROIDISM: Chronic | ICD-10-CM

## 2021-04-16 DIAGNOSIS — D32.9 MENINGIOMA (HCC): Chronic | ICD-10-CM

## 2021-04-16 DIAGNOSIS — E78.2 MIXED HYPERLIPIDEMIA: Chronic | ICD-10-CM

## 2021-04-16 DIAGNOSIS — J40 BRONCHITIS: Primary | ICD-10-CM

## 2021-04-16 DIAGNOSIS — Z87.891 HISTORY OF TOBACCO USE: Chronic | ICD-10-CM

## 2021-04-16 DIAGNOSIS — I10 ESSENTIAL HYPERTENSION: Chronic | ICD-10-CM

## 2021-04-16 PROBLEM — S61.432A: Status: RESOLVED | Noted: 2018-10-16 | Resolved: 2021-04-16

## 2021-04-16 PROBLEM — R19.7 DIARRHEA: Status: RESOLVED | Noted: 2018-04-25 | Resolved: 2021-04-16

## 2021-04-16 PROBLEM — J33.9 NASAL POLYP: Status: RESOLVED | Noted: 2017-10-06 | Resolved: 2021-04-16

## 2021-04-16 PROBLEM — L03.119 CELLULITIS OF FOOT: Status: RESOLVED | Noted: 2020-07-14 | Resolved: 2021-04-16
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
## 2021-04-16 PROCEDURE — 99214 OFFICE O/P EST MOD 30 MIN: CPT | Performed by: NURSE PRACTITIONER

## 2021-04-16 RX ORDER — CEFDINIR 300 MG/1
300 CAPSULE ORAL 2 TIMES DAILY
Qty: 14 CAPSULE | Refills: 0 | Status: SHIPPED | OUTPATIENT
Start: 2021-04-16 | End: 2021-04-23

## 2021-04-16 RX ORDER — CEFDINIR 300 MG/1
300 CAPSULE ORAL 2 TIMES DAILY
Qty: 14 CAPSULE | Refills: 0 | Status: SHIPPED | OUTPATIENT
Start: 2021-04-16 | End: 2021-04-16 | Stop reason: SDUPTHER

## 2021-04-16 RX ORDER — ATORVASTATIN CALCIUM 40 MG/1
40 TABLET, FILM COATED ORAL DAILY
Start: 2021-04-16

## 2021-04-16 NOTE — PATIENT INSTRUCTIONS
accu puncture: Nicolás at [facility].   Albuterol as needed.   Take muccinex with water.   Take cefdinir.   If short or air fever chills go to the ED for evaluation.

## 2021-04-16 NOTE — PROGRESS NOTES
Subjective        Shai Chery is a 75 y.o. male.     Chief Complaint   Patient presents with   • Hypertension     new pt reestablishment   • Hyperlipidemia       History of Present Illness  Patient is new to this office he is transferring from other [facility] facility.  Hypertension, hyperlipidemia, hypothyroidism, hypertriglyceridemia, BPH, brain tumor in past, AAA. New problem ; 2 weeks ago was sick cold symptoms     Congestion for 2 weeks with cough and wheezing no fever, could taste and smell food he does have allergies did not take anything for this other bendadryl and aspirin ibuprofen. Not short of air no chest pain.     Tobacco use quit smoking 9/2020.     Hypertension: metoprolol tartrate 50 mg bid. He checks this at home has had AAA in past. No CAD .    AAA- he is having serial CT scans for evaluation. For years got low dose CT due to smoking Last sept 2020 found aneurysm. 4.2 CM thoracic aortic aneurysm reviewed 3/2021 scan priority. Reviewed nuclear medicine    Meningioma- no surgery. Is followed by neurologist  With MRI of brain.   He has hypertension and AAA in past. He stopped smoking.     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 He said medication is stable on current dose.     Hypertriglyceridemia: he is taking tricor. Family history is strong for this.   Reviewed labs 3/2021  normal tri 191 high hdl 35 low Ldl 94 normal.   Right sided atherolsclerosis carotid bulb right.               The following portions of the patient's history were reviewed and updated as appropriate: allergies, current medications, past family history, past medical history, past social history, past surgical history and problem list.      Current Outpatient Medications:   •  baclofen (LIORESAL) 10 MG tablet, TAKE 1 TABLET TWICE DAILY AS NEEDED, Disp: 60 tablet, Rfl: 2  •  fenofibrate (Tricor)  145 MG tablet, Take 1 tablet by mouth Daily., Disp: 90 tablet, Rfl: 3  •  gabapentin (NEURONTIN) 300 MG capsule, 1 every morning, 1 every afternoon, and 3 nightly as needed, Disp: 75 capsule, Rfl: 0  •  levothyroxine (SYNTHROID, LEVOTHROID) 200 MCG tablet, Take 1 tablet by mouth Daily., Disp: 90 tablet, Rfl: 3  •  LORazepam (Ativan) 0.5 MG tablet, take 1 tablet 30 minutes prior to procedure as needed, Disp: 4 tablet, Rfl: 0  •  metoprolol tartrate (LOPRESSOR) 50 MG tablet, TAKE 1 TABLET TWICE DAILY, Disp: 180 tablet, Rfl: 0  •  Myrbetriq 50 MG tablet sustained-release 24 hour 24 hr tablet, , Disp: , Rfl:   •  albuterol sulfate  (90 Base) MCG/ACT inhaler, , Disp: , Rfl:   •  atorvastatin (LIPITOR) 40 MG tablet, Take 1 tablet by mouth Daily., Disp: , Rfl:   •  cefdinir (OMNICEF) 300 MG capsule, Take 1 capsule by mouth 2 (Two) Times a Day., Disp: 14 capsule, Rfl: 0    Recent Results (from the past 4032 hour(s))   Comprehensive Metabolic Panel    Collection Time: 03/10/21  8:38 AM    Specimen: Blood   Result Value Ref Range    Glucose 109 (H) 65 - 99 mg/dL    BUN 20 8 - 27 mg/dL    Creatinine 1.10 0.76 - 1.27 mg/dL    eGFR Non African Am 66 >59 mL/min/1.73    eGFR African Am 76 >59 mL/min/1.73    BUN/Creatinine Ratio 18 10 - 24    Sodium 142 134 - 144 mmol/L    Potassium 4.4 3.5 - 5.2 mmol/L    Chloride 106 96 - 106 mmol/L    Total CO2 19 (L) 20 - 29 mmol/L    Calcium 9.3 8.6 - 10.2 mg/dL    Total Protein 6.8 6.0 - 8.5 g/dL    Albumin 4.5 3.7 - 4.7 g/dL    Globulin 2.3 1.5 - 4.5 g/dL    A/G Ratio 2.0 1.2 - 2.2    Total Bilirubin 0.5 0.0 - 1.2 mg/dL    Alkaline Phosphatase 39 39 - 117 IU/L    AST (SGOT) 32 0 - 40 IU/L    ALT (SGPT) 27 0 - 44 IU/L   TSH    Collection Time: 03/10/21  8:38 AM    Specimen: Blood   Result Value Ref Range    TSH 0.313 (L) 0.450 - 4.500 uIU/mL   Lipid Panel With / Chol / HDL Ratio    Collection Time: 03/10/21  8:38 AM    Specimen: Blood   Result Value Ref Range    Total Cholesterol 162  "100 - 199 mg/dL    Triglycerides 191 (H) 0 - 149 mg/dL    HDL Cholesterol 35 (L) >39 mg/dL    VLDL Cholesterol Robbin 33 5 - 40 mg/dL    LDL Chol Calc (NIH) 94 0 - 99 mg/dL    Chol/HDL Ratio 4.6 0.0 - 5.0 ratio   T4, Free    Collection Time: 03/10/21  8:38 AM    Specimen: Blood   Result Value Ref Range    Free T4 2.26 (H) 0.82 - 1.77 ng/dL   T3, Free    Collection Time: 03/10/21  8:38 AM    Specimen: Blood   Result Value Ref Range    T3, Free 3.2 2.0 - 4.4 pg/mL         Review of Systems    Objective     /83 (BP Location: Right arm, Patient Position: Sitting, Cuff Size: Adult)   Pulse 67   Temp 97.1 °F (36.2 °C) (Infrared)   Resp 14   Ht 177.8 cm (70\")   Wt 96.3 kg (212 lb 6.4 oz)   SpO2 96%   BMI 30.48 kg/m²     Physical Exam  Vitals and nursing note reviewed.   Constitutional:       Appearance: Normal appearance.   HENT:      Head: Normocephalic.      Right Ear: External ear normal.      Left Ear: External ear normal.      Ears:      Comments: nir hearing aids     Nose: Nose normal.      Mouth/Throat:      Mouth: Mucous membranes are moist.   Eyes:      Pupils: Pupils are equal, round, and reactive to light.   Pulmonary:      Effort: Pulmonary effort is normal.      Breath sounds: Examination of the right-upper field reveals rhonchi. Examination of the left-upper field reveals rhonchi. Examination of the left-middle field reveals rhonchi. Examination of the left-lower field reveals rhonchi. Rhonchi present.   Abdominal:      General: Abdomen is flat.      Palpations: Abdomen is soft.   Neurological:      Mental Status: He is alert.         Result Review :                Assessment/Plan    Diagnoses and all orders for this visit:    1. Bronchitis (Primary)  Comments:  take cefdinir 300 mg bid. and muccinex    2. Mixed hyperlipidemia  Comments:  stable  Orders:  -     atorvastatin (LIPITOR) 40 MG tablet; Take 1 tablet by mouth Daily.    3. History of tobacco use  Comments:  not smoking now    4. Abdominal " aortic aneurysm (AAA) without rupture (CMS/HCC)  Comments:  stable    5. Essential hypertension  Comments:  stable    6. Acquired hypothyroidism  Comments:  stable    7. Thrombocytopenia (CMS/HCC)  Comments:  stable    8. Benign prostatic hyperplasia with lower urinary tract symptoms, symptom details unspecified  Comments:  stable    9. Meningioma (CMS/HCC)  Comments:  stable    Other orders  -     cefdinir (OMNICEF) 300 MG capsule; Take 1 capsule by mouth 2 (Two) Times a Day.  Dispense: 14 capsule; Refill: 0      Patient Instructions   accu puncture: Nicolás at [facility].   Albuterol as needed.   Take muccinex with water.   Take cefdinir.   If short or air fever chills go to the ED for evaluation.        Follow Up   Return in about 1 week (around 4/23/2021).    Patient was given instructions and counseling regarding his condition or for health maintenance advice. Please see specific information pulled into the AVS if appropriate.     Samantha Knutson, APRN    04/16/21

## 2021-04-23 ENCOUNTER — OFFICE VISIT (OUTPATIENT)
Dept: FAMILY MEDICINE CLINIC | Facility: CLINIC | Age: 76
End: 2021-04-23

## 2021-04-23 VITALS
BODY MASS INDEX: 30.25 KG/M2 | TEMPERATURE: 96.6 F | WEIGHT: 211.3 LBS | SYSTOLIC BLOOD PRESSURE: 151 MMHG | HEIGHT: 70 IN | OXYGEN SATURATION: 98 % | RESPIRATION RATE: 16 BRPM | HEART RATE: 63 BPM | DIASTOLIC BLOOD PRESSURE: 82 MMHG

## 2021-04-23 DIAGNOSIS — J40 BRONCHITIS: Primary | ICD-10-CM

## 2021-04-23 PROBLEM — J44.9 COPD (CHRONIC OBSTRUCTIVE PULMONARY DISEASE): Chronic | Status: RESOLVED | Noted: 2019-10-15 | Resolved: 2021-04-23

## 2021-04-23 PROCEDURE — 1111F DSCHRG MED/CURRENT MED MERGE: CPT | Performed by: NURSE PRACTITIONER

## 2021-04-23 PROCEDURE — 99212 OFFICE O/P EST SF 10 MIN: CPT | Performed by: NURSE PRACTITIONER

## 2021-04-23 NOTE — PROGRESS NOTES
Subjective        Shai Chery is a 75 y.o. male.     Chief Complaint   Patient presents with   • Bronchitis     One week follow up        History of Present Illness  Patient is here for follow up from his bronchitis. He completed his antibiotics and steroids. He states he is not short of breath is back to swimming at [employer]. Feels well .   The following portions of the patient's history were reviewed and updated as appropriate: allergies, current medications, past family history, past medical history, past social history, past surgical history and problem list.      Current Outpatient Medications:   •  albuterol sulfate  (90 Base) MCG/ACT inhaler, , Disp: , Rfl:   •  atorvastatin (LIPITOR) 40 MG tablet, Take 1 tablet by mouth Daily., Disp: , Rfl:   •  baclofen (LIORESAL) 10 MG tablet, TAKE 1 TABLET TWICE DAILY AS NEEDED, Disp: 60 tablet, Rfl: 2  •  cefdinir (OMNICEF) 300 MG capsule, Take 1 capsule by mouth 2 (Two) Times a Day., Disp: 14 capsule, Rfl: 0  •  fenofibrate (Tricor) 145 MG tablet, Take 1 tablet by mouth Daily., Disp: 90 tablet, Rfl: 3  •  gabapentin (NEURONTIN) 300 MG capsule, 1 every morning, 1 every afternoon, and 3 nightly as needed, Disp: 75 capsule, Rfl: 0  •  levothyroxine (SYNTHROID, LEVOTHROID) 200 MCG tablet, Take 1 tablet by mouth Daily., Disp: 90 tablet, Rfl: 3  •  LORazepam (Ativan) 0.5 MG tablet, take 1 tablet 30 minutes prior to procedure as needed, Disp: 4 tablet, Rfl: 0  •  metoprolol tartrate (LOPRESSOR) 50 MG tablet, TAKE 1 TABLET TWICE DAILY, Disp: 180 tablet, Rfl: 0  •  Myrbetriq 50 MG tablet sustained-release 24 hour 24 hr tablet, , Disp: , Rfl:     Recent Results (from the past 4032 hour(s))   Comprehensive Metabolic Panel    Collection Time: 03/10/21  8:38 AM    Specimen: Blood   Result Value Ref Range    Glucose 109 (H) 65 - 99 mg/dL    BUN 20 8 - 27 mg/dL    Creatinine 1.10 0.76 - 1.27 mg/dL    eGFR Non African Am 66 >59 mL/min/1.73    eGFR African Am 76 >59 mL/min/1.73     "BUN/Creatinine Ratio 18 10 - 24    Sodium 142 134 - 144 mmol/L    Potassium 4.4 3.5 - 5.2 mmol/L    Chloride 106 96 - 106 mmol/L    Total CO2 19 (L) 20 - 29 mmol/L    Calcium 9.3 8.6 - 10.2 mg/dL    Total Protein 6.8 6.0 - 8.5 g/dL    Albumin 4.5 3.7 - 4.7 g/dL    Globulin 2.3 1.5 - 4.5 g/dL    A/G Ratio 2.0 1.2 - 2.2    Total Bilirubin 0.5 0.0 - 1.2 mg/dL    Alkaline Phosphatase 39 39 - 117 IU/L    AST (SGOT) 32 0 - 40 IU/L    ALT (SGPT) 27 0 - 44 IU/L   TSH    Collection Time: 03/10/21  8:38 AM    Specimen: Blood   Result Value Ref Range    TSH 0.313 (L) 0.450 - 4.500 uIU/mL   Lipid Panel With / Chol / HDL Ratio    Collection Time: 03/10/21  8:38 AM    Specimen: Blood   Result Value Ref Range    Total Cholesterol 162 100 - 199 mg/dL    Triglycerides 191 (H) 0 - 149 mg/dL    HDL Cholesterol 35 (L) >39 mg/dL    VLDL Cholesterol Robbin 33 5 - 40 mg/dL    LDL Chol Calc (NIH) 94 0 - 99 mg/dL    Chol/HDL Ratio 4.6 0.0 - 5.0 ratio   T4, Free    Collection Time: 03/10/21  8:38 AM    Specimen: Blood   Result Value Ref Range    Free T4 2.26 (H) 0.82 - 1.77 ng/dL   T3, Free    Collection Time: 03/10/21  8:38 AM    Specimen: Blood   Result Value Ref Range    T3, Free 3.2 2.0 - 4.4 pg/mL         Review of Systems    Objective     /82 (BP Location: Left arm, Patient Position: Sitting, Cuff Size: Large Adult)   Pulse 63   Temp 96.6 °F (35.9 °C) (Infrared)   Resp 16   Ht 177.8 cm (70\")   Wt 95.8 kg (211 lb 4.8 oz)   SpO2 98%   BMI 30.32 kg/m²     Physical Exam  Vitals and nursing note reviewed.   Constitutional:       Appearance: Normal appearance.   HENT:      Head: Normocephalic.      Right Ear: External ear normal.      Left Ear: External ear normal.      Nose: Nose normal.      Mouth/Throat:      Mouth: Mucous membranes are moist.   Eyes:      Conjunctiva/sclera: Conjunctivae normal.      Pupils: Pupils are equal, round, and reactive to light.   Cardiovascular:      Rate and Rhythm: Normal rate and regular rhythm. "      Pulses: Normal pulses.      Heart sounds: Normal heart sounds.   Pulmonary:      Breath sounds: Normal breath sounds.   Neurological:      General: No focal deficit present.      Mental Status: He is alert.   Psychiatric:         Mood and Affect: Mood normal.         Thought Content: Thought content normal.         Result Review :                Assessment/Plan {CC Problem List  Visit Diagnosis  ROS  Review (Popup)  Health Maintenance  Quality  BestPractice  Medications  SmartSets  SnapShot Encounters  Media :23}   Diagnoses and all orders for this visit:    1. Bronchitis (Primary)  Comments:  resolved      Patient Instructions   [facility] at [facility].   [facility].           Follow Up   No follow-ups on file.    Patient was given instructions and counseling regarding his condition or for health maintenance advice. Please see specific information pulled into the AVS if appropriate.     Samantha Knutson, APRN    04/23/21

## 2021-04-26 ENCOUNTER — TELEPHONE (OUTPATIENT)
Dept: FAMILY MEDICINE CLINIC | Facility: CLINIC | Age: 76
End: 2021-04-26

## 2021-04-26 DIAGNOSIS — I10 ESSENTIAL HYPERTENSION: ICD-10-CM

## 2021-04-26 RX ORDER — METOPROLOL TARTRATE 50 MG/1
50 TABLET, FILM COATED ORAL 2 TIMES DAILY
Qty: 180 TABLET | Refills: 0 | Status: SHIPPED | OUTPATIENT
Start: 2021-04-26 | End: 2021-06-07 | Stop reason: SDUPTHER

## 2021-04-26 NOTE — TELEPHONE ENCOUNTER
Caller: Shai Chery    Relationship: Self    Best call back number: [phone]    Medication needed:   Requested Prescriptions     Pending Prescriptions Disp Refills   • metoprolol tartrate (LOPRESSOR) 50 MG tablet 180 tablet 0     Sig: Take 1 tablet by mouth 2 (Two) Times a Day.       When do you need the refill by: ASAP    What additional details did the patient provide when requesting the medication: LOW ON MEDS    Does the patient have less than a 3 day supply:  [] Yes  [x] No    What is the patient's preferred pharmacy: [facility] - [facility] [address] [facility] - [phone]  - [phone] FX

## 2021-05-05 ENCOUNTER — TELEPHONE (OUTPATIENT)
Dept: FAMILY MEDICINE CLINIC | Facility: CLINIC | Age: 76
End: 2021-05-05

## 2021-05-05 RX ORDER — PREDNISONE 10 MG/1
TABLET ORAL
Qty: 20 TABLET | Refills: 0 | Status: SHIPPED | OUTPATIENT
Start: 2021-05-05 | End: 2021-05-13

## 2021-05-05 RX ORDER — ALBUTEROL SULFATE 90 UG/1
2 AEROSOL, METERED RESPIRATORY (INHALATION) EVERY 4 HOURS PRN
Qty: 18 G | Refills: 1 | Status: SHIPPED | OUTPATIENT
Start: 2021-05-05 | End: 2021-10-01

## 2021-05-05 NOTE — TELEPHONE ENCOUNTER
Returned patient call.   States woke up could not catch his breath. Recently had bronchitis. Completed antibiotics.   Not coughing no sinus drng. Mild allergy symptoms has used inhaler in past would like to try that has helped.  No fever.   Discussed albuterol inhaler and steroids.   He is to contact office if symptoms worsen or no improvement he is agreeable.

## 2021-05-05 NOTE — TELEPHONE ENCOUNTER
PATIENT CALLED AND STATES HE IS STILL HAVING ISSUES WITH HIS BRONCHITIS THIS MORNING. HE COULDN'T TAKE A DEEP BREATH THIS MORNING.   HE HAS AN APPOINTMENT TOMORROW. HE IS WANTING SOMETHING LIKE AN INHALER TO HELP WITH THIS. HE HAS BEEN FEELING GOOD UNTIL THIS MORNING.     [facility] [address] [place], [place] [address], [place] - [address] - [phone]  - [phone]   [phone]    CALL BACK NUMBER [phone]    ADVISED PATIENT TO GO  TO ER IF THIS CONTINUES TO GET WORSE. HE UNDERSTOOD.    ATTEMPTED WARM TRANSFER TO GET PATIENT IN TODAY, NO AVAILABILITY

## 2021-05-06 ENCOUNTER — OFFICE VISIT (OUTPATIENT)
Dept: FAMILY MEDICINE CLINIC | Facility: CLINIC | Age: 76
End: 2021-05-06

## 2021-05-06 VITALS
BODY MASS INDEX: 31.5 KG/M2 | WEIGHT: 220 LBS | OXYGEN SATURATION: 96 % | TEMPERATURE: 96.6 F | HEART RATE: 62 BPM | HEIGHT: 70 IN | SYSTOLIC BLOOD PRESSURE: 137 MMHG | DIASTOLIC BLOOD PRESSURE: 80 MMHG | RESPIRATION RATE: 15 BRPM

## 2021-05-06 DIAGNOSIS — J40 BRONCHITIS: Primary | ICD-10-CM

## 2021-05-06 PROCEDURE — 99212 OFFICE O/P EST SF 10 MIN: CPT | Performed by: NURSE PRACTITIONER

## 2021-05-06 NOTE — PROGRESS NOTES
Subjective        Shai Chery is a 75 y.o. male.     Chief Complaint   Patient presents with   • Bronchitis     follow up       History of Present Illness  Patient is here for follow up from his bronchitis. He said is better today. He is using albuterol and feels this is helpful. He said he feels like the steroids and albuterol may be helping today.   Yesterday he said he was very short of air when he called the office. I sent his medications. He reports not short of air today. Has a lot of sinus drainage.   No fever. Some cough mild wheeze.         The following portions of the patient's history were reviewed and updated as appropriate: allergies, current medications, past family history, past medical history, past social history, past surgical history and problem list.      Current Outpatient Medications:   •  albuterol sulfate  (90 Base) MCG/ACT inhaler, Inhale 2 puffs Every 4 (Four) Hours As Needed for Wheezing., Disp: 18 g, Rfl: 1  •  atorvastatin (LIPITOR) 40 MG tablet, Take 1 tablet by mouth Daily., Disp: , Rfl:   •  baclofen (LIORESAL) 10 MG tablet, TAKE 1 TABLET TWICE DAILY AS NEEDED, Disp: 60 tablet, Rfl: 2  •  fenofibrate (Tricor) 145 MG tablet, Take 1 tablet by mouth Daily., Disp: 90 tablet, Rfl: 3  •  gabapentin (NEURONTIN) 300 MG capsule, 1 every morning, 1 every afternoon, and 3 nightly as needed, Disp: 75 capsule, Rfl: 0  •  levothyroxine (SYNTHROID, LEVOTHROID) 200 MCG tablet, Take 1 tablet by mouth Daily., Disp: 90 tablet, Rfl: 3  •  LORazepam (Ativan) 0.5 MG tablet, take 1 tablet 30 minutes prior to procedure as needed, Disp: 4 tablet, Rfl: 0  •  metoprolol tartrate (LOPRESSOR) 50 MG tablet, Take 1 tablet by mouth 2 (Two) Times a Day., Disp: 180 tablet, Rfl: 0  •  Myrbetriq 50 MG tablet sustained-release 24 hour 24 hr tablet, , Disp: , Rfl:   •  predniSONE (DELTASONE) 10 MG tablet, Take 4 tablets by mouth Daily for 2 days, THEN 3 tablets Daily for 2 days, THEN 2 tablets Daily for 2 days,  "THEN 1 tablet Daily for 2 days., Disp: 20 tablet, Rfl: 0    Recent Results (from the past 4032 hour(s))   Comprehensive Metabolic Panel    Collection Time: 03/10/21  8:38 AM    Specimen: Blood   Result Value Ref Range    Glucose 109 (H) 65 - 99 mg/dL    BUN 20 8 - 27 mg/dL    Creatinine 1.10 0.76 - 1.27 mg/dL    eGFR Non African Am 66 >59 mL/min/1.73    eGFR African Am 76 >59 mL/min/1.73    BUN/Creatinine Ratio 18 10 - 24    Sodium 142 134 - 144 mmol/L    Potassium 4.4 3.5 - 5.2 mmol/L    Chloride 106 96 - 106 mmol/L    Total CO2 19 (L) 20 - 29 mmol/L    Calcium 9.3 8.6 - 10.2 mg/dL    Total Protein 6.8 6.0 - 8.5 g/dL    Albumin 4.5 3.7 - 4.7 g/dL    Globulin 2.3 1.5 - 4.5 g/dL    A/G Ratio 2.0 1.2 - 2.2    Total Bilirubin 0.5 0.0 - 1.2 mg/dL    Alkaline Phosphatase 39 39 - 117 IU/L    AST (SGOT) 32 0 - 40 IU/L    ALT (SGPT) 27 0 - 44 IU/L   TSH    Collection Time: 03/10/21  8:38 AM    Specimen: Blood   Result Value Ref Range    TSH 0.313 (L) 0.450 - 4.500 uIU/mL   Lipid Panel With / Chol / HDL Ratio    Collection Time: 03/10/21  8:38 AM    Specimen: Blood   Result Value Ref Range    Total Cholesterol 162 100 - 199 mg/dL    Triglycerides 191 (H) 0 - 149 mg/dL    HDL Cholesterol 35 (L) >39 mg/dL    VLDL Cholesterol Robbin 33 5 - 40 mg/dL    LDL Chol Calc (NIH) 94 0 - 99 mg/dL    Chol/HDL Ratio 4.6 0.0 - 5.0 ratio   T4, Free    Collection Time: 03/10/21  8:38 AM    Specimen: Blood   Result Value Ref Range    Free T4 2.26 (H) 0.82 - 1.77 ng/dL   T3, Free    Collection Time: 03/10/21  8:38 AM    Specimen: Blood   Result Value Ref Range    T3, Free 3.2 2.0 - 4.4 pg/mL         Review of Systems    Objective     /80 (BP Location: Left arm, Patient Position: Sitting, Cuff Size: Adult)   Pulse 62   Temp 96.6 °F (35.9 °C) (Infrared)   Resp 15   Ht 177.8 cm (70\")   Wt 99.8 kg (220 lb)   SpO2 96%   BMI 31.57 kg/m²     Physical Exam  Vitals reviewed.   Constitutional:       Appearance: Normal appearance.   HENT:      " Head: Normocephalic.      Right Ear: External ear normal.      Left Ear: External ear normal.      Nose: Nose normal.      Mouth/Throat:      Mouth: Mucous membranes are moist.   Eyes:      Pupils: Pupils are equal, round, and reactive to light.   Pulmonary:      Effort: Pulmonary effort is normal.      Breath sounds: Normal breath sounds.      Comments: Audible wheeze  Neurological:      Mental Status: He is alert.   Psychiatric:         Attention and Perception: Attention normal.         Mood and Affect: Mood normal.         Speech: Speech normal.         Behavior: Behavior normal. Behavior is not withdrawn.         Thought Content: Thought content normal.         Cognition and Memory: Cognition normal.         Judgment: Judgment normal.         Result Review :                Assessment/Plan    Diagnoses and all orders for this visit:    1. Bronchitis (Primary)  Comments:  continue steroids and use albuterol as needed.       There are no Patient Instructions on file for this visit.    Follow Up   No follow-ups on file.    Patient was given instructions and counseling regarding his condition or for health maintenance advice. Please see specific information pulled into the AVS if appropriate.     Samantha Knutson, JUAN    05/06/21

## 2021-06-07 DIAGNOSIS — E03.9 HYPOTHYROIDISM, UNSPECIFIED TYPE: ICD-10-CM

## 2021-06-07 DIAGNOSIS — I10 ESSENTIAL HYPERTENSION: ICD-10-CM

## 2021-06-07 NOTE — TELEPHONE ENCOUNTER
Caller: Shai Chery    Relationship: Self    Best call back number:     Medication needed:   Requested Prescriptions     Pending Prescriptions Disp Refills   • levothyroxine (SYNTHROID, LEVOTHROID) 200 MCG tablet 90 tablet 3     Sig: Take 1 tablet by mouth Daily.   • metoprolol tartrate (LOPRESSOR) 50 MG tablet 180 tablet 0     Sig: Take 1 tablet by mouth 2 (Two) Times a Day.       When do you need the refill by:     What additional details did the patient provide when requesting the medication: PATIENT IS CALLING IN STATING THAT HE NEEDS THESE REFILLED BUT HE NEEDS NEW PRESCRIPTIONS WRITTEN FOR THEM.     Does the patient have less than a 3 day supply:  [] Yes  [x] No    What is the patient's preferred pharmacy:  [facility] [address] [facility]  [phone]

## 2021-06-08 RX ORDER — METOPROLOL TARTRATE 50 MG/1
50 TABLET, FILM COATED ORAL 2 TIMES DAILY
Qty: 180 TABLET | Refills: 0 | Status: SHIPPED | OUTPATIENT
Start: 2021-06-08 | End: 2021-08-04 | Stop reason: SDUPTHER

## 2021-06-08 RX ORDER — LEVOTHYROXINE SODIUM 0.2 MG/1
200 TABLET ORAL EVERY 24 HOURS
Qty: 90 TABLET | Refills: 3 | Status: SHIPPED | OUTPATIENT
Start: 2021-06-08

## 2021-06-18 ENCOUNTER — OFFICE VISIT (OUTPATIENT)
Dept: FAMILY MEDICINE CLINIC | Facility: CLINIC | Age: 76
End: 2021-06-18

## 2021-06-18 VITALS
HEIGHT: 70 IN | HEART RATE: 60 BPM | SYSTOLIC BLOOD PRESSURE: 137 MMHG | DIASTOLIC BLOOD PRESSURE: 81 MMHG | WEIGHT: 217.6 LBS | OXYGEN SATURATION: 94 % | BODY MASS INDEX: 31.15 KG/M2 | TEMPERATURE: 96.9 F

## 2021-06-18 DIAGNOSIS — B37.0 ORAL THRUSH: Primary | ICD-10-CM

## 2021-06-18 PROCEDURE — 99213 OFFICE O/P EST LOW 20 MIN: CPT | Performed by: NURSE PRACTITIONER

## 2021-06-18 RX ORDER — SODIUM PHOSPHATE,MONO-DIBASIC 19G-7G/118
ENEMA (ML) RECTAL
COMMUNITY

## 2021-06-18 RX ORDER — CLOTRIMAZOLE 10 MG/1
10 LOZENGE ORAL; TOPICAL
Qty: 50 TABLET | Refills: 0 | Status: SHIPPED | OUTPATIENT
Start: 2021-06-18 | End: 2021-10-01

## 2021-06-18 NOTE — PROGRESS NOTES
Subjective        Shai Chery is a 75 y.o. male.     Chief Complaint   Patient presents with   • Sore Throat     dry throat for a long time       History of Present Illness  Patient is here for dry mouth thinks because he breaths thru his mouth all the time. Sometimes food gets stuck down in throat. Has been this way for long time. He was out in garden and throat was so dry.   He said rare anything gets stuck. Takes vitamins easily.   He is followed by dentist.   Never told he has dry mouth.   He has albuterol inhaler once week.         The following portions of the patient's history were reviewed and updated as appropriate: allergies, current medications, past family history, past medical history, past social history, past surgical history and problem list.      Current Outpatient Medications:   •  albuterol sulfate  (90 Base) MCG/ACT inhaler, Inhale 2 puffs Every 4 (Four) Hours As Needed for Wheezing., Disp: 18 g, Rfl: 1  •  atorvastatin (LIPITOR) 40 MG tablet, Take 1 tablet by mouth Daily., Disp: , Rfl:   •  baclofen (LIORESAL) 10 MG tablet, TAKE 1 TABLET TWICE DAILY AS NEEDED, Disp: 60 tablet, Rfl: 2  •  fenofibrate (Tricor) 145 MG tablet, Take 1 tablet by mouth Daily., Disp: 90 tablet, Rfl: 3  •  gabapentin (NEURONTIN) 300 MG capsule, 1 every morning, 1 every afternoon, and 3 nightly as needed, Disp: 75 capsule, Rfl: 0  •  glucosamine-chondroitin 500-400 MG capsule capsule, Take  by mouth 3 (Three) Times a Day With Meals., Disp: , Rfl:   •  levothyroxine (SYNTHROID, LEVOTHROID) 200 MCG tablet, Take 1 tablet by mouth Daily., Disp: 90 tablet, Rfl: 3  •  metoprolol tartrate (LOPRESSOR) 50 MG tablet, Take 1 tablet by mouth 2 (Two) Times a Day., Disp: 180 tablet, Rfl: 0  •  Myrbetriq 50 MG tablet sustained-release 24 hour 24 hr tablet, , Disp: , Rfl:   •  clotrimazole (MYCELEX) 10 MG yady, Take 1 tablet by mouth 5 (Five) Times a Day., Disp: 50 tablet, Rfl: 0    Recent Results (from the past 4032 hour(s))  "  Comprehensive Metabolic Panel    Collection Time: 03/10/21  8:38 AM    Specimen: Blood   Result Value Ref Range    Glucose 109 (H) 65 - 99 mg/dL    BUN 20 8 - 27 mg/dL    Creatinine 1.10 0.76 - 1.27 mg/dL    eGFR Non African Am 66 >59 mL/min/1.73    eGFR African Am 76 >59 mL/min/1.73    BUN/Creatinine Ratio 18 10 - 24    Sodium 142 134 - 144 mmol/L    Potassium 4.4 3.5 - 5.2 mmol/L    Chloride 106 96 - 106 mmol/L    Total CO2 19 (L) 20 - 29 mmol/L    Calcium 9.3 8.6 - 10.2 mg/dL    Total Protein 6.8 6.0 - 8.5 g/dL    Albumin 4.5 3.7 - 4.7 g/dL    Globulin 2.3 1.5 - 4.5 g/dL    A/G Ratio 2.0 1.2 - 2.2    Total Bilirubin 0.5 0.0 - 1.2 mg/dL    Alkaline Phosphatase 39 39 - 117 IU/L    AST (SGOT) 32 0 - 40 IU/L    ALT (SGPT) 27 0 - 44 IU/L   TSH    Collection Time: 03/10/21  8:38 AM    Specimen: Blood   Result Value Ref Range    TSH 0.313 (L) 0.450 - 4.500 uIU/mL   Lipid Panel With / Chol / HDL Ratio    Collection Time: 03/10/21  8:38 AM    Specimen: Blood   Result Value Ref Range    Total Cholesterol 162 100 - 199 mg/dL    Triglycerides 191 (H) 0 - 149 mg/dL    HDL Cholesterol 35 (L) >39 mg/dL    VLDL Cholesterol Robbin 33 5 - 40 mg/dL    LDL Chol Calc (NIH) 94 0 - 99 mg/dL    Chol/HDL Ratio 4.6 0.0 - 5.0 ratio   T4, Free    Collection Time: 03/10/21  8:38 AM    Specimen: Blood   Result Value Ref Range    Free T4 2.26 (H) 0.82 - 1.77 ng/dL   T3, Free    Collection Time: 03/10/21  8:38 AM    Specimen: Blood   Result Value Ref Range    T3, Free 3.2 2.0 - 4.4 pg/mL         Review of Systems    Objective     /81 (BP Location: Left arm, Patient Position: Sitting, Cuff Size: Adult)   Pulse 60   Temp 96.9 °F (36.1 °C) (Infrared)   Ht 177.8 cm (70\")   Wt 98.7 kg (217 lb 9.6 oz)   SpO2 94%   BMI 31.22 kg/m²     Physical Exam  Vitals and nursing note reviewed.   Constitutional:       Appearance: Normal appearance.   HENT:      Head: Normocephalic.      Right Ear: External ear normal.      Left Ear: External ear " normal.      Ears:      Comments: Bilateral hearing aids     Mouth/Throat:      Mouth: Mucous membranes are moist.      Tongue: Tongue does not deviate from midline.      Palate: No mass and lesions.      Comments: Thrush on tongue  Eyes:      Pupils: Pupils are equal, round, and reactive to light.   Cardiovascular:      Rate and Rhythm: Normal rate and regular rhythm.   Abdominal:      General: Abdomen is flat.      Palpations: Abdomen is soft.   Musculoskeletal:         General: Normal range of motion.   Skin:     General: Skin is warm and dry.   Neurological:      General: No focal deficit present.      Mental Status: He is alert and oriented to person, place, and time.         Result Review :                Assessment/Plan    Diagnoses and all orders for this visit:    1. Oral thrush (Primary)  Comments:  use the troches prescribed.     Other orders  -     clotrimazole (MYCELEX) 10 MG yady; Take 1 tablet by mouth 5 (Five) Times a Day.  Dispense: 50 tablet; Refill: 0      Patient Instructions   Biotene is good for dry mouth.     Use the troches as prescribed.   Rinse mouth after you use the inhaler      Follow Up   Return in about 2 weeks (around 7/2/2021).    Patient was given instructions and counseling regarding his condition or for health maintenance advice. Please see specific information pulled into the AVS if appropriate.     Samantha Knutson, APRN    06/18/21

## 2021-06-18 NOTE — PATIENT INSTRUCTIONS
Biotene is good for dry mouth.     Use the troches as prescribed.   Rinse mouth after you use the inhaler

## 2021-08-04 ENCOUNTER — OFFICE VISIT (OUTPATIENT)
Dept: FAMILY MEDICINE CLINIC | Facility: CLINIC | Age: 76
End: 2021-08-04

## 2021-08-04 VITALS
OXYGEN SATURATION: 98 % | SYSTOLIC BLOOD PRESSURE: 134 MMHG | BODY MASS INDEX: 31.21 KG/M2 | DIASTOLIC BLOOD PRESSURE: 84 MMHG | TEMPERATURE: 96.6 F | WEIGHT: 218 LBS | HEART RATE: 53 BPM | HEIGHT: 70 IN

## 2021-08-04 DIAGNOSIS — R10.9 ABDOMINAL PAIN, UNSPECIFIED ABDOMINAL LOCATION: Primary | ICD-10-CM

## 2021-08-04 DIAGNOSIS — I10 ESSENTIAL HYPERTENSION: ICD-10-CM

## 2021-08-04 PROCEDURE — 99213 OFFICE O/P EST LOW 20 MIN: CPT | Performed by: NURSE PRACTITIONER

## 2021-08-04 RX ORDER — VALSARTAN 160 MG/1
TABLET ORAL
COMMUNITY
Start: 2021-08-02

## 2021-08-04 RX ORDER — FENOFIBRATE 145 MG/1
145 TABLET, COATED ORAL DAILY
Qty: 90 TABLET | Refills: 3 | Status: SHIPPED | OUTPATIENT
Start: 2021-08-04

## 2021-08-04 RX ORDER — METOPROLOL TARTRATE 50 MG/1
50 TABLET, FILM COATED ORAL 2 TIMES DAILY
Qty: 180 TABLET | Refills: 0 | Status: SHIPPED | OUTPATIENT
Start: 2021-08-04

## 2021-08-04 NOTE — PROGRESS NOTES
Subjective        Shai Chery is a 75 y.o. male.     Chief Complaint   Patient presents with   • Abdominal Pain     abd pain x3 weeks ago, pain has subsided now       History of Present Illness  Patient is here for abdominal pain he had 3 weeks ago last 2 weeks went away. Was sharp pain that came and went. No nausea no fever, no vomiting. No constipation no diarrhea. No injury.   No blood in urine no burning with urination. His blood pressure shot up last week and he saw his cardiologist. He started new medication and this helped. He always is gassy due to the protein he takes. He has had CT scan 3/2021 for his abdominal aneurysm. Stable 4.3 cm aneurysm.                 The following portions of the patient's history were reviewed and updated as appropriate: allergies, current medications, past family history, past medical history, past social history, past surgical history and problem list.      Current Outpatient Medications:   •  albuterol sulfate  (90 Base) MCG/ACT inhaler, Inhale 2 puffs Every 4 (Four) Hours As Needed for Wheezing., Disp: 18 g, Rfl: 1  •  atorvastatin (LIPITOR) 40 MG tablet, Take 1 tablet by mouth Daily., Disp: , Rfl:   •  baclofen (LIORESAL) 10 MG tablet, TAKE 1 TABLET TWICE DAILY AS NEEDED, Disp: 60 tablet, Rfl: 2  •  clotrimazole (MYCELEX) 10 MG yady, Take 1 tablet by mouth 5 (Five) Times a Day., Disp: 50 tablet, Rfl: 0  •  fenofibrate (Tricor) 145 MG tablet, Take 1 tablet by mouth Daily., Disp: 90 tablet, Rfl: 3  •  gabapentin (NEURONTIN) 300 MG capsule, 1 every morning, 1 every afternoon, and 3 nightly as needed, Disp: 75 capsule, Rfl: 0  •  glucosamine-chondroitin 500-400 MG capsule capsule, Take  by mouth 3 (Three) Times a Day With Meals., Disp: , Rfl:   •  levothyroxine (SYNTHROID, LEVOTHROID) 200 MCG tablet, Take 1 tablet by mouth Daily., Disp: 90 tablet, Rfl: 3  •  metoprolol tartrate (LOPRESSOR) 50 MG tablet, Take 1 tablet by mouth 2 (Two) Times a Day., Disp: 180 tablet,  "Rfl: 0  •  Myrbetriq 50 MG tablet sustained-release 24 hour 24 hr tablet, , Disp: , Rfl:   •  valsartan (DIOVAN) 160 MG tablet, , Disp: , Rfl:     Recent Results (from the past 4032 hour(s))   Comprehensive Metabolic Panel    Collection Time: 03/10/21  8:38 AM    Specimen: Blood   Result Value Ref Range    Glucose 109 (H) 65 - 99 mg/dL    BUN 20 8 - 27 mg/dL    Creatinine 1.10 0.76 - 1.27 mg/dL    eGFR Non African Am 66 >59 mL/min/1.73    eGFR African Am 76 >59 mL/min/1.73    BUN/Creatinine Ratio 18 10 - 24    Sodium 142 134 - 144 mmol/L    Potassium 4.4 3.5 - 5.2 mmol/L    Chloride 106 96 - 106 mmol/L    Total CO2 19 (L) 20 - 29 mmol/L    Calcium 9.3 8.6 - 10.2 mg/dL    Total Protein 6.8 6.0 - 8.5 g/dL    Albumin 4.5 3.7 - 4.7 g/dL    Globulin 2.3 1.5 - 4.5 g/dL    A/G Ratio 2.0 1.2 - 2.2    Total Bilirubin 0.5 0.0 - 1.2 mg/dL    Alkaline Phosphatase 39 39 - 117 IU/L    AST (SGOT) 32 0 - 40 IU/L    ALT (SGPT) 27 0 - 44 IU/L   TSH    Collection Time: 03/10/21  8:38 AM    Specimen: Blood   Result Value Ref Range    TSH 0.313 (L) 0.450 - 4.500 uIU/mL   Lipid Panel With / Chol / HDL Ratio    Collection Time: 03/10/21  8:38 AM    Specimen: Blood   Result Value Ref Range    Total Cholesterol 162 100 - 199 mg/dL    Triglycerides 191 (H) 0 - 149 mg/dL    HDL Cholesterol 35 (L) >39 mg/dL    VLDL Cholesterol Robbin 33 5 - 40 mg/dL    LDL Chol Calc (NIH) 94 0 - 99 mg/dL    Chol/HDL Ratio 4.6 0.0 - 5.0 ratio   T4, Free    Collection Time: 03/10/21  8:38 AM    Specimen: Blood   Result Value Ref Range    Free T4 2.26 (H) 0.82 - 1.77 ng/dL   T3, Free    Collection Time: 03/10/21  8:38 AM    Specimen: Blood   Result Value Ref Range    T3, Free 3.2 2.0 - 4.4 pg/mL         Review of Systems    Objective     /84 (BP Location: Left arm, Patient Position: Sitting, Cuff Size: Adult)   Pulse 53   Temp 96.6 °F (35.9 °C) (Infrared)   Ht 177.8 cm (70\")   Wt 98.9 kg (218 lb)   SpO2 98%   BMI 31.28 kg/m²     Physical Exam  Vitals and " nursing note reviewed.   Constitutional:       Appearance: Normal appearance.   HENT:      Head: Normocephalic.      Left Ear: External ear normal.   Cardiovascular:      Rate and Rhythm: Normal rate.      Pulses: Normal pulses.      Heart sounds: Normal heart sounds.   Pulmonary:      Effort: Pulmonary effort is normal.      Breath sounds: Normal breath sounds.   Abdominal:      General: Abdomen is flat. Bowel sounds are normal.      Palpations: Abdomen is soft.   Neurological:      General: No focal deficit present.      Mental Status: He is alert and oriented to person, place, and time.   Psychiatric:         Mood and Affect: Mood normal.         Behavior: Behavior normal.         Thought Content: Thought content normal.         Judgment: Judgment normal.         Result Review :                Assessment/Plan    Diagnoses and all orders for this visit:    1. Abdominal pain, unspecified abdominal location (Primary)  Comments:  resolved.     2. Essential hypertension  -     metoprolol tartrate (LOPRESSOR) 50 MG tablet; Take 1 tablet by mouth 2 (Two) Times a Day.  Dispense: 180 tablet; Refill: 0    Other orders  -     fenofibrate (Tricor) 145 MG tablet; Take 1 tablet by mouth Daily.  Dispense: 90 tablet; Refill: 3      Patient Instructions   Follow up if symptoms change or return.      Follow Up   No follow-ups on file.    Patient was given instructions and counseling regarding his condition or for health maintenance advice. Please see specific information pulled into the AVS if appropriate.     Samantha Knutson, APRN    08/04/21

## 2021-09-28 RX ORDER — FENOFIBRATE 145 MG/1
TABLET, COATED ORAL
Qty: 90 TABLET | Refills: 3 | OUTPATIENT
Start: 2021-09-28

## 2021-10-01 ENCOUNTER — OFFICE VISIT (OUTPATIENT)
Dept: ORTHOPEDIC SURGERY | Facility: CLINIC | Age: 76
End: 2021-10-01

## 2021-10-01 VITALS
HEIGHT: 70 IN | DIASTOLIC BLOOD PRESSURE: 67 MMHG | BODY MASS INDEX: 31.01 KG/M2 | WEIGHT: 216.6 LBS | HEART RATE: 59 BPM | SYSTOLIC BLOOD PRESSURE: 118 MMHG

## 2021-10-01 DIAGNOSIS — Z96.651 HX OF TOTAL KNEE REPLACEMENT, RIGHT: Primary | ICD-10-CM

## 2021-10-01 DIAGNOSIS — E66.9 OBESITY (BMI 30.0-34.9): ICD-10-CM

## 2021-10-01 DIAGNOSIS — I73.9 RIGHT LEG CLAUDICATION (HCC): ICD-10-CM

## 2021-10-01 PROBLEM — E66.811 OBESITY (BMI 30.0-34.9): Status: ACTIVE | Noted: 2021-10-01

## 2021-10-01 PROCEDURE — 99213 OFFICE O/P EST LOW 20 MIN: CPT | Performed by: PHYSICIAN ASSISTANT

## 2021-10-01 NOTE — PATIENT INSTRUCTIONS
Preventing Health Risks of Being Overweight  Maintaining a healthy body weight is an important part of your overall health. Your healthy body weight depends on your age, gender, and height. Being overweight puts you at risk for many health problems, including:  · Heart disease.  · Diabetes.  · Problems sleeping.  · Joint problems.  You can make changes to your diet and lifestyle to prevent these risks. Consider working with a health care provider or a dietitian to make these changes.  What nutrition changes can be made?    · Eat only as much as your body needs. In most cases, this is about 2,000 calories a day, but the amount varies depending on your height, gender, and activity level. Ask your health care provider how many calories you should have each day. Eating more than your body needs on a regular basis can cause you to become overweight or obese.  · Eat slowly, and stop eating when you feel full.  · Choose healthy foods, including:  ? Fruits and vegetables.  ? Lean meats.  ? Low-fat dairy products.  ? High-fiber foods, such as whole grains and beans.  ? Healthy snacks like vegetable sticks, a piece of fruit, or a small amount of yogurt or cheese.  · Avoid foods and drinks that are high in sugar, salt (sodium), saturated fat, or trans fat. This includes:  ? Many desserts such as candy, cookies, and ice cream.  ? Soda.  ? Fried foods.  ? Processed meats such as hot dogs or lunch meats.  ? Prepackaged snack foods.  What lifestyle changes can be made?    · Exercise for at least 150 minutes a week to prevent weight gain, or as often as recommended by your health care provider. Do moderate-intensity exercise, such as brisk walking.  ? Spread it out by exercising for 30 minutes 5 days a week, or in short 10-minute bursts several times a day.  · Find other ways to stay active and burn calories, such as yard work or a hobby that involves physical activity.  · Get at least 8 hours of sleep each night. When you are  well-rested, you are more likely to be active and make healthy choices during the day. To sleep better:  ? Try to go to bed and wake up at about the same time every day.  ? Keep your bedroom dark, quiet, and cool.  ? Make sure that your bed is comfortable.  ? Avoid stimulating activities, such as watching television or exercising, for at least one hour before bedtime.  Why are these changes important?  Eating healthy and being active helps you lose weight and prevent health problems caused by being overweight. Making these changes can also help you manage stress, feel better mentally, and connect with friends and family.  What can happen if changes are not made?  Being overweight can affect you for your entire life. You may develop joint or bone problems that make it painful or difficult for you to play sports or do activities you enjoy. Being overweight puts stress on your heart and lungs and can lead to medical problems like diabetes, heart disease, and sleeping problems.  Where to find support  You can get support for preventing health risks of being overweight from:  · Your health care provider or a dietitian. They can provide guidance about healthy eating and healthy lifestyle choices.  · Weight loss support groups, online or in-person.  Where to find more information  · MyPlate: www.choosemyplate.gov  ? This an online tool that provides personalized recommendations about foods to eat each day.  · The Centers for Disease Control and Prevention: www.cdc.gov/healthyweight  ? This resource gives tips for managing weight and having an active lifestyle.  Summary  · To prevent unhealthy weight gain, it is important to maintain a healthy diet high in vegetables and whole grains, exercise regularly, and get at least 8 hours of sleep each night.  · Making these changes helps prevent many long-term (chronic) health conditions that can shorten your life, such as diabetes, heart disease, and stroke.  This information is  not intended to replace advice given to you by your health care provider. Make sure you discuss any questions you have with your health care provider.  Document Revised: 04/15/2021 Document Reviewed: 04/15/2021  [employer] Patient Education © 2021 [employer]      Advance Care Planning and Advance Directives     You make decisions on a daily basis - decisions about where you want to live, your career, your home, your life. Perhaps one of the most important decisions you face is your choice for future medical care. Take time to talk with your family and your healthcare team and start planning today.  Advance Care Planning is a process that can help you:  · Understand possible future healthcare decisions in light of your own experiences  · Reflect on those decision in light of your goals and values  · Discuss your decisions with those closest to you and the healthcare professionals that care for you  · Make a plan by creating a document that reflects your wishes    Surrogate Decision Maker  In the event of a medical emergency, which has left you unable to communicate or to make your own decisions, you would need someone to make decisions for you.  It is important to discuss your preferences for medical treatment with this person while you are in good health.     Qualities of a surrogate decision maker:  • Willing to take on this role and responsibility  • Knows what you want for future medical care  • Willing to follow your wishes even if they don't agree with them  • Able to make difficult medical decisions under stressful circumstances    Advance Directives  These are legal documents you can create that will guide your healthcare team and decision maker(s) when needed. These documents can be stored in the electronic medical record.    · Living Will - a legal document to guide your care if you have a terminal condition or a serious illness and are unable to communicate. States vary by statute in document names/types,  but most forms may include one or more of the following:        -  Directions regarding life-prolonging treatments        -  Directions regarding artificially provided nutrition/hydration        -  Choosing a healthcare decision maker        -  Direction regarding organ/tissue donation    · Durable Power of  for Healthcare - this document names an -in-fact to make medical decisions for you, but it may also allow this person to make personal and financial decisions for you. Please seek the advice of an  if you need this type of document.    **Advance Directives are not required and no one may discriminate against you if you do not sign one.    Medical Orders  Many states allow specific forms/orders signed by your physician to record your wishes for medical treatment in your current state of health. This form, signed in personal communication with your physician, addresses resuscitation and other medical interventions that you may or may not want.      For more information or to schedule a time with a [facility] Advance Care Planning Facilitator contact: [email or web address] or call [phone] and someone will contact you directly.

## 2021-10-01 NOTE — PROGRESS NOTES
ORTHO FOLLOW UP       Subjective:    HPI:   Shai Chery is a 75 y.o. male who presents in follow-up for right knee pain.  He had a right total knee replacement on 1/16/2019 by Dr. Crain and reports that he continues to have intermittent discomfort.  He reports that his pain is mostly with activity.      Past Medical History:   Diagnosis Date   • Abnormal laboratory test 03/26/2018   • Alcoholic polyneuropathy (HCC)    • Allergic rhinitis 05/01/2013   • Anxiety 05/23/2016   • Arthritis 12/06/2016   • Benign prostatic hyperplasia    • Bitten by dog, initial encounter 10/16/2018   • Body mass index 30.0-30.9, adult 12/22/2017   • Body mass index 31.0-31.9, adult 01/16/2018   • Brain mass    • Callus of foot 12/22/2016    Calluses, feet, bilateral   • Cancer (HCC)     brain   • Cellulitis and abscess of leg 12/12/2017   • COPD (chronic obstructive pulmonary disease) ([facility])    • Cough 12/06/2016   • Degenerative joint disease of knee, right 03/26/2018    primary localized degenerative joint disease of right knee.    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 However diet modification and compliance with medication is discussed with the patient. Patient is advised to have periodic AST, ALT, and lipid panel testing through your office.    • Hypertension  05/01/2013    Benign. 12-14-18- patient's blood pressure is within desireable ranges. At this time, I would not recommend any change in antihypertensive regimen. However, patient is advised to check the blood pressure periodically at home and bring the logbook on next visit. Patient is also encouraged to increase aerobic activities as tolerated. Risk factor modification is discussed.    • Hypothyroidism    • Impotence of organic origin    • Insomnia    • Internal derangement of knee 05/05/2014 6-9-14- He saw Dr. Sams and Dr. Concepcion. He had fluid drained from his knee with negative cultures and negative crystals. Both were not concerned about the MRI (report not avilable), but he has a radiologist friend who read it as having meniscal damage. He is offered referral back to the orthopedist of his choice and will thnk about who he wants to see. He has upcoming shoulder surgery.    • Knee pain, right 05/04/2017   • Knee pain, right 04/03/2017   • Low back pain    • Medication monitoring encounter 04/19/2019   • Meningioma (HCC) 06/09/2014   • Nasal polyp 10/06/2017   • Neoplasm of uncertain behavior 10/11/2018   • Neoplasm of uncertain behavior of skin 05/04/2015   • Neuropathy 12/18/2014   • Obesity    • Orthopedic aftercare 06/12/2017   • Other fatigue 12/4/2019   • Overweight 12/12/2017   • Pain in joint of right ankle 05/31/2017   • Pain management     [facility]    • Palpitations 12/22/2017    3-16-18- his symptom of palpitation have improved. Continue beta-blockers.    • Pre-op exam 12/18/2018   • Prostatic hypertrophy 06/09/2014    Benign   • Puncture wound without foreign body of left hand, initial encounter 10/16/2018   • Rash 06/28/2017   • Rotator cuff tear 05/19/2014    left   • Shortness of breath 02/02/2018   • Sinus tachycardia 03/16/2018 12-14-18- much better. Continue current treat.    • Tobacco use        Past Surgical History:   Procedure Laterality Date   • ANKLE HARDWARE REMOVAL  Right    • APPENDECTOMY     • COLONOSCOPY      every 3 years with [facility]   • EYE SURGERY Bilateral     cataracts   • JOINT REPLACEMENT Right 2019    knee   • KNEE ARTHROSCOPY  2017    and 2014. Dr Gallego. With partial medial and lateral miniscectomy.    • KNEE ARTHROSCOPY Right 2017   • KNEE ARTHROSCOPY W/ MENISCECTOMY Right 2018    right knee scope/meniscectomy   • LUMBAR DISC SURGERY  2015    Right L3-4 diskectomy. Dr. Pollard.    • ROTATOR CUFF REPAIR  2014    Dr Gallego.   • SHOULDER SURGERY Left    • THYROIDECTOMY     • TONSILLECTOMY     • TOTAL KNEE ARTHROPLASTY Right 2019    Right TKR   • TOTAL KNEE ARTHROPLASTY Right 2019    Dr. Crain       Social History     Occupational History   • Not on file   Tobacco Use   • Smoking status: Former Smoker     Packs/day: 1.00     Years: 40.00     Pack years: 40.00     Start date: 1980     Quit date: 2020     Years since quittin.7   • Smokeless tobacco: Never Used   • Tobacco comment: quit 2020   Vaping Use   • Vaping Use: Never used   Substance and Sexual Activity   • Alcohol use: Yes     Types: 2 Glasses of wine per week   • Drug use: No   • Sexual activity: Not Currently     Partners: Female      The following portions of the patient's history were reviewed and updated as appropriate: allergies, current medications, past family history, past medical history, past social history, past surgical history and problem list.    Medications:    Current Outpatient Medications:   •  atorvastatin (LIPITOR) 40 MG tablet, Take 1 tablet by mouth Daily., Disp: , Rfl:   •  baclofen (LIORESAL) 10 MG tablet, TAKE 1 TABLET TWICE DAILY AS NEEDED, Disp: 60 tablet, Rfl: 2  •  fenofibrate (Tricor) 145 MG tablet, Take 1 tablet by mouth Daily., Disp: 90 tablet, Rfl: 3  •  gabapentin (NEURONTIN) 300 MG capsule, 1 every morning, 1 every afternoon, and 3 nightly as needed, Disp: 75 capsule, Rfl: 0  •  glucosamine-chondroitin 500-400 MG  "capsule capsule, Take  by mouth 3 (Three) Times a Day With Meals., Disp: , Rfl:   •  levothyroxine (SYNTHROID, LEVOTHROID) 200 MCG tablet, Take 1 tablet by mouth Daily., Disp: 90 tablet, Rfl: 3  •  metoprolol tartrate (LOPRESSOR) 50 MG tablet, Take 1 tablet by mouth 2 (Two) Times a Day., Disp: 180 tablet, Rfl: 0  •  Myrbetriq 50 MG tablet sustained-release 24 hour 24 hr tablet, , Disp: , Rfl:   •  valsartan (DIOVAN) 160 MG tablet, , Disp: , Rfl:     Allergies:  No Known Allergies    Review of Systems:  Gen -no fever, chills , sweats, headache   Eyes - no irritation or discharge   ENT -  no ear pain , runny nose , sore throat , difficulty swallowing   Resp - no cough , congestion , excessive expectoration   CVS - no chest pain , palpitations.   Abd - no pain , nausea , vomiting , diarrhea   Skin - no rash , lesions.   Neuro - no dizziness    Please see HPI for any other pertinent positives.  All other systems were reviewed and are negative.       Objective   Objective:    /67 (BP Location: Right arm, Patient Position: Sitting, Cuff Size: Large Adult)   Pulse 59   Ht 177.8 cm (70\")   Wt 98.2 kg (216 lb 9.6 oz)   BMI 31.08 kg/m²     Physical Examination:  Alert, oriented, obese individual in no acute distress, ambulating unassisted  Right lower extremity shows a well-healed surgical incision with no erythema, drainage, or open skin lesions. There is no appreciable swelling. It is grossly well aligned, and the patient is neurovascularly intact distally. The knee is stable to varus and valgus stress, there is no patellar maltracking or crepitus noted, and plantar and dorsiflexion is 5/5. There is no tenderness to palpation or with range of motion, which is about 0-120.           Imaging:  xrays obtained today  right Knee X-Ray    Date of exam: 10/1/2021    Indication: Status post right total knee replacement, right knee pain    AP, Lateral, [place] views    Findings:A well positioned knee replacement without " evidence of bony or implant failure with no significant changes when compared to previous imaging., No fractures or dislocations are appreciated and there appears to be some vascular calcifications present, which is new when compared to previous imaging.    within normal limits joint spaces    Hardware appropriately positioned yes    yes prior studies available for comparison.    This patient's x-ray report was graded according to the Kellgren and Valentin classification.  This took into account the joint space narrowing, osteophyte formation, sclerosis of the distal femur/proximal tibia along with deformity of those bones.  The findings were indicative of K L grade na.    X-RAY was ordered and reviewed by SHERYL Blackman          Assessment:  1. Hx of total knee replacement, right    2. Obesity (BMI 30.0-34.9)                 Plan:  I am recommending a vascular evaluation for claudication, he will be referred.  He may follow-up with us as needed.             SHERYL Blackman  10/01/21  13:12 EDT    EMR Dragon/Transcription disclaimer:  Much of this encounter note is an electronic transcription/translation of spoken language to printed text. The electronic translation of spoken language may permit erroneous, or at times, nonsensical words or phrases to be inadvertently transcribed; Although I have reviewed the note for such errors, some may still exist.

## 2021-10-04 ENCOUNTER — TELEPHONE (OUTPATIENT)
Dept: ORTHOPEDIC SURGERY | Facility: CLINIC | Age: 76
End: 2021-10-04

## 2021-10-04 DIAGNOSIS — Z96.651 HX OF TOTAL KNEE REPLACEMENT, RIGHT: Primary | ICD-10-CM

## 2021-10-04 NOTE — TELEPHONE ENCOUNTER
----- Message from SHERYL Blackman sent at 10/1/2021  1:24 PM EDT -----  My note is not complete, but will need a referral to vascular surgery to evaluate for claudication.  Thanks

## 2021-10-19 NOTE — TELEPHONE ENCOUNTER
Referral faxed; still waiting on note to send to them.    Call placed to patient, left message on machine advising appt scheduled for 12/7/2021 at 10:15 with Dr. Carlson

## 2021-11-22 ENCOUNTER — HOSPITAL ENCOUNTER (OUTPATIENT)
Dept: CT IMAGING | Facility: HOSPITAL | Age: 76
Discharge: HOME | End: 2021-11-22
Admitting: NURSE PRACTITIONER

## 2021-11-22 DIAGNOSIS — I26.99 OTHER ACUTE PULMONARY EMBOLISM, UNSPECIFIED WHETHER ACUTE COR PULMONALE PRESENT (HCC): ICD-10-CM

## 2021-11-22 DIAGNOSIS — I26.99 OTHER ACUTE PULMONARY EMBOLISM, UNSPECIFIED WHETHER ACUTE COR PULMONALE PRESENT (HCC): Primary | ICD-10-CM

## 2021-11-22 LAB — CREAT BLDA-MCNC: 1.3 MG/DL (ref 0.6–1.3)

## 2021-11-22 PROCEDURE — 0 IOPAMIDOL PER 1 ML: Performed by: NURSE PRACTITIONER

## 2021-11-22 PROCEDURE — 82565 ASSAY OF CREATININE: CPT

## 2021-11-22 PROCEDURE — 71275 CT ANGIOGRAPHY CHEST: CPT

## 2021-11-22 RX ADMIN — IOPAMIDOL 100 ML: 755 INJECTION, SOLUTION INTRAVENOUS at 18:17

## 2021-11-23 ENCOUNTER — TELEPHONE (OUTPATIENT)
Dept: FAMILY MEDICINE CLINIC | Facility: CLINIC | Age: 76
End: 2021-11-23

## 2021-11-23 RX ORDER — APIXABAN 5 MG (74)
5 KIT ORAL TAKE AS DIRECTED
Qty: 74 TABLET | Refills: 0 | Status: SHIPPED | OUTPATIENT
Start: 2021-11-23 | End: 2021-11-23

## 2021-11-23 NOTE — TELEPHONE ENCOUNTER
Caller: Shai Chery    Relationship to patient: Self    Best call back number: [phone] (M)    Patient is needing: PATIENT RETURNING CALL TO JUAN GUTIERRES. PLEASE RETURN PATIENTS CALL WHEN AVAILABLE.

## 2021-11-23 NOTE — TELEPHONE ENCOUNTER
Caller: Shai Chery    Relationship: Self    Best call back number: [phone]      What was the call regarding: PATIENT WANTS TO MAKE SURE THE ELIQUIS GOES TO HIS LOCAL PHARMACY AND NOT MAIL DELIVERY.    Do you require a callback: NO    [facility] [address] - [place], [place] - [address] - [phone]  - [phone] FX  [phone]

## 2021-11-24 ENCOUNTER — DOCUMENTATION (OUTPATIENT)
Dept: FAMILY MEDICINE CLINIC | Facility: CLINIC | Age: 76
End: 2021-11-24

## 2021-11-24 ENCOUNTER — TELEPHONE (OUTPATIENT)
Dept: FAMILY MEDICINE CLINIC | Facility: CLINIC | Age: 76
End: 2021-11-24

## 2021-11-24 NOTE — PROGRESS NOTES
I called the patient his concern was that his aneurysm was not addressed in recent cT chest. I talked with Dr Jacob radiolgy at [facility].  She did report to see the aneurysm and it measures 4.3 no change from previous.  She did report best way to evaluate this is with CTA chest not needed now.   He can discuss this with vascular surgery next appointment.  He was agreeable.

## 2021-11-24 NOTE — TELEPHONE ENCOUNTER
Caller: Shai Chery    Relationship: Self    Best call back number: [phone]    What is the best time to reach you: ANYTIME    Who are you requesting to speak with (clinical staff, provider,  specific staff member): PIPE GUTIERRES     What was the call regarding: PATIENT IS REQUESTING TO SPEAK WITH PIPE GUTIERRES     Do you require a callback: YES

## 2021-11-24 NOTE — PROGRESS NOTES
Shai Chery Iverson: [id number] help? Outcome  Additional Information Required  Available without authorization.  Drug  Eliquis DVT/PE Starter Pack 5MG tablets  Form  [employer] Form

## 2021-12-01 ENCOUNTER — TELEPHONE (OUTPATIENT)
Dept: FAMILY MEDICINE CLINIC | Facility: CLINIC | Age: 76
End: 2021-12-01

## 2021-12-01 NOTE — TELEPHONE ENCOUNTER
Hub to read;  Has appointment Dec 13th. Wants to know if should continue Eliquis. If so needs refill to pharmacy.

## 2021-12-07 ENCOUNTER — APPOINTMENT (OUTPATIENT)
Dept: VASCULAR SURGERY | Facility: HOSPITAL | Age: 76
End: 2021-12-07

## 2021-12-07 PROCEDURE — G0463 HOSPITAL OUTPT CLINIC VISIT: HCPCS

## 2021-12-13 ENCOUNTER — OFFICE VISIT (OUTPATIENT)
Dept: FAMILY MEDICINE CLINIC | Facility: CLINIC | Age: 76
End: 2021-12-13

## 2021-12-13 VITALS
HEART RATE: 61 BPM | SYSTOLIC BLOOD PRESSURE: 118 MMHG | HEIGHT: 70 IN | DIASTOLIC BLOOD PRESSURE: 73 MMHG | TEMPERATURE: 96.9 F | BODY MASS INDEX: 31.5 KG/M2 | OXYGEN SATURATION: 97 % | WEIGHT: 220 LBS

## 2021-12-13 DIAGNOSIS — E78.2 MIXED HYPERLIPIDEMIA: Chronic | ICD-10-CM

## 2021-12-13 DIAGNOSIS — E03.9 HYPOTHYROIDISM, UNSPECIFIED TYPE: Chronic | ICD-10-CM

## 2021-12-13 DIAGNOSIS — G62.9 NEUROPATHY: Chronic | ICD-10-CM

## 2021-12-13 DIAGNOSIS — I71.20 THORACIC AORTIC ANEURYSM WITHOUT RUPTURE (HCC): Chronic | ICD-10-CM

## 2021-12-13 DIAGNOSIS — K21.9 GASTROESOPHAGEAL REFLUX DISEASE, UNSPECIFIED WHETHER ESOPHAGITIS PRESENT: Chronic | ICD-10-CM

## 2021-12-13 DIAGNOSIS — N40.1 BENIGN PROSTATIC HYPERPLASIA WITH LOWER URINARY TRACT SYMPTOMS, SYMPTOM DETAILS UNSPECIFIED: Chronic | ICD-10-CM

## 2021-12-13 DIAGNOSIS — R00.2 PALPITATIONS: Chronic | ICD-10-CM

## 2021-12-13 DIAGNOSIS — I26.94 MULTIPLE SUBSEGMENTAL PULMONARY EMBOLI WITHOUT ACUTE COR PULMONALE (HCC): ICD-10-CM

## 2021-12-13 DIAGNOSIS — D69.6 THROMBOCYTOPENIA (HCC): Chronic | ICD-10-CM

## 2021-12-13 DIAGNOSIS — Z00.00 MEDICARE ANNUAL WELLNESS VISIT, SUBSEQUENT: Primary | ICD-10-CM

## 2021-12-13 DIAGNOSIS — I10 ESSENTIAL HYPERTENSION: ICD-10-CM

## 2021-12-13 PROBLEM — J40 BRONCHITIS: Status: RESOLVED | Noted: 2021-04-23 | Resolved: 2021-12-13

## 2021-12-13 PROCEDURE — 1126F AMNT PAIN NOTED NONE PRSNT: CPT | Performed by: NURSE PRACTITIONER

## 2021-12-13 PROCEDURE — G0438 PPPS, INITIAL VISIT: HCPCS | Performed by: NURSE PRACTITIONER

## 2021-12-13 PROCEDURE — 1160F RVW MEDS BY RX/DR IN RCRD: CPT | Performed by: NURSE PRACTITIONER

## 2021-12-13 PROCEDURE — 99214 OFFICE O/P EST MOD 30 MIN: CPT | Performed by: NURSE PRACTITIONER

## 2021-12-13 PROCEDURE — 1170F FXNL STATUS ASSESSED: CPT | Performed by: NURSE PRACTITIONER

## 2021-12-13 PROCEDURE — 96160 PT-FOCUSED HLTH RISK ASSMT: CPT | Performed by: NURSE PRACTITIONER

## 2021-12-13 RX ORDER — GABAPENTIN 600 MG/1
TABLET ORAL
COMMUNITY
Start: 2021-11-05

## 2021-12-13 NOTE — PROGRESS NOTES
The ABCs of the Annual Wellness Visit  Subsequent Medicare Wellness Visit    Chief Complaint   Patient presents with   • Medicare Wellness-Initial Visit     INITIAL MEDICARE WELLNESS   • Hypertension     3 MONTH F/U      Subjective    History of Present Illness:  Shai Chery is a 75 y.o. male who presents for a Subsequent Medicare Wellness Visit.    Patient is here for management of his chronic medical problems:   Pulmonary emboli right greater than left. , 4.3 cm aneurysm thoracic aortic., hypothyroidism, GERD BPH. Peripheral neuropathy.     Pulmonary emboli currently is taking eliquis 5 mg bid.     Hypothyroidism; taking levothyroxine 200 mcg once day.     Hyperlipidemia:atorvastatin 40 mg once day, fenofibrate 145 mg daily. Has CAD .    Peripheral neuropathy gabapentin 300 am 300 pm 600 bedtime.    Hypertension: valsartan 160 mg once day metoprolol tartrate 50 mg bid. Followed by cardiology for heart palpatations denies any symptoms.     BPH myrbetriq 50 mg daily followed by urology       The following portions of the patient's history were reviewed and   updated as appropriate: allergies, current medications, past family history, past medical history, past social history, past surgical history and problem list.    Compared to one year ago, the patient feels his physical   health is worse.    Compared to one year ago, the patient feels his mental   health is the same.    Recent Hospitalizations:  He was not admitted to the hospital during the last year.       Current Medical Providers:  Patient Care Team:  Samantha Knutson APRN as PCP - General (Nurse Practitioner)  Reinaldo Ernst MD as Consulting Physician (Cardiology)  Adilson Jeffrey MD as Consulting Physician (Cardiothoracic Surgery)  Shai Bland MD as Consulting Physician (Neurosurgery)  Samantha Knutson APRN as Nurse Practitioner (Nurse Practitioner)  Leonardo Pendleton MD as Consulting Physician (Vascular Surgery)  Tamiko  Thad Moody MD as Consulting Physician (Urology)  Yasmin Barrios MD (Dermatology)    Outpatient Medications Prior to Visit   Medication Sig Dispense Refill   • atorvastatin (LIPITOR) 40 MG tablet Take 1 tablet by mouth Daily.     • baclofen (LIORESAL) 10 MG tablet TAKE 1 TABLET TWICE DAILY AS NEEDED 60 tablet 2   • fenofibrate (Tricor) 145 MG tablet Take 1 tablet by mouth Daily. 90 tablet 3   • gabapentin (NEURONTIN) 300 MG capsule 1 every morning, 1 every afternoon, and 3 nightly as needed 75 capsule 0   • glucosamine-chondroitin 500-400 MG capsule capsule Take  by mouth 3 (Three) Times a Day With Meals.     • levothyroxine (SYNTHROID, LEVOTHROID) 200 MCG tablet Take 1 tablet by mouth Daily. 90 tablet 3   • metoprolol tartrate (LOPRESSOR) 50 MG tablet Take 1 tablet by mouth 2 (Two) Times a Day. 180 tablet 0   • Myrbetriq 50 MG tablet sustained-release 24 hour 24 hr tablet      • valsartan (DIOVAN) 160 MG tablet      • apixaban (Eliquis) 5 MG tablet tablet TAKE 10 MG 2 TIMES A DAY FOR 7 DAYS. THEN TAKE 5 MG 2 TIMES A DAY.YOU WILL CONTINUE THAT DOSE 60 tablet 3   • gabapentin (NEURONTIN) 600 MG tablet        No facility-administered medications prior to visit.       No opioid medication identified on active medication list. I have reviewed chart for other potential  high risk medication/s and harmful drug interactions in the elderly.          Aspirin is not on active medication list.  Aspirin use is not indicated based on review of current medical condition/s. Risk of harm outweighs potential benefits.  .    Patient Active Problem List   Diagnosis   • Hx of total knee replacement, right   • Primary osteoarthritis of left knee   • Occipital neuralgia   • Arthritis   • Benign prostatic hyperplasia   • Derangement of medial meniscus of right knee   • Oral phase dysphagia   • Dystrophia unguium   • Mixed hyperlipidemia   • Hypothyroidism   • Meningioma (HCC)   • Neuropathy   • Class 1 obesity due to excess  "calories with serious comorbidity and body mass index (BMI) of 30.0 to 30.9 in adult   • Primary localized osteoarthritis   • Rotator cuff tear   • Presence of right artificial knee joint   • Alcoholic polyneuropathy (HCC)   • Brain mass   • Diverticulosis   • Fracture of fibula   • Gastro-esophageal reflux   • Hypoglycemia   • Impotence of organic origin   • Insomnia   • Thrombocytopenia (HCC)   • Tubular adenoma of colon   • Sinus tachycardia   • Essential hypertension   • Dyspnea   • Shortness of breath   • History of tobacco use   • Skin tag   • Swelling of left foot   • Callus of foot   • Palpitations   • Medicare annual wellness visit, subsequent   • Atypical chest pain   • Oral thrush   • Abdominal pain   • Obesity (BMI 30.0-34.9)   • Thoracic aortic aneurysm without rupture (HCC)   • Multiple subsegmental pulmonary emboli without acute cor pulmonale (HCC)     Advance Care Planning  Advance Directive is on file.  ACP discussion was held with the patient during this visit. Patient has an advance directive in EMR which is still valid.     Review of Systems   HENT:        Current with dental    Has hearing aids   Eyes:        Wears glasses last eye exam one year   Respiratory: Negative for cough, choking and wheezing.    Cardiovascular: Negative for chest pain and leg swelling.   Skin:        Left cheek skin lesion removed followed by dermatology   Neurological: Negative for tremors and seizures.   Hematological: Negative.    Psychiatric/Behavioral: Negative for decreased concentration, hallucinations and suicidal ideas. The patient is not nervous/anxious.         Objective    Vitals:    12/13/21 1246   BP: 118/73   BP Location: Left arm   Patient Position: Sitting   Cuff Size: Adult   Pulse: 61   Temp: 96.9 °F (36.1 °C)   TempSrc: Infrared   SpO2: 97%   Weight: 99.8 kg (220 lb)   Height: 177.8 cm (70\")   PainSc: 0-No pain     BMI Readings from Last 1 Encounters:   12/13/21 31.57 kg/m²   BMI is above normal " parameters. Recommendations include: none (medical contraindication)    Does the patient have evidence of cognitive impairment? No    Physical Exam  Vitals and nursing note reviewed.   Constitutional:       Appearance: Normal appearance.   HENT:      Head: Normocephalic.      Right Ear: Tympanic membrane normal.      Left Ear: Tympanic membrane normal.      Nose: Nose normal.      Mouth/Throat:      Mouth: Mucous membranes are moist.   Eyes:      Pupils: Pupils are equal, round, and reactive to light.   Cardiovascular:      Rate and Rhythm: Normal rate and regular rhythm.      Pulses: Normal pulses.      Heart sounds: Normal heart sounds.   Pulmonary:      Effort: Pulmonary effort is normal.      Breath sounds: Normal breath sounds.   Abdominal:      General: Abdomen is flat. Bowel sounds are normal.      Palpations: Abdomen is soft.   Musculoskeletal:         General: Normal range of motion.      Cervical back: Normal range of motion.   Skin:     General: Skin is warm and dry.   Neurological:      General: No focal deficit present.      Mental Status: He is alert and oriented to person, place, and time.   Psychiatric:         Mood and Affect: Mood normal.         Behavior: Behavior normal.         Thought Content: Thought content normal.         Judgment: Judgment normal.                 HEALTH RISK ASSESSMENT    Smoking Status:  Social History     Tobacco Use   Smoking Status Former Smoker   • Packs/day: 1.00   • Years: 40.00   • Pack years: 40.00   • Start date: 1980   • Quit date: 2020   • Years since quittin.9   Smokeless Tobacco Never Used   Tobacco Comment    quit 2020     Alcohol Consumption:  Social History     Substance and Sexual Activity   Alcohol Use Yes   • Types: 2 Glasses of wine per week     Fall Risk Screen:    VJ Fall Risk Assessment was completed, and patient is at LOW risk for falls.Assessment completed on:2021    Depression Screening:  PHQ-2/PHQ-9 Depression Screening  12/13/2021   Little interest or pleasure in doing things 0   Feeling down, depressed, or hopeless 0   Trouble falling or staying asleep, or sleeping too much -   Feeling tired or having little energy -   Poor appetite or overeating -   Feeling bad about yourself - or that you are a failure or have let yourself or your family down -   Trouble concentrating on things, such as reading the newspaper or watching television -   Moving or speaking so slowly that other people could have noticed. Or the opposite - being so fidgety or restless that you have been moving around a lot more than usual -   Thoughts that you would be better off dead, or of hurting yourself in some way -   Total Score 0   If you checked off any problems, how difficult have these problems made it for you to do your work, take care of things at home, or get along with other people? -       Health Habits and Functional and Cognitive Screening:  Functional & Cognitive Status 12/13/2021   Do you have difficulty preparing food and eating? No   Do you have difficulty bathing yourself, getting dressed or grooming yourself? No   Do you have difficulty using the toilet? No   Do you have difficulty moving around from place to place? No   Do you have trouble with steps or getting out of a bed or a chair? No   Current Diet Well Balanced Diet   Dental Exam Up to date   Eye Exam Not up to date   Exercise (times per week) 3 times per week   Current Exercises Include Weightlifting   Current Exercise Activities Include -   Do you need help using the phone?  No   Are you deaf or do you have serious difficulty hearing?  Yes   Do you need help with transportation? No   Do you need help shopping? No   Do you need help preparing meals?  No   Do you need help with housework?  No   Do you need help with laundry? No   Do you need help taking your medications? No   Do you need help managing money? No   Do you ever drive or ride in a car without wearing a seat belt? No   Have  you felt unusual stress, anger or loneliness in the last month? No   Who do you live with? Spouse   If you need help, do you have trouble finding someone available to you? No   Have you been bothered in the last four weeks by sexual problems? No   Do you have difficulty concentrating, remembering or making decisions? No       Age-appropriate Screening Schedule:  Refer to the list below for future screening recommendations based on patient's age, sex and/or medical conditions. Orders for these recommended tests are listed in the plan section. The patient has been provided with a written plan.    Health Maintenance   Topic Date Due   • TDAP/TD VACCINES (2 - Tdap) 05/03/2018   • LIPID PANEL  08/30/2022   • INFLUENZA VACCINE  Completed   • ZOSTER VACCINE  Completed              Assessment/Plan   CMS Preventative Services Quick Reference  Risk Factors Identified During Encounter  None Identified  The above risks/problems have been discussed with the patient.  Follow up actions/plans if indicated are seen below in the Assessment/Plan Section.  Pertinent information has been shared with the patient in the After Visit Summary.    Diagnoses and all orders for this visit:    1. Medicare annual wellness visit, subsequent (Primary)  Comments:  completed      2. Essential hypertension  Comments:  stable    3. Mixed hyperlipidemia  Comments:  stable    4. Palpitations  Comments:  stable    5. Thoracic aortic aneurysm without rupture (HCC)  Comments:  he is followed by cardiology . current testing.     6. Hypothyroidism, unspecified type  Comments:  stable    7. Gastroesophageal reflux disease, unspecified whether esophagitis present  Comments:  stable    8. Benign prostatic hyperplasia with lower urinary tract symptoms, symptom details unspecified  Comments:  recent scope due to abnormal findings recently. followed by urology    9. Neuropathy  Comments:  stable    10. Thrombocytopenia (HCC)  Comments:  recently found to have  pulmonary embolis history of thrombocytopenia  Orders:  -     Ambulatory Referral to Hematology    11. Multiple subsegmental pulmonary emboli without acute cor pulmonale (HCC)  Comments:  referred to hematology for evlautation.   Orders:  -     Ambulatory Referral to Hematology    Other orders  -     apixaban (Eliquis) 5 MG tablet tablet; Take 5 mg bid  Dispense: 180 tablet; Refill: 1        Follow Up:   No follow-ups on file.     An After Visit Summary and PPPS were made available to the patient.

## 2021-12-13 NOTE — PATIENT INSTRUCTIONS
Find out what recent labs were completed   I can order what wasn't done   Follow up with hematology with new finding of pulmonary embolism

## 2021-12-13 NOTE — PROGRESS NOTES
Subjective        Shai Chery is a 75 y.o. male.     Chief Complaint   Patient presents with   • Medicare Wellness-Initial Visit     INITIAL MEDICARE WELLNESS   • Hypertension     3 MONTH F/U       History of Present Illness    The following portions of the patient's history were reviewed and updated as appropriate: allergies, current medications, past family history, past medical history, past social history, past surgical history and problem list.      Current Outpatient Medications:   •  apixaban (Eliquis) 5 MG tablet tablet, TAKE 10 MG 2 TIMES A DAY FOR 7 DAYS. THEN TAKE 5 MG 2 TIMES A DAY.YOU WILL CONTINUE THAT DOSE, Disp: 60 tablet, Rfl: 3  •  atorvastatin (LIPITOR) 40 MG tablet, Take 1 tablet by mouth Daily., Disp: , Rfl:   •  fenofibrate (Tricor) 145 MG tablet, Take 1 tablet by mouth Daily., Disp: 90 tablet, Rfl: 3  •  gabapentin (NEURONTIN) 300 MG capsule, 1 every morning, 1 every afternoon, and 3 nightly as needed, Disp: 75 capsule, Rfl: 0  •  glucosamine-chondroitin 500-400 MG capsule capsule, Take  by mouth 3 (Three) Times a Day With Meals., Disp: , Rfl:   •  levothyroxine (SYNTHROID, LEVOTHROID) 200 MCG tablet, Take 1 tablet by mouth Daily., Disp: 90 tablet, Rfl: 3  •  metoprolol tartrate (LOPRESSOR) 50 MG tablet, Take 1 tablet by mouth 2 (Two) Times a Day., Disp: 180 tablet, Rfl: 0  •  Myrbetriq 50 MG tablet sustained-release 24 hour 24 hr tablet, , Disp: , Rfl:   •  valsartan (DIOVAN) 160 MG tablet, , Disp: , Rfl:   •  baclofen (LIORESAL) 10 MG tablet, TAKE 1 TABLET TWICE DAILY AS NEEDED, Disp: 60 tablet, Rfl: 2    Recent Results (from the past 4032 hour(s))   POC Creatinine    Collection Time: 11/22/21  5:46 PM    Specimen: Venous Blood   Result Value Ref Range    Creatinine 1.30 0.60 - 1.30 mg/dL    GFR MDRD       GFR MDRD Non African American 54 0 - 60 mL/min/1.73 sq.M         Review of Systems    Objective     /73 (BP Location: Left arm, Patient Position: Sitting, Cuff  "Size: Adult)   Pulse 61   Temp 96.9 °F (36.1 °C) (Infrared)   Ht 177.8 cm (70\")   Wt 99.8 kg (220 lb)   SpO2 97%   BMI 31.57 kg/m²     Physical Exam    Result Review :                Assessment/Plan    There are no diagnoses linked to this encounter.  There are no Patient Instructions on file for this visit.    Follow Up   No follow-ups on file.    Patient was given instructions and counseling regarding his condition or for health maintenance advice. Please see specific information pulled into the AVS if appropriate.     Samantha Knutson, APRN    12/13/21      "

## 2022-01-18 ENCOUNTER — HOSPITAL ENCOUNTER (EMERGENCY)
Facility: HOSPITAL | Age: 77
Discharge: HOME | End: 2022-01-18
Attending: EMERGENCY MEDICINE | Admitting: EMERGENCY MEDICINE

## 2022-01-18 ENCOUNTER — APPOINTMENT (OUTPATIENT)
Dept: GENERAL RADIOLOGY | Facility: HOSPITAL | Age: 77
End: 2022-01-18

## 2022-01-18 VITALS
HEIGHT: 70 IN | DIASTOLIC BLOOD PRESSURE: 71 MMHG | BODY MASS INDEX: 31.5 KG/M2 | SYSTOLIC BLOOD PRESSURE: 130 MMHG | WEIGHT: 220 LBS | TEMPERATURE: 98.1 F | HEART RATE: 60 BPM | RESPIRATION RATE: 18 BRPM | OXYGEN SATURATION: 99 %

## 2022-01-18 DIAGNOSIS — R06.00 DYSPNEA, UNSPECIFIED TYPE: Primary | ICD-10-CM

## 2022-01-18 LAB
ALBUMIN SERPL-MCNC: 4.5 G/DL (ref 3.5–5.2)
ALBUMIN/GLOB SERPL: 1.7 G/DL
ALP SERPL-CCNC: 38 U/L (ref 39–117)
ALT SERPL W P-5'-P-CCNC: 42 U/L (ref 1–41)
ANION GAP SERPL CALCULATED.3IONS-SCNC: 12 MMOL/L (ref 5–15)
AST SERPL-CCNC: 40 U/L (ref 1–40)
BASOPHILS # BLD AUTO: 0 10*3/MM3 (ref 0–0.2)
BASOPHILS NFR BLD AUTO: 0.6 % (ref 0–1.5)
BILIRUB SERPL-MCNC: 0.7 MG/DL (ref 0–1.2)
BUN SERPL-MCNC: 16 MG/DL (ref 8–23)
BUN/CREAT SERPL: 15.2 (ref 7–25)
CALCIUM SPEC-SCNC: 9.1 MG/DL (ref 8.6–10.5)
CHLORIDE SERPL-SCNC: 101 MMOL/L (ref 98–107)
CO2 SERPL-SCNC: 23 MMOL/L (ref 22–29)
CREAT SERPL-MCNC: 1.05 MG/DL (ref 0.76–1.27)
D DIMER PPP FEU-MCNC: 0.21 MG/L (FEU) (ref 0–0.59)
DEPRECATED RDW RBC AUTO: 43.8 FL (ref 37–54)
EOSINOPHIL # BLD AUTO: 0.1 10*3/MM3 (ref 0–0.4)
EOSINOPHIL NFR BLD AUTO: 3 % (ref 0.3–6.2)
ERYTHROCYTE [DISTWIDTH] IN BLOOD BY AUTOMATED COUNT: 13.7 % (ref 12.3–15.4)
GFR SERPL CREATININE-BSD FRML MDRD: 69 ML/MIN/1.73
GLOBULIN UR ELPH-MCNC: 2.7 GM/DL
GLUCOSE SERPL-MCNC: 149 MG/DL (ref 65–99)
HCT VFR BLD AUTO: 40.2 % (ref 37.5–51)
HGB BLD-MCNC: 14.6 G/DL (ref 13–17.7)
HOLD SPECIMEN: NORMAL
LYMPHOCYTES # BLD AUTO: 1.2 10*3/MM3 (ref 0.7–3.1)
LYMPHOCYTES NFR BLD AUTO: 24.7 % (ref 19.6–45.3)
MAGNESIUM SERPL-MCNC: 1.9 MG/DL (ref 1.6–2.4)
MCH RBC QN AUTO: 33.4 PG (ref 26.6–33)
MCHC RBC AUTO-ENTMCNC: 36.3 G/DL (ref 31.5–35.7)
MCV RBC AUTO: 92 FL (ref 79–97)
MONOCYTES # BLD AUTO: 0.4 10*3/MM3 (ref 0.1–0.9)
MONOCYTES NFR BLD AUTO: 8 % (ref 5–12)
NEUTROPHILS NFR BLD AUTO: 3.2 10*3/MM3 (ref 1.7–7)
NEUTROPHILS NFR BLD AUTO: 63.7 % (ref 42.7–76)
NRBC BLD AUTO-RTO: 0.1 /100 WBC (ref 0–0.2)
NT-PROBNP SERPL-MCNC: 73.3 PG/ML (ref 0–1800)
PLATELET # BLD AUTO: 116 10*3/MM3 (ref 140–450)
PMV BLD AUTO: 8.8 FL (ref 6–12)
POTASSIUM SERPL-SCNC: 4.4 MMOL/L (ref 3.5–5.2)
PROT SERPL-MCNC: 7.2 G/DL (ref 6–8.5)
RBC # BLD AUTO: 4.36 10*6/MM3 (ref 4.14–5.8)
SARS-COV-2 RNA PNL SPEC NAA+PROBE: NOT DETECTED
SODIUM SERPL-SCNC: 136 MMOL/L (ref 136–145)
TROPONIN T SERPL-MCNC: <0.01 NG/ML (ref 0–0.03)
WBC NRBC COR # BLD: 4.9 10*3/MM3 (ref 3.4–10.8)

## 2022-01-18 PROCEDURE — 85025 COMPLETE CBC W/AUTO DIFF WBC: CPT | Performed by: EMERGENCY MEDICINE

## 2022-01-18 PROCEDURE — 99283 EMERGENCY DEPT VISIT LOW MDM: CPT

## 2022-01-18 PROCEDURE — 93005 ELECTROCARDIOGRAM TRACING: CPT | Performed by: EMERGENCY MEDICINE

## 2022-01-18 PROCEDURE — 87635 SARS-COV-2 COVID-19 AMP PRB: CPT | Performed by: EMERGENCY MEDICINE

## 2022-01-18 PROCEDURE — 83880 ASSAY OF NATRIURETIC PEPTIDE: CPT | Performed by: EMERGENCY MEDICINE

## 2022-01-18 PROCEDURE — 93005 ELECTROCARDIOGRAM TRACING: CPT

## 2022-01-18 PROCEDURE — 71045 X-RAY EXAM CHEST 1 VIEW: CPT

## 2022-01-18 PROCEDURE — 83735 ASSAY OF MAGNESIUM: CPT | Performed by: EMERGENCY MEDICINE

## 2022-01-18 PROCEDURE — 80053 COMPREHEN METABOLIC PANEL: CPT | Performed by: EMERGENCY MEDICINE

## 2022-01-18 PROCEDURE — 85379 FIBRIN DEGRADATION QUANT: CPT | Performed by: EMERGENCY MEDICINE

## 2022-01-18 PROCEDURE — 84484 ASSAY OF TROPONIN QUANT: CPT | Performed by: EMERGENCY MEDICINE

## 2022-01-18 NOTE — DISCHARGE INSTRUCTIONS
Limit exertional activities until cleared by cardiology.  Return to the emergency room if shortness of air reoccurs, chest pain, or any other concerns.

## 2022-01-18 NOTE — ED NOTES
"Pt yelling at staff in the [place].  Demanding to be d/c.  Nurses had to wait for d/c paperwork to be completed.  Nurse explained this to him.  Pt yelled at nurse \"I am hungry, there are no blankets or bathrooms in this room\".  Nurse explained to pt that we had a restroom down the sagast[place] and we could have brought him a blanket had staff known his desires.  Pt sts \"I was not going to use the call light and didn't bathroom or for a blanket, I shouldn't have to ask\".  Nurse attempted to reason with pt and explained his d/c instructions.  Pt verbalized understanding.     Tigist Pena, GERARDO  01/18/22 0431    "

## 2022-01-18 NOTE — ED PROVIDER NOTES
Subjective   History of Present Illness  Shortness of air  76-year-old male states he was at the gym yesterday and was more short of breath than usual while exercising.  He states he felt like he only get 1/2 breath.  States he felt anxious.  He states the symptoms improved after he stopped working out.  He reports no cough fever or chest pain or palpitation or syncope.  He states he feels fine today.  Review of Systems   Constitutional: Negative for fever.   HENT: Negative.    Eyes: Negative.    Respiratory: Positive for shortness of breath. Negative for cough.    Cardiovascular: Negative.    Gastrointestinal: Negative.    Genitourinary: Negative.    Musculoskeletal: Negative.    Skin: Negative.    Neurological: Negative.    Psychiatric/Behavioral: Negative.        Past Medical History:   Diagnosis Date   • Abnormal laboratory test 03/26/2018   • Alcoholic polyneuropathy (HCC)    • Allergic rhinitis 05/01/2013   • Anxiety 05/23/2016   • Arthritis 12/06/2016   • Benign prostatic hyperplasia    • Bitten by dog, initial encounter 10/16/2018   • Body mass index 30.0-30.9, adult 12/22/2017   • Body mass index 31.0-31.9, adult 01/16/2018   • Brain mass    • Callus of foot 12/22/2016    Calluses, feet, bilateral   • Cancer (Formerly [facility])     brain   • Cellulitis and abscess of leg 12/12/2017   • COPD (chronic obstructive pulmonary disease) (Formerly [facility])    • Cough 12/06/2016   • Degenerative joint disease of knee, right 03/26/2018    primary localized degenerative joint disease of right knee.    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 However diet modification and compliance with medication is discussed with the patient. Patient is advised to have periodic AST, ALT, and lipid panel testing through your office.    • Hypertension 05/01/2013    Benign. 12-14-18- patient's blood pressure is within desireable ranges. At this time, I would not recommend any change in antihypertensive regimen. However, patient is advised to check the blood pressure periodically at home and bring the logbook on next visit. Patient is also encouraged to increase aerobic activities as tolerated. Risk factor modification is discussed.    • Hypothyroidism    • Impotence of organic origin    • Insomnia    • Internal derangement of knee 05/05/2014 6-9-14- He saw Dr. Sams and Dr. Concepcion. He had fluid drained from his knee with negative cultures and negative crystals. Both were not concerned about the MRI (report not avilable), but he has a radiologist friend who read it as having meniscal damage. He is offered referral back to the orthopedist of his choice and will thnk about who he wants to see. He has upcoming shoulder surgery.    • Knee pain, right 05/04/2017   • Knee pain, right 04/03/2017   • Low back pain    • Medication monitoring encounter 04/19/2019   • Meningioma (HCC) 06/09/2014   • Nasal polyp 10/06/2017   • Neoplasm of uncertain behavior 10/11/2018   • Neoplasm of uncertain behavior of skin 05/04/2015   • Neuropathy 12/18/2014   • Obesity    • Orthopedic aftercare 06/12/2017   • Other fatigue 12/4/2019   • Overweight 12/12/2017   • Pain in joint of right ankle 05/31/2017   • Pain management     [facility]    • Palpitations 12/22/2017    3-16-18- his symptom of palpitation have  improved. Continue beta-blockers.    • Pre-op exam 2018   • Prostatic hypertrophy 2014    Benign   • Puncture wound without foreign body of left hand, initial encounter 10/16/2018   • Rash 2017   • Rotator cuff tear 2014    left   • Shortness of breath 2018   • Sinus tachycardia 2018    12-14-18- much better. Continue current treat.    • Tobacco use        No Known Allergies    Past Surgical History:   Procedure Laterality Date   • ANKLE HARDWARE REMOVAL Right    • APPENDECTOMY     • COLONOSCOPY      every 3 years with [facility]   • EYE SURGERY Bilateral     cataracts   • JOINT REPLACEMENT Right 2019    knee   • KNEE ARTHROSCOPY  2017    and 2014. Dr Gallego. With partial medial and lateral miniscectomy.    • KNEE ARTHROSCOPY Right 2017   • KNEE ARTHROSCOPY W/ MENISCECTOMY Right 2018    right knee scope/meniscectomy   • LUMBAR DISC SURGERY  2015    Right L3-4 diskectomy. Dr. Pollard.    • ROTATOR CUFF REPAIR  2014    Dr Gallego.   • SHOULDER SURGERY Left    • THYROIDECTOMY  1971   • TONSILLECTOMY  1966   • TOTAL KNEE ARTHROPLASTY Right 2019    Right TKR   • TOTAL KNEE ARTHROPLASTY Right 2019    Dr. Crain       Family History   Problem Relation Age of Onset   • Diabetes Mother    • Stroke Mother    • Other Mother         Thyroid Disease   • Heart disease Mother         Ischemic   • Cancer Mother    • COPD Mother    • Hypertension Father    • Heart disease Father         Ischemic   • Hyperlipidemia Father    • Cancer Father    • Arthritis Paternal Grandmother    • Cancer Sister    • Diabetes Maternal Grandmother    • Hearing loss Sister        Social History     Socioeconomic History   • Marital status:    Tobacco Use   • Smoking status: Former Smoker     Packs/day: 1.00     Years: 40.00     Pack years: 40.00     Start date: 1980     Quit date: 2020     Years since quittin.0   • Smokeless tobacco: Never Used   • Tobacco  "comment: quit 1/2020   Vaping Use   • Vaping Use: Never used   Substance and Sexual Activity   • Alcohol use: Yes     Types: 2 Glasses of wine per week   • Drug use: No   • Sexual activity: Not Currently     Partners: Female       Prior to Admission medications    Medication Sig Start Date End Date Taking? Authorizing Provider   apixaban (Eliquis) 5 MG tablet tablet Take 5 mg bid 12/13/21   Samantha Knutson APRN   atorvastatin (LIPITOR) 40 MG tablet Take 1 tablet by mouth Daily. 4/16/21   Samantha Knutson APRN   baclofen (LIORESAL) 10 MG tablet TAKE 1 TABLET TWICE DAILY AS NEEDED 3/31/21   Jl Guidry MD   fenofibrate (Tricor) 145 MG tablet Take 1 tablet by mouth Daily. 8/4/21   Samantha Knutson APRN   gabapentin (NEURONTIN) 300 MG capsule 1 every morning, 1 every afternoon, and 3 nightly as needed 12/23/20   Jl Guidry MD   gabapentin (NEURONTIN) 600 MG tablet  11/5/21   Kwesi Vargas MD   glucosamine-chondroitin 500-400 MG capsule capsule Take  by mouth 3 (Three) Times a Day With Meals.    Kwesi Vargas MD   levothyroxine (SYNTHROID, LEVOTHROID) 200 MCG tablet Take 1 tablet by mouth Daily. 6/8/21   Samantha Knutson APRN   metoprolol tartrate (LOPRESSOR) 50 MG tablet Take 1 tablet by mouth 2 (Two) Times a Day. 8/4/21   Samantha Knutson APRN   Myrbetriq 50 MG tablet sustained-release 24 hour 24 hr tablet  9/24/20   Kwesi Vargas MD   valsartan (DIOVAN) 160 MG tablet  8/2/21   Kwesi Vargas MD     /65   Pulse 61   Temp 97.5 °F (36.4 °C) (Oral)   Resp 20   Ht 177.8 cm (70\")   Wt 99.8 kg (220 lb)   SpO2 96%   BMI 31.57 kg/m²   I examined the patient using the appropriate personal protective equipment.        Objective   Physical Exam  General: Well-developed well-appearing, no acute distress, alert and appropriate  Eyes:  sclera nonicteric  HEENT: Mucous membranes moist, no mucosal swelling  Neck: Supple, no nuchal rigidity, no soft tissue swelling  Respirations: " Respirations nonlabored, equal breath sounds bilaterally, clear lungs  Heart regular rate and rhythm, no murmurs rubs or gallops,   Abdomen soft nontender nondistended,   Extremities no clubbing cyanosis or edema, calves are symmetric and nontender  Neuro cranial nerves grossly intact, no focal limb deficits  Psych oriented, pleasant affect  Skin no rash, brisk cap refill  Procedures           ED Course         My EKG interpretation sinus bradycardia rate of 59, borderline first-degree AV block, no significant change from 8/28/2018        Results for orders placed or performed during the hospital encounter of 01/18/22   COVID-19,CEPHEID/BRETT,COR/GLENYS/PAD/[facility]ANG IN-HOUSE(OR EMERGENT/ADD-ON),NP SWAB IN TRANSPORT MEDIA 3-4 HR TAT, RT-PCR - Swab, Nasopharynx    Specimen: Nasopharynx; Swab   Result Value Ref Range    COVID19 Not Detected Not Detected - Ref. Range   Comprehensive Metabolic Panel    Specimen: Blood   Result Value Ref Range    Glucose 149 (H) 65 - 99 mg/dL    BUN 16 8 - 23 mg/dL    Creatinine 1.05 0.76 - 1.27 mg/dL    Sodium 136 136 - 145 mmol/L    Potassium 4.4 3.5 - 5.2 mmol/L    Chloride 101 98 - 107 mmol/L    CO2 23.0 22.0 - 29.0 mmol/L    Calcium 9.1 8.6 - 10.5 mg/dL    Total Protein 7.2 6.0 - 8.5 g/dL    Albumin 4.50 3.50 - 5.20 g/dL    ALT (SGPT) 42 (H) 1 - 41 U/L    AST (SGOT) 40 1 - 40 U/L    Alkaline Phosphatase 38 (L) 39 - 117 U/L    Total Bilirubin 0.7 0.0 - 1.2 mg/dL    eGFR Non African Amer 69 >60 mL/min/1.73    Globulin 2.7 gm/dL    A/G Ratio 1.7 g/dL    BUN/Creatinine Ratio 15.2 7.0 - 25.0    Anion Gap 12.0 5.0 - 15.0 mmol/L   Troponin    Specimen: Blood   Result Value Ref Range    Troponin T <0.010 0.000 - 0.030 ng/mL   BNP    Specimen: Blood   Result Value Ref Range    proBNP 73.3 0.0-1,800.0 pg/mL   D-dimer, Quantitative    Specimen: Blood   Result Value Ref Range    D-Dimer, Quantitative 0.21 0.00 - 0.59 mg/L (FEU)   Magnesium    Specimen: Blood   Result Value Ref Range    Magnesium 1.9 1.6  - 2.4 mg/dL   CBC Auto Differential    Specimen: Blood   Result Value Ref Range    WBC 4.90 3.40 - 10.80 10*3/mm3    RBC 4.36 4.14 - 5.80 10*6/mm3    Hemoglobin 14.6 13.0 - 17.7 g/dL    Hematocrit 40.2 37.5 - 51.0 %    MCV 92.0 79.0 - 97.0 fL    MCH 33.4 (H) 26.6 - 33.0 pg    MCHC 36.3 (H) 31.5 - 35.7 g/dL    RDW 13.7 12.3 - 15.4 %    RDW-SD 43.8 37.0 - 54.0 fl    MPV 8.8 6.0 - 12.0 fL    Platelets 116 (L) 140 - 450 10*3/mm3    Neutrophil % 63.7 42.7 - 76.0 %    Lymphocyte % 24.7 19.6 - 45.3 %    Monocyte % 8.0 5.0 - 12.0 %    Eosinophil % 3.0 0.3 - 6.2 %    Basophil % 0.6 0.0 - 1.5 %    Neutrophils, Absolute 3.20 1.70 - 7.00 10*3/mm3    Lymphocytes, Absolute 1.20 0.70 - 3.10 10*3/mm3    Monocytes, Absolute 0.40 0.10 - 0.90 10*3/mm3    Eosinophils, Absolute 0.10 0.00 - 0.40 10*3/mm3    Basophils, Absolute 0.00 0.00 - 0.20 10*3/mm3    nRBC 0.1 0.0 - 0.2 /100 WBC   ECG 12 Lead   Result Value Ref Range    QT Interval 436 ms   Gold Top - SST   Result Value Ref Range    Extra Tube Hold for add-ons.      XR Chest 1 View    Result Date: 1/18/2022  No acute chest findings.  Electronically Signed By-Nallely Mcbride MD On:1/18/2022 2:27 PM This report was finalized on [id number] by  Nallely Mcbride MD.                                       MDM  Patient presents having had an episode of dyspnea yesterday while working out.  He is asymptomatic today.  Troponin was normal, D-dimer screen normal.  EKG shows no acute ischemic change.  COVID screen negative.  Patient is chronically anticoagulated on Eliquis.  He was stable throughout the emergency room course and discharged good condition and was advised to limit his exertional activity until follow-up with cardiology for further outpatient testing and was given warning signs for return.  Final diagnoses:   Dyspnea, unspecified type       ED Disposition  ED Disposition     ED Disposition Condition Comment    Discharge Stable           Dakotah Riley MD  [address] [facility]  [place] [address]  [phone]    Schedule an appointment as soon as possible for a visit in 2 days           Medication List      No changes were made to your prescriptions during this visit.          Petar Santos MD  01/18/22 152

## 2022-01-20 LAB — QT INTERVAL: 436 MS

## 2022-01-21 NOTE — PROGRESS NOTES
Hematology/Oncology Outpatient Consultation    Patient name: Shai Chery  : 1945  MRN: [id number]  Primary Care Physician: Samantha Knutson APRN  Referring Physician: Samantha Knutson APRN  Reason For Consult:     Chief Complaint   Patient presents with   • Appointment     Thrombocytopenia       History of Present Illness:      This is a 76-year-old male who has been referred secondary to thrombocytopenia.  Review of his CBC from 2022 showed a white count of 4.9, hemoglobin 14.6, MCV was normal at 92 and platelets were 116.  Over the past 3 years his platelets have ranged between 116 and 130.  He has had normal differentials as well.  Has not been any evidence of leukoerythroblastic changes..  On 3/10/2021 he had liver function test which were essentially unremarkable.  BUN was 20, creatinine is 1.1.  He denies any bleeding issues.    Patient was evaluated by us a few years ago for thrombocytopenia.  I do not have his old records to review at this time.    A few weeks ago he was diagnosed with pulmonary embolism.  Patient states that he maintains an active lifestyle.  He is up-to-date on his age-appropriate cancer screening including colonoscopy and prostate evaluations.  He does not have any significant issues at this time.  He remains on Eliquis        Past Medical History:   Diagnosis Date   • Abnormal laboratory test 2018   • Alcoholic polyneuropathy (HCC)    • Allergic rhinitis 2013   • Anxiety 2016   • Arthritis 2016   • Benign prostatic hyperplasia    • Bitten by dog, initial encounter 10/16/2018   • Body mass index 30.0-30.9, adult 2017   • Body mass index 31.0-31.9, adult 2018   • Brain mass    • Callus of foot 2016    Calluses, feet, bilateral   • Cancer (HCC)     brain   • Cellulitis and abscess of leg 2017   • COPD (chronic obstructive pulmonary disease) (HCC)    • Cough 2016   • Degenerative joint disease of knee, right 2018     primary localized degenerative joint disease of right knee.    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 However diet modification and compliance with medication is discussed with the patient. Patient is advised to have periodic AST, ALT, and lipid panel testing through your office.    • Hypertension 05/01/2013    Benign. 12-14-18- patient's blood pressure is within desireable ranges. At this time, I would not recommend any change in antihypertensive regimen. However, patient is advised to check the blood pressure periodically at home and bring the logbook on next visit. Patient is also encouraged to increase aerobic activities as tolerated. Risk factor modification is discussed.    • Hypothyroidism    • Impotence of organic origin    • Insomnia    • Internal derangement of knee 05/05/2014 6-9-14- He saw Dr. Sams and Dr. Concepcion. He had fluid drained from his knee with negative cultures and negative crystals. Both were not concerned about the MRI (report not avilable), but he has a radiologist friend who read it as having meniscal damage. He is offered referral back to the orthopedist of his choice and will thnk about who he wants to see. He has upcoming shoulder surgery.    • Knee pain, right 05/04/2017   • Knee pain, right 04/03/2017   • Low  back pain    • Medication monitoring encounter 04/19/2019   • Meningioma (HCC) 06/09/2014   • Nasal polyp 10/06/2017   • Neoplasm of uncertain behavior 10/11/2018   • Neoplasm of uncertain behavior of skin 05/04/2015   • Neuropathy 12/18/2014   • Obesity    • Orthopedic aftercare 06/12/2017   • Other fatigue 12/4/2019   • Overweight 12/12/2017   • Pain in joint of right ankle 05/31/2017   • Pain management     [facility]    • Palpitations 12/22/2017    3-16-18- his symptom of palpitation have improved. Continue beta-blockers.    • Pre-op exam 12/18/2018   • Prostatic hypertrophy 06/09/2014    Benign   • Puncture wound without foreign body of left hand, initial encounter 10/16/2018   • Rash 06/28/2017   • Rotator cuff tear 05/19/2014    left   • Shortness of breath 02/02/2018   • Sinus tachycardia 03/16/2018 12-14-18- much better. Continue current treat.    • Tobacco use        Past Surgical History:   Procedure Laterality Date   • ANKLE HARDWARE REMOVAL Right    • APPENDECTOMY  1977   • COLONOSCOPY      every 3 years with [facility]   • EYE SURGERY Bilateral     cataracts   • JOINT REPLACEMENT Right 2019    knee   • KNEE ARTHROSCOPY  06/09/2017    and 12/5/2014. Dr Gallego. With partial medial and lateral miniscectomy.    • KNEE ARTHROSCOPY Right 06/09/2017   • KNEE ARTHROSCOPY W/ MENISCECTOMY Right 09/18/2018    right knee scope/meniscectomy   • LUMBAR DISC SURGERY  11/11/2015    Right L3-4 diskectomy. Dr. Pollard.    • ROTATOR CUFF REPAIR  07/2014    Dr Gallego.   • SHOULDER SURGERY Left    • THYROIDECTOMY  1971   • TONSILLECTOMY  1966   • TOTAL KNEE ARTHROPLASTY Right 01/16/2019    Right TKR   • TOTAL KNEE ARTHROPLASTY Right 01/16/2019    Dr. Crain         Current Outpatient Medications:   •  apixaban (Eliquis) 5 MG tablet tablet, Take 5 mg bid, Disp: 180 tablet, Rfl: 1  •  atorvastatin (LIPITOR) 40 MG tablet, Take 1 tablet by mouth Daily., Disp: , Rfl:   •  baclofen (LIORESAL) 10 MG tablet, TAKE 1 TABLET  TWICE DAILY AS NEEDED, Disp: 60 tablet, Rfl: 2  •  fenofibrate (Tricor) 145 MG tablet, Take 1 tablet by mouth Daily., Disp: 90 tablet, Rfl: 3  •  gabapentin (NEURONTIN) 300 MG capsule, 1 every morning, 1 every afternoon, and 3 nightly as needed, Disp: 75 capsule, Rfl: 0  •  gabapentin (NEURONTIN) 600 MG tablet, , Disp: , Rfl:   •  glucosamine-chondroitin 500-400 MG capsule capsule, Take  by mouth 3 (Three) Times a Day With Meals., Disp: , Rfl:   •  levothyroxine (SYNTHROID, LEVOTHROID) 200 MCG tablet, Take 1 tablet by mouth Daily., Disp: 90 tablet, Rfl: 3  •  metoprolol tartrate (LOPRESSOR) 50 MG tablet, Take 1 tablet by mouth 2 (Two) Times a Day., Disp: 180 tablet, Rfl: 0  •  Myrbetriq 50 MG tablet sustained-release 24 hour 24 hr tablet, , Disp: , Rfl:   •  valsartan (DIOVAN) 160 MG tablet, , Disp: , Rfl:     No Known Allergies    Immunization History   Administered Date(s) Administered   • COVID-19 (PFIZER) PURPLE CAP 02/06/2021, 02/27/2021, 09/30/2021   • FLUAD TRI 65YR+ 10/21/2019   • Flu Vaccine Intradermal Quad 18-64YR 10/07/2013, 09/15/2015   • Flu Vaccine Quad PF >18YRS 12/04/2012, 09/09/2013   • Fluzone High Dose =>65 Years ([facility] ONLY) 10/07/2016, 10/10/2017, 09/20/2018, 09/24/2020   • Fluzone High-Dose 65+yrs 10/06/2021   • Influenza Quad Vaccine (Inpatient) 12/04/2012, 09/09/2013   • Influenza TIV (IM) 11/27/2006, 03/03/2008   • Pneumococcal Conjugate 13-Valent (PCV13) 10/11/2018   • Pneumococcal Polysaccharide (PPSV23) 10/24/2013   • Shingrix 01/30/2020, 02/03/2020, 06/22/2020   • Td 05/03/2008   • Tdap 05/03/2008   • Zostavax 10/16/2014       Family History   Problem Relation Age of Onset   • Diabetes Mother    • Stroke Mother    • Other Mother         Thyroid Disease   • Heart disease Mother         Ischemic   • Cancer Mother    • COPD Mother    • Hypertension Father    • Heart disease Father         Ischemic   • Hyperlipidemia Father    • Cancer Father    • Arthritis Paternal Grandmother    •  "Cancer Sister    • Diabetes Maternal Grandmother    • Hearing loss Sister        Cancer-related family history includes Cancer in his father, mother, and sister.    Social History     Tobacco Use   • Smoking status: Former Smoker     Packs/day: 1.00     Years: 40.00     Pack years: 40.00     Start date: 1980     Quit date: 2020     Years since quittin.0   • Smokeless tobacco: Never Used   • Tobacco comment: quit 2020   Vaping Use   • Vaping Use: Never used   Substance Use Topics   • Alcohol use: Yes     Types: 2 Glasses of wine per week   • Drug use: No       ROS:    Review of Systems   Constitutional: Negative for chills and fever.   HENT: Negative for ear pain, mouth sores, nosebleeds and sore throat.    Eyes: Negative for photophobia and visual disturbance.   Respiratory: Negative for wheezing and stridor.    Cardiovascular: Negative for chest pain and palpitations.   Gastrointestinal: Negative for abdominal pain, diarrhea, nausea and vomiting.   Endocrine: Negative for cold intolerance and heat intolerance.   Genitourinary: Negative for dysuria and hematuria.   Musculoskeletal: Negative for joint swelling and neck stiffness.   Skin: Negative for color change and rash.   Neurological: Negative for seizures and syncope.   Hematological: Negative for adenopathy.        No obvious bleeding   Psychiatric/Behavioral: Negative for agitation, confusion and hallucinations.       Objective:    Vitals:    22 1324   BP: 135/83   Pulse: 60   Resp: 18   Temp: 97.4 °F (36.3 °C)   TempSrc: Infrared   Weight: 95.3 kg (210 lb)   Height: 177.8 cm (70\")   PainSc: 0-No pain     Body mass index is 30.13 kg/m².    ECOG  (0) Fully active, able to carry on all predisease performance without restriction    Physical Exam:  Physical Exam  Vitals and nursing note reviewed.   Constitutional:       General: He is not in acute distress.     Appearance: He is not diaphoretic.   HENT:      Head: Normocephalic and atraumatic. "   Eyes:      General: No scleral icterus.        Right eye: No discharge.         Left eye: No discharge.      Conjunctiva/sclera: Conjunctivae normal.   Neck:      Thyroid: No thyromegaly.   Cardiovascular:      Rate and Rhythm: Normal rate and regular rhythm.      Heart sounds: Normal heart sounds. No friction rub. No gallop.    Pulmonary:      Effort: Pulmonary effort is normal. No respiratory distress.      Breath sounds: No stridor. No wheezing.   Abdominal:      General: Bowel sounds are normal.      Palpations: Abdomen is soft. There is no mass.      Tenderness: There is no abdominal tenderness. There is no guarding or rebound.   Musculoskeletal:         General: No tenderness. Normal range of motion.      Cervical back: Normal range of motion and neck supple.   Lymphadenopathy:      Cervical: No cervical adenopathy.   Skin:     General: Skin is warm.      Findings: No erythema or rash.   Neurological:      Mental Status: He is alert and oriented to person, place, and time.      Motor: No abnormal muscle tone.   Psychiatric:         Behavior: Behavior normal.         RECENT LABS  WBC   Date Value Ref Range Status   01/24/2022 4.62 3.40 - 10.80 10*3/mm3 Final   08/31/2020 4.7 3.4 - 10.8 x10E3/uL Final     RBC   Date Value Ref Range Status   01/24/2022 4.52 4.14 - 5.80 10*6/mm3 Final   08/31/2020 4.75 4.14 - 5.80 x10E6/uL Final     Hemoglobin   Date Value Ref Range Status   01/24/2022 14.2 13.0 - 17.7 g/dL Final     Hematocrit   Date Value Ref Range Status   01/24/2022 42.3 37.5 - 51.0 % Final     MCV   Date Value Ref Range Status   01/24/2022 93.6 79.0 - 97.0 fL Final     MCH   Date Value Ref Range Status   01/24/2022 31.4 26.6 - 33.0 pg Final     MCHC   Date Value Ref Range Status   01/24/2022 33.6 31.5 - 35.7 g/dL Final     RDW   Date Value Ref Range Status   01/24/2022 13.4 12.3 - 15.4 % Final     RDW-SD   Date Value Ref Range Status   01/24/2022 44.1 37.0 - 54.0 fl Final     MPV   Date Value Ref Range  Status   01/24/2022 10.2 6.0 - 12.0 fL Final     Platelets   Date Value Ref Range Status   01/24/2022 129 (L) 140 - 450 10*3/mm3 Final     Platelets (Citrated Blood)   Date Value Ref Range Status   01/24/2022 117 (L) 140 - 450 10*3/mm3 Final     Neutrophil %   Date Value Ref Range Status   01/24/2022 51.9 42.7 - 76.0 % Final     Lymphocyte %   Date Value Ref Range Status   01/24/2022 30.1 19.6 - 45.3 % Final     Monocyte %   Date Value Ref Range Status   01/24/2022 11.3 5.0 - 12.0 % Final     Eosinophil %   Date Value Ref Range Status   01/24/2022 4.3 0.3 - 6.2 % Final     Basophil %   Date Value Ref Range Status   01/24/2022 2.4 (H) 0.0 - 1.5 % Final     Neutrophils, Absolute   Date Value Ref Range Status   01/24/2022 2.40 1.70 - 7.00 10*3/mm3 Final     Lymphocytes, Absolute   Date Value Ref Range Status   01/24/2022 1.39 0.70 - 3.10 10*3/mm3 Final     Monocytes, Absolute   Date Value Ref Range Status   01/24/2022 0.52 0.10 - 0.90 10*3/mm3 Final     Eosinophils, Absolute   Date Value Ref Range Status   01/24/2022 0.20 0.00 - 0.40 10*3/mm3 Final     Basophils, Absolute   Date Value Ref Range Status   01/24/2022 0.11 0.00 - 0.20 10*3/mm3 Final     nRBC   Date Value Ref Range Status   01/18/2022 0.1 0.0 - 0.2 /100 WBC Final       Lab Results   Component Value Date    GLUCOSE 149 (H) 01/18/2022    BUN 16 01/18/2022    CREATININE 1.05 01/18/2022    EGFRIFNONA 69 01/18/2022    EGFRIFAFRI 76 03/10/2021    BCR 15.2 01/18/2022    K 4.4 01/18/2022    CO2 23.0 01/18/2022    CALCIUM 9.1 01/18/2022    PROTENTOTREF 6.8 03/10/2021    ALBUMIN 4.50 01/18/2022    LABIL2 2.0 03/10/2021    AST 40 01/18/2022    ALT 42 (H) 01/18/2022         Assessment/Plan   Thrombocytopenia (HCC)  - Platelet Ct On Citrated Bld  - Methylmalonic Acid, Serum  - RASHMI  - Protein Elec + Interp, Serum  - Reticulocytes  - Haptoglobin  - Folate  - Vitamin B12  - Lactate Dehydrogenase  - aPTT  - Protime-INR  - Fibrinogen  - CBC & Differential  - Platelet Ct On  Citrated Bld  - Methylmalonic Acid, Serum  - RASHMI  - Protein Elec + Interp, Serum  - Reticulocytes  - Haptoglobin  - Folate  - Vitamin B12  - Lactate Dehydrogenase  - aPTT  - Protime-INR  - Fibrinogen      1. Chronic thrombocytopenia with platelets ranging between 116 230,000.  His CBCs have not shown any evidence of leukoerythroblastic changes.  Possibilities include chronic ITP.  There is also a history of platelet clumps in the past.  I have requested for his records from [facility]  2. Recent diagnosis of pulmonary embolism, currently on Eliquis.  This was unprovoked.  Patient has an active lifestyle.  Patient states he is up-to-date on age-appropriate cancer screening including colonoscopy and prostate exam      Plans    · I have requested for his records from [facility]  · Repeat CBC in a noncitrate bottle to eliminate the effect of clumping  · Thrombocytopenia work-up today  · Will need labs in the future for his pulmonary embolism  · Discussed with patient  · Follow-up in 4 weeks    Patient verbalized understanding and is in agreement of the above plan.            I spent 45 total minutes, face-to-face, caring for Shai today.  80% of this time involved counseling and/or coordination of care as documented within this note.

## 2022-01-24 ENCOUNTER — APPOINTMENT (OUTPATIENT)
Dept: LAB | Facility: HOSPITAL | Age: 77
End: 2022-01-24

## 2022-01-24 ENCOUNTER — CONSULT (OUTPATIENT)
Dept: ONCOLOGY | Facility: CLINIC | Age: 77
End: 2022-01-24

## 2022-01-24 VITALS
DIASTOLIC BLOOD PRESSURE: 83 MMHG | WEIGHT: 210 LBS | SYSTOLIC BLOOD PRESSURE: 135 MMHG | HEIGHT: 70 IN | HEART RATE: 60 BPM | TEMPERATURE: 97.4 F | BODY MASS INDEX: 30.06 KG/M2 | RESPIRATION RATE: 18 BRPM

## 2022-01-24 DIAGNOSIS — D69.6 THROMBOCYTOPENIA: Primary | ICD-10-CM

## 2022-01-24 LAB
APTT PPP: 29.8 SECONDS (ref 24–31)
BASOPHILS # BLD AUTO: 0.11 10*3/MM3 (ref 0–0.2)
BASOPHILS NFR BLD AUTO: 2.4 % (ref 0–1.5)
DEPRECATED RDW RBC AUTO: 44.1 FL (ref 37–54)
EOSINOPHIL # BLD AUTO: 0.2 10*3/MM3 (ref 0–0.4)
EOSINOPHIL NFR BLD AUTO: 4.3 % (ref 0.3–6.2)
ERYTHROCYTE [DISTWIDTH] IN BLOOD BY AUTOMATED COUNT: 13.4 % (ref 12.3–15.4)
FIBRINOGEN PPP-MCNC: 257 MG/DL (ref 210–450)
FOLATE SERPL-MCNC: 14.6 NG/ML (ref 4.78–24.2)
HAPTOGLOB SERPL-MCNC: 71 MG/DL (ref 30–200)
HCT VFR BLD AUTO: 42.3 % (ref 37.5–51)
HGB BLD-MCNC: 14.2 G/DL (ref 13–17.7)
INR PPP: 1.12 (ref 0.93–1.1)
LDH SERPL-CCNC: 176 U/L (ref 135–225)
LYMPHOCYTES # BLD AUTO: 1.39 10*3/MM3 (ref 0.7–3.1)
LYMPHOCYTES NFR BLD AUTO: 30.1 % (ref 19.6–45.3)
MCH RBC QN AUTO: 31.4 PG (ref 26.6–33)
MCHC RBC AUTO-ENTMCNC: 33.6 G/DL (ref 31.5–35.7)
MCV RBC AUTO: 93.6 FL (ref 79–97)
MONOCYTES # BLD AUTO: 0.52 10*3/MM3 (ref 0.1–0.9)
MONOCYTES NFR BLD AUTO: 11.3 % (ref 5–12)
NEUTROPHILS NFR BLD AUTO: 2.4 10*3/MM3 (ref 1.7–7)
NEUTROPHILS NFR BLD AUTO: 51.9 % (ref 42.7–76)
PLATELET # BLD AUTO: 129 10*3/MM3 (ref 140–450)
PLATELETS (CITRATED) BY AUTOMATED COUNT: 117 10*3/MM3 (ref 140–450)
PMV BLD AUTO: 10.2 FL (ref 6–12)
PROTHROMBIN TIME: 12.3 SECONDS (ref 9.6–11.7)
RBC # BLD AUTO: 4.52 10*6/MM3 (ref 4.14–5.8)
RETICS # AUTO: 0.13 10*6/MM3 (ref 0.02–0.13)
RETICS/RBC NFR AUTO: 2.83 % (ref 0.7–1.9)
VIT B12 BLD-MCNC: >2000 PG/ML (ref 211–946)
WBC NRBC COR # BLD: 4.62 10*3/MM3 (ref 3.4–10.8)

## 2022-01-24 PROCEDURE — 83615 LACTATE (LD) (LDH) ENZYME: CPT | Performed by: INTERNAL MEDICINE

## 2022-01-24 PROCEDURE — 85049 AUTOMATED PLATELET COUNT: CPT | Performed by: INTERNAL MEDICINE

## 2022-01-24 PROCEDURE — 85025 COMPLETE CBC W/AUTO DIFF WBC: CPT | Performed by: INTERNAL MEDICINE

## 2022-01-24 PROCEDURE — 36415 COLL VENOUS BLD VENIPUNCTURE: CPT | Performed by: INTERNAL MEDICINE

## 2022-01-24 PROCEDURE — 86038 ANTINUCLEAR ANTIBODIES: CPT | Performed by: INTERNAL MEDICINE

## 2022-01-24 PROCEDURE — 85610 PROTHROMBIN TIME: CPT | Performed by: INTERNAL MEDICINE

## 2022-01-24 PROCEDURE — 82746 ASSAY OF FOLIC ACID SERUM: CPT | Performed by: INTERNAL MEDICINE

## 2022-01-24 PROCEDURE — 99204 OFFICE O/P NEW MOD 45 MIN: CPT | Performed by: INTERNAL MEDICINE

## 2022-01-24 PROCEDURE — 85045 AUTOMATED RETICULOCYTE COUNT: CPT | Performed by: INTERNAL MEDICINE

## 2022-01-24 PROCEDURE — 83010 ASSAY OF HAPTOGLOBIN QUANT: CPT | Performed by: INTERNAL MEDICINE

## 2022-01-24 PROCEDURE — 85384 FIBRINOGEN ACTIVITY: CPT | Performed by: INTERNAL MEDICINE

## 2022-01-24 PROCEDURE — 82607 VITAMIN B-12: CPT | Performed by: INTERNAL MEDICINE

## 2022-01-24 PROCEDURE — 85730 THROMBOPLASTIN TIME PARTIAL: CPT | Performed by: INTERNAL MEDICINE

## 2022-01-25 LAB
ALBUMIN SERPL ELPH-MCNC: 4.2 G/DL (ref 2.9–4.4)
ALBUMIN/GLOB SERPL: 1.4 {RATIO} (ref 0.7–1.7)
ALPHA1 GLOB SERPL ELPH-MCNC: 0.2 G/DL (ref 0–0.4)
ALPHA2 GLOB SERPL ELPH-MCNC: 0.7 G/DL (ref 0.4–1)
B-GLOBULIN SERPL ELPH-MCNC: 1.1 G/DL (ref 0.7–1.3)
GAMMA GLOB SERPL ELPH-MCNC: 0.9 G/DL (ref 0.4–1.8)
GLOBULIN SER CALC-MCNC: 2.9 G/DL (ref 2.2–3.9)
LABORATORY COMMENT REPORT: NORMAL
M PROTEIN SERPL ELPH-MCNC: NORMAL G/DL
PROT PATTERN SERPL ELPH-IMP: NORMAL
PROT SERPL-MCNC: 7.1 G/DL (ref 6–8.5)

## 2022-01-26 LAB — ANA SER QL: NEGATIVE

## 2022-01-30 LAB — METHYLMALONATE SERPL-SCNC: 153 NMOL/L (ref 0–378)

## 2022-02-10 ENCOUNTER — OFFICE VISIT (OUTPATIENT)
Dept: PODIATRY | Facility: CLINIC | Age: 77
End: 2022-02-10

## 2022-02-10 VITALS — BODY MASS INDEX: 30.06 KG/M2 | HEIGHT: 70 IN | WEIGHT: 210 LBS

## 2022-02-10 DIAGNOSIS — L85.3 XEROSIS OF SKIN: ICD-10-CM

## 2022-02-10 DIAGNOSIS — G62.89 OTHER POLYNEUROPATHY: ICD-10-CM

## 2022-02-10 DIAGNOSIS — R23.4 FISSURE IN SKIN OF FOOT: Primary | ICD-10-CM

## 2022-02-10 DIAGNOSIS — B35.1 ONYCHOMYCOSIS: ICD-10-CM

## 2022-02-10 DIAGNOSIS — L97.522 CHRONIC ULCER OF LEFT FOOT WITH FAT LAYER EXPOSED: ICD-10-CM

## 2022-02-10 PROCEDURE — 99214 OFFICE O/P EST MOD 30 MIN: CPT | Performed by: PODIATRIST

## 2022-02-10 PROCEDURE — 11042 DBRDMT SUBQ TIS 1ST 20SQCM/<: CPT | Performed by: PODIATRIST

## 2022-02-10 NOTE — PROGRESS NOTES
02/10/2022  Foot and Ankle Surgery - Established Patient/Follow-up  Provider: Dr. Brett Decker DPM  Location: [facility]    Subjective:  Shai Chery is a 76 y.o. male.     Chief Complaint   Patient presents with   • Left Foot - Callouses   • Right Foot - Callouses   • Follow-up     last pcp appt with ameya villegas corky  12/1/2021       HPI: Patient is a 76-year-old male that I have seen in the past that returns with new issues involving his feet.  Patient states that he has been dealing with calluses and thickened and dystrophic nails which have been managed by a nail salon.  Recently, he has developed a wound involving the plantar aspect of his left foot.  He has been trying to manage the callus and noticed that a chunk of skin opened.  He does have mild discomfort to the area.  He would like to discuss treatment options today.  Otherwise, he has been doing well.  Denies any other issues to the feet.    No Known Allergies    Current Outpatient Medications on File Prior to Visit   Medication Sig Dispense Refill   • apixaban (Eliquis) 5 MG tablet tablet Take 5 mg bid 180 tablet 1   • atorvastatin (LIPITOR) 40 MG tablet Take 1 tablet by mouth Daily.     • baclofen (LIORESAL) 10 MG tablet TAKE 1 TABLET TWICE DAILY AS NEEDED 60 tablet 2   • fenofibrate (Tricor) 145 MG tablet Take 1 tablet by mouth Daily. 90 tablet 3   • gabapentin (NEURONTIN) 300 MG capsule 1 every morning, 1 every afternoon, and 3 nightly as needed 75 capsule 0   • gabapentin (NEURONTIN) 600 MG tablet      • glucosamine-chondroitin 500-400 MG capsule capsule Take  by mouth 3 (Three) Times a Day With Meals.     • levothyroxine (SYNTHROID, LEVOTHROID) 200 MCG tablet Take 1 tablet by mouth Daily. 90 tablet 3   • metoprolol tartrate (LOPRESSOR) 50 MG tablet Take 1 tablet by mouth 2 (Two) Times a Day. 180 tablet 0   • Myrbetriq 50 MG tablet sustained-release 24 hour 24 hr tablet      • valsartan (DIOVAN) 160 MG tablet        No current  "facility-administered medications on file prior to visit.       Objective   Ht 177.8 cm (70\")   Wt 95.3 kg (210 lb)   BMI 30.13 kg/m²     Podiatry Exam       General Appearance:  well nourished; well hydrated; no acute distress  Mental Status Exam        Judgement and Insight:  Intact       Orientation:  Oriented to time, place, and person       Memory:  Intact for recent and remote events       Mood and Affect:  No depression, anxiety, or agitation  Cardiovascular (Bilateral)       Dorsalis Pedis Pulse (BL):  2/4       Posterior Tibialis Pulse (BL):  2/4       Capillary Filling Time (BL):  1-3 Seconds       Edema (BL):  No Edema  Dermatological Exam       Temperature:  warm to warm       Skin Elasticity:  decreased elasticity       Pigmentation:  normal pigmentation       Skin:  normal pigmentation  Skin: Hyperkeratotic lesions to various aspects of both feet.  Fissure noted to the plantar aspect of the left foot with underlying open wound to the plantar aspect of the first metatarsal head region.  No gross signs of infection.  Moderate xerosis to the feet.  Neurological Exam (Bilateral)       Paresthesia (Bl):  Paresthesia; numbness, tingling, and burning       Achilles DTRs (Bl):  diminished       Tinel over Tarsal Tunnel (Bl):  negative       Intermedial Dorsal Cutaneous Nerve (Bl):  negative       Medial Dorsal Cutaneous Nerve (Bl):  negative  Monofilament Test (Bl):   not intact  Additional Neurological Findings  Sensation is diminished with a monofilament testing proximal to the ankles, bilateral. Motor function and coordination are grossly intact.  Hypertrophic Nail Description (Left)       Thickened:  1, 2, 3, 4, 5       >3mm:  1, 2, 3, 4, 5       Ridged:  1, 2, 3, 4, 5       Incurvated:  1       Onychomycosis:  1, 2, 3, 4, 5  Hypertrophic Nail Description (Right)       >3mm:  1, 2, 3, 4, 5       Ridged : 1, 2, 3, 4, 5       Incurvated:  1       Onychomycosis:  1, 2, 3, 4, 5        MusculoSkeletal Exam " (Bilateral)       Gait and Stance (Bl):  Stable, unassisted, and nonantalgic       ROM (Bl):  Ankle and pedal joint range of motion is supple, nontender, crepitus free       Muscle Strength (Bl):  symmetrical 5/5       Dislocated Joints (Bl):  no dislocation of joints       Inflammed Joints (Bl):  no inflammation of joints       Hammertoe Deformity (Bl):  2nd, 3rd, 4th, 5th       Medial Turbicle Calc (Bl):  no pain       Gastroc soleus equinus (Bl):  Inability to dorsiflex past neutral position both straight legged and bent knee       Post tibial tendon (Bl):  no soreness noted       Peroneal Tendon (Bl):  no soreness noted  Additional Musculoskelatal Findings  Prominent metatarsal heads noted to the forefoot region, bilateral. Mild hallux malleus, bilateral. No gross instability.    Assessment/Plan   Diagnoses and all orders for this visit:    1. Fissure in skin of foot (Primary)    2. 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 Xerosis of skin    4. Onychomycosis    5. Other polyneuropathy      Patient presents with new problem involving the plantar aspect of the left foot.  He has been dealing with the calluses on a routine basis but has developed a fissure.  He states that recently, the wound opened causing pain.  Symptoms are gradually improving.  He denies any prominent signs of infection.  After evaluation, patient does have a wound that extends to subcutaneous tissue.  Debridement was performed today without complication.  No concerning features are present.  I have asked that he apply a Betadine dressing on a daily basis.  I do feel that he may proceed with routine pedicures to manage his toenails and other calluses.  I have prescribed him urea 40% topical for him to use on a daily basis.  I would also like him to obtain a pair of over-the-counter arch supports with metatarsal pad for forefoot offloading.  We did review proper use and effects.  Patient is to call with any new issues or  concerns.  I will see him in 2 weeks for reevaluation.  Greater than 30 minutes was spent before, during, and after evaluation for patient care.    Excisional Debridement to subcutaneous tissue: Left foot    Pre ulcer measurement: Fissure/callus    Post ulcer measurement: 0.3 x 0.5 x 0.3cm    Anesthesia: None, as patient is neuropathic    Bleeding: <5cc    Pre-op pain: 0    Post-op pain: 0    Complications: None    Consent and time out was performed before proceeding with the procedure.  Excisional debridement to the level of was performed with a 15 blade and curette without complication.  Viable and nonviable skin, subcutaneous tissue was excised to a healthy bleeding base.  No purulence or proximal extension was identified. Hemostasis was achieved with pressure.  Dry sterile dressings were applied.  Patient tolerated the procedure well.      No orders of the defined types were placed in this encounter.         Note is dictated utilizing voice recognition software. Unfortunately this leads to occasional typographical errors. I apologize in advance if the situation occurs. If questions occur please do not hesitate to call our office.

## 2022-02-21 ENCOUNTER — LAB (OUTPATIENT)
Dept: LAB | Facility: HOSPITAL | Age: 77
End: 2022-02-21

## 2022-02-21 ENCOUNTER — OFFICE VISIT (OUTPATIENT)
Dept: PODIATRY | Facility: CLINIC | Age: 77
End: 2022-02-21

## 2022-02-21 VITALS
DIASTOLIC BLOOD PRESSURE: 88 MMHG | HEART RATE: 65 BPM | HEIGHT: 70 IN | BODY MASS INDEX: 30.06 KG/M2 | WEIGHT: 210 LBS | SYSTOLIC BLOOD PRESSURE: 150 MMHG

## 2022-02-21 DIAGNOSIS — G62.89 OTHER POLYNEUROPATHY: ICD-10-CM

## 2022-02-21 DIAGNOSIS — M79.672 LEFT FOOT PAIN: Primary | ICD-10-CM

## 2022-02-21 DIAGNOSIS — E11.8 DIABETIC FOOT: ICD-10-CM

## 2022-02-21 DIAGNOSIS — R60.1 GENERALIZED EDEMA: ICD-10-CM

## 2022-02-21 DIAGNOSIS — R23.4 FISSURE IN SKIN OF FOOT: ICD-10-CM

## 2022-02-21 LAB
CRP SERPL-MCNC: 22.46 MG/DL (ref 0–0.5)
ERYTHROCYTE [SEDIMENTATION RATE] IN BLOOD: 18 MM/HR (ref 0–20)
HBA1C MFR BLD: 5.2 % (ref 3.5–5.6)
URATE SERPL-MCNC: 6 MG/DL (ref 3.4–7)

## 2022-02-21 PROCEDURE — 83036 HEMOGLOBIN GLYCOSYLATED A1C: CPT

## 2022-02-21 PROCEDURE — 36415 COLL VENOUS BLD VENIPUNCTURE: CPT

## 2022-02-21 PROCEDURE — 85652 RBC SED RATE AUTOMATED: CPT

## 2022-02-21 PROCEDURE — 84550 ASSAY OF BLOOD/URIC ACID: CPT

## 2022-02-21 PROCEDURE — 99214 OFFICE O/P EST MOD 30 MIN: CPT | Performed by: PODIATRIST

## 2022-02-21 PROCEDURE — 86140 C-REACTIVE PROTEIN: CPT

## 2022-02-21 RX ORDER — CEPHALEXIN 500 MG/1
CAPSULE ORAL
COMMUNITY
Start: 2022-02-20 | End: 2022-04-25

## 2022-02-21 RX ORDER — METHYLPREDNISOLONE 4 MG/1
TABLET ORAL
Qty: 21 TABLET | Refills: 0 | Status: SHIPPED | OUTPATIENT
Start: 2022-02-21 | End: 2022-04-25

## 2022-02-22 NOTE — PROGRESS NOTES
02/21/2022  Foot and Ankle Surgery - Established Patient/Follow-up  Provider: Dr. Brett Decker DPM  Location: [facility]    Subjective:  Shai Chery is a 76 y.o. male.     Chief Complaint   Patient presents with   • Left Foot - Pain, Foot Ulcer     Intense pain   • Follow-up     Last pcp appt AUDELIA MARTINEZ 12/13/2021       HPI: Patient returns for evaluation of his left foot wound.  Patient states that the wound is doing quite well and has healed.  He continues to have callus formation which he did obtain the urea topical.  He states that he has been using on a daily basis which has made some improvements.  He does complain of new issue involving the left foot and states that he was on the treadmill and noticed pain involving the midfoot region.  Symptoms have progressed and he rates the pain an 8 out of 10.  He does have redness and swelling involving the dorsal midfoot region.  He states that he has been able to walk with difficulty.  He is unaware of any injury.  He states that he was wearing the power step inserts with activity.  Patient has not had issues like this in the past.    No Known Allergies    Current Outpatient Medications on File Prior to Visit   Medication Sig Dispense Refill   • apixaban (Eliquis) 5 MG tablet tablet Take 5 mg bid 180 tablet 1   • atorvastatin (LIPITOR) 40 MG tablet Take 1 tablet by mouth Daily.     • baclofen (LIORESAL) 10 MG tablet TAKE 1 TABLET TWICE DAILY AS NEEDED 60 tablet 2   • cephalexin (KEFLEX) 500 MG capsule      • fenofibrate (Tricor) 145 MG tablet Take 1 tablet by mouth Daily. 90 tablet 3   • gabapentin (NEURONTIN) 300 MG capsule 1 every morning, 1 every afternoon, and 3 nightly as needed 75 capsule 0   • gabapentin (NEURONTIN) 600 MG tablet      • glucosamine-chondroitin 500-400 MG capsule capsule Take  by mouth 3 (Three) Times a Day With Meals.     • levothyroxine (SYNTHROID, LEVOTHROID) 200 MCG tablet Take 1 tablet by mouth Daily. 90 tablet 3   •  Procedure(s):  ESOPHAGOGASTRODUODENOSCOPY (EGD). MAC    Anesthesia Post Evaluation        Patient participation: complete - patient participated  Level of consciousness: awake  Pain management: adequate  Airway patency: patent  Anesthetic complications: no  Cardiovascular status: hemodynamically stable  Respiratory status: acceptable  Hydration status: acceptable  Comments: The patient is ready for PACU discharge. [address], [place]                   Post anesthesia nausea and vomiting:  controlled      INITIAL Post-op Vital signs:   Vitals Value Taken Time   /102 01/21/21 1800   Temp 37.2 °C (99 °F) 01/21/21 1750   Pulse 123 01/21/21 1807   Resp 21 01/21/21 1807   SpO2 98 % 01/21/21 1807   Vitals shown include unvalidated device data. "metoprolol tartrate (LOPRESSOR) 50 MG tablet Take 1 tablet by mouth 2 (Two) Times a Day. 180 tablet 0   • Myrbetriq 50 MG tablet sustained-release 24 hour 24 hr tablet      • valsartan (DIOVAN) 160 MG tablet        No current facility-administered medications on file prior to visit.       Objective   /88   Pulse 65   Ht 177.8 cm (70\")   Wt 95.3 kg (210 lb)   BMI 30.13 kg/m²     Podiatry Exam       General Appearance:  well nourished; well hydrated; no acute distress  Mental Status Exam        Judgement and Insight:  Intact       Orientation:  Oriented to time, place, and person       Memory:  Intact for recent and remote events       Mood and Affect:  No depression, anxiety, or agitation  Cardiovascular (Bilateral)       Dorsalis Pedis Pulse (BL):  2/4       Posterior Tibialis Pulse (BL):  2/4       Capillary Filling Time (BL):  1-3 Seconds       Edema (BL):  No Edema  Dermatological Exam       Temperature:  warm to warm       Skin Elasticity:  decreased elasticity       Pigmentation:  normal pigmentation       Skin:  normal pigmentation  Skin:  Hyperkeratotic lesions are much improved.  No jelani open wound, fissure, or further concern at this time.  Neurological Exam (Bilateral)       Paresthesia (Bl):  Paresthesia; numbness, tingling, and burning       Achilles DTRs (Bl):  diminished       Tinel over Tarsal Tunnel (Bl):  negative       Intermedial Dorsal Cutaneous Nerve (Bl):  negative       Medial Dorsal Cutaneous Nerve (Bl):  negative  Monofilament Test (Bl):   not intact  Additional Neurological Findings  Sensation is diminished with a monofilament testing proximal to the ankles, bilateral. Motor function and coordination are grossly intact.  Hypertrophic Nail Description (Left)       Thickened:  1, 2, 3, 4, 5       >3mm:  1, 2, 3, 4, 5       Ridged:  1, 2, 3, 4, 5       Incurvated:  1       Onychomycosis:  1, 2, 3, 4, 5  Hypertrophic Nail Description (Right)       >3mm:  1, 2, 3, 4, 5       Ridged : " 1, 2, 3, 4, 5       Incurvated:  1       Onychomycosis:  1, 2, 3, 4, 5        MusculoSkeletal Exam (Bilateral)       Gait and Stance (Bl):  Stable, unassisted, and nonantalgic       ROM (Bl): Limited secondary to guarding       Muscle Strength (Bl):  symmetrical 5/5       Dislocated Joints (Bl):  no dislocation of joints       Inflammed Joints (Bl):  no inflammation of joints       Hammertoe Deformity (Bl):  2nd, 3rd, 4th, 5th       Medial Turbicle Calc (Bl):  no pain       Gastroc soleus equinus (Bl):  Inability to dorsiflex past neutral position both straight legged and bent knee       Post tibial tendon (Bl):  no soreness noted       Peroneal Tendon (Bl):  no soreness noted  Additional Musculoskelatal Findings  Increased pain involving the midfoot with erythema and swelling.  Mild calor.  No progressive deformity or instability.    Assessment/Plan   Diagnoses and all orders for this visit:    1. Left foot pain (Primary)  -     XR Foot 3+ View Left  -     methylPREDNISolone (MEDROL) 4 MG dose pack; Take as directed on package instructions.  Dispense: 21 tablet; Refill: 0    2. Generalized edema  -     Sedimentation Rate; Future  -     Uric Acid; Future  -     C-reactive Protein; Future    3. Fissure in skin of foot    4. Other polyneuropathy  -     Hemoglobin A1c; Future      Patient presents for follow-up regarding his left foot callus.  The fissure and callus have resolved.  He does not have any concerning features to the skin.  I have asked that he continue to use the urea topical on a daily basis.  Patient does however complain of new issues involving the left footafter walking on the treadmill.  He noticed pain and swelling to the midfoot which has gradually progressed.  Today, he rates an 8 out of 10 pain with weightbearing activities.  He does have mild calor to the area.  Imaging was reviewed showing no obvious fracture or dislocation to the area.  Findings are likely secondary to inflammation and could be  due to gout.  I have recommended that we obtain labs to include ESR, CRP, uric acid, and hemoglobin A1c.  I have dispensed a cam boot and asked that he wear the device on a daily basis.  Patient understands and agrees.  He is to avoid high-impact and excessive activity.  I have also prescribed a Medrol Dosepak for symptom management.  We did discuss rice therapy.  Patient is to observe and call with any progressive issues or concerns and I will see him in 2 weeks for reevaluation.  Greater than 30 minutes was spent before, during, and after evaluation for patient care.    Orders Placed This Encounter   Procedures   • XR Foot 3+ View Left     Scheduling Instructions:      Room 10      wb     Order Specific Question:   Reason for Exam:     Answer:   left foot pain     Order Specific Question:   Does this patient have a diabetic monitoring/medication delivering device on?     Answer:   No     Order Specific Question:   Release to patient     Answer:   Immediate   • Hemoglobin A1c     Standing Status:   Future     Number of Occurrences:   1     Standing Expiration Date:   2/22/2023     Order Specific Question:   Release to patient     Answer:   Immediate   • Sedimentation Rate     Standing Status:   Future     Number of Occurrences:   1     Standing Expiration Date:   2/22/2023     Order Specific Question:   Release to patient     Answer:   Immediate   • Uric Acid     Standing Status:   Future     Number of Occurrences:   1     Standing Expiration Date:   2/22/2023     Order Specific Question:   Release to patient     Answer:   Immediate   • C-reactive Protein     Standing Status:   Future     Number of Occurrences:   1     Standing Expiration Date:   2/22/2023     Order Specific Question:   Release to patient     Answer:   Immediate          Note is dictated utilizing voice recognition software. Unfortunately this leads to occasional typographical errors. I apologize in advance if the situation occurs. If questions  occur please do not hesitate to call our office.

## 2022-03-04 NOTE — PROGRESS NOTES
Hematology/Oncology Outpatient progress notes    Patient name: Shai Chery  : 1945  MRN: [id number]  Primary Care Physician: Dorota Acevedo MD  Referring Physician: Samantha Knutson APRN  Reason For Consult:     Chief Complaint   Patient presents with   • Follow-up     Thrombocytopenia       History of Present Illness:      This is a 76-year-old male who has been referred secondary to thrombocytopenia.  Review of his CBC from 2022 showed a white count of 4.9, hemoglobin 14.6, MCV was normal at 92 and platelets were 116.  Over the past 3 years his platelets have ranged between 116 and 130.  He has had normal differentials as well.  Has not been any evidence of leukoerythroblastic changes..  On 3/10/2021 he had liver function test which were essentially unremarkable.  BUN was 20, creatinine is 1.1.  He denies any bleeding issues.    Patient was evaluated by us a few years ago for thrombocytopenia.  I do not have his old records to review at this time.    A few weeks ago he was diagnosed with pulmonary embolism.  Patient states that he maintains an active lifestyle.  He is up-to-date on his age-appropriate cancer screening including colonoscopy and prostate evaluations.  He does not have any significant issues at this time.  He remains on Eliquis      · 2022 patient had methylmalonic acid level which was normal at 153, RASHMI was negative SPEP with TERRA did not reveal any monoclonal protein reticulocyte count was slightly high at 2.83 haptoglobin was normal at 75, folate was 14.6 and B12 level was more than 2000.  LDH was 176 PT PTT INR was unremarkable        Past Medical History:   Diagnosis Date   • Abnormal laboratory test 2018   • Alcoholic polyneuropathy (HCC)    • Allergic rhinitis 2013   • Anxiety 2016   • Arthritis 2016   • Benign prostatic hyperplasia    • Bitten by dog, initial encounter 10/16/2018   • Body mass index 30.0-30.9, adult 2017   • Body  mass index 31.0-31.9, adult 01/16/2018   • Brain mass    • Callus of foot 12/22/2016    Calluses, feet, bilateral   • Cancer (HCC)     brain   • Cellulitis and abscess of leg 12/12/2017   • COPD (chronic obstructive pulmonary disease) (HCC)    • Cough 12/06/2016   • Degenerative joint disease of knee, right 03/26/2018    primary localized degenerative joint disease of right knee.    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 However diet modification and compliance with medication is discussed with the patient. Patient is advised to have periodic AST, ALT, and lipid panel testing through your office.    • Hypertension 05/01/2013    Benign. 12-14-18- patient's blood pressure is within desireable ranges. At this time, I would not recommend any change in antihypertensive regimen. However, patient is advised to check the blood pressure periodically at home and bring the logbook on next visit. Patient is also encouraged to increase aerobic activities as tolerated. Risk factor modification is discussed.    • Hypothyroidism    • Impotence of organic origin    • Insomnia    • Internal derangement of knee 05/05/2014 6-9-14- He saw Dr. Sams and Dr. Concepcion. He had fluid drained from his knee with negative cultures and negative crystals. Both were not  concerned about the MRI (report not avilable), but he has a radiologist friend who read it as having meniscal damage. He is offered referral back to the orthopedist of his choice and will thnk about who he wants to see. He has upcoming shoulder surgery.    • Knee pain, right 05/04/2017   • Knee pain, right 04/03/2017   • Low back pain    • Medication monitoring encounter 04/19/2019   • Meningioma (HCC) 06/09/2014   • Nasal polyp 10/06/2017   • Neoplasm of uncertain behavior 10/11/2018   • Neoplasm of uncertain behavior of skin 05/04/2015   • Neuropathy 12/18/2014   • Obesity    • Orthopedic aftercare 06/12/2017   • Other fatigue 12/4/2019   • Overweight 12/12/2017   • Pain in joint of right ankle 05/31/2017   • Pain management     [facility]    • Palpitations 12/22/2017    3-16-18- his symptom of palpitation have improved. Continue beta-blockers.    • Pre-op exam 12/18/2018   • Prostatic hypertrophy 06/09/2014    Benign   • Puncture wound without foreign body of left hand, initial encounter 10/16/2018   • Rash 06/28/2017   • Rotator cuff tear 05/19/2014    left   • Shortness of breath 02/02/2018   • Sinus tachycardia 03/16/2018 12-14-18- much better. Continue current treat.    • Tobacco use        Past Surgical History:   Procedure Laterality Date   • ANKLE HARDWARE REMOVAL Right    • APPENDECTOMY  1977   • COLONOSCOPY      every 3 years with [facility]   • EYE SURGERY Bilateral     cataracts   • JOINT REPLACEMENT Right 2019    knee   • KNEE ARTHROSCOPY  06/09/2017    and 12/5/2014. Dr Gallego. With partial medial and lateral miniscectomy.    • KNEE ARTHROSCOPY Right 06/09/2017   • KNEE ARTHROSCOPY W/ MENISCECTOMY Right 09/18/2018    right knee scope/meniscectomy   • LUMBAR DISC SURGERY  11/11/2015    Right L3-4 diskectomy. Dr. Pollard.    • ROTATOR CUFF REPAIR  07/2014    Dr Gallego.   • SHOULDER SURGERY Left    • THYROIDECTOMY  1971   • TONSILLECTOMY  1966   • TOTAL KNEE ARTHROPLASTY Right 01/16/2019    Right  TKR   • TOTAL KNEE ARTHROPLASTY Right 01/16/2019    Dr. Crain         Current Outpatient Medications:   •  atorvastatin (LIPITOR) 40 MG tablet, Take 1 tablet by mouth Daily., Disp: , Rfl:   •  baclofen (LIORESAL) 10 MG tablet, TAKE 1 TABLET TWICE DAILY AS NEEDED, Disp: 60 tablet, Rfl: 2  •  cephalexin (KEFLEX) 500 MG capsule, , Disp: , Rfl:   •  diazePAM (VALIUM) 5 MG tablet, TAKE 1 TABLET BY MOUTH THREE TIMES DAILY FOR 1 DAY AS NEEDED FOR ANXIETY TAKE AROUND MRI PROCEDURE AS DIRECTED, Disp: , Rfl:   •  fenofibrate (Tricor) 145 MG tablet, Take 1 tablet by mouth Daily., Disp: 90 tablet, Rfl: 3  •  gabapentin (NEURONTIN) 600 MG tablet, , Disp: , Rfl:   •  glucosamine-chondroitin 500-400 MG capsule capsule, Take  by mouth 3 (Three) Times a Day With Meals., Disp: , Rfl:   •  levothyroxine (SYNTHROID, LEVOTHROID) 200 MCG tablet, Take 1 tablet by mouth Daily., Disp: 90 tablet, Rfl: 3  •  methylPREDNISolone (MEDROL) 4 MG dose pack, Take as directed on package instructions., Disp: 21 tablet, Rfl: 0  •  metoprolol tartrate (LOPRESSOR) 50 MG tablet, Take 1 tablet by mouth 2 (Two) Times a Day., Disp: 180 tablet, Rfl: 0  •  valsartan (DIOVAN) 160 MG tablet, , Disp: , Rfl:   •  apixaban (Eliquis) 5 MG tablet tablet, Take 5 mg bid, Disp: 180 tablet, Rfl: 1  •  clindamycin (CLEOCIN) 300 MG capsule, TAKE 1 CAPSULE BY MOUTH EVERY 6 HOURS FOR 14 DAYS, Disp: , Rfl:     No Known Allergies    Immunization History   Administered Date(s) Administered   • COVID-19 (PFIZER) PURPLE CAP 02/06/2021, 02/27/2021, 09/30/2021   • FLUAD TRI 65YR+ 10/21/2019   • Flu Vaccine Intradermal Quad 18-64YR 10/07/2013, 09/15/2015   • Flu Vaccine Quad PF >18YRS 12/04/2012, 09/09/2013   • Fluzone High Dose =>65 Years ([employer] ONLY) 10/07/2016, 10/10/2017, 09/20/2018, 09/24/2020   • Fluzone High-Dose 65+yrs 10/06/2021   • Influenza Quad Vaccine (Inpatient) 12/04/2012, 09/09/2013   • Influenza TIV (IM) 11/27/2006, 03/03/2008   • Pneumococcal Conjugate 13-Valent  (PCV13) 10/11/2018   • Pneumococcal Polysaccharide (PPSV23) 10/24/2013   • Shingrix 2020, 2020, 2020   • Td 2008   • Tdap 2008   • Zostavax 10/16/2014       Family History   Problem Relation Age of Onset   • Diabetes Mother    • Stroke Mother    • Other Mother         Thyroid Disease   • Heart disease Mother         Ischemic   • Cancer Mother    • COPD Mother    • Hypertension Father    • Heart disease Father         Ischemic   • Hyperlipidemia Father    • Cancer Father    • Arthritis Paternal Grandmother    • Cancer Sister    • Diabetes Maternal Grandmother    • Hearing loss Sister        Cancer-related family history includes Cancer in his father, mother, and sister.    Social History     Tobacco Use   • Smoking status: Former Smoker     Packs/day: 1.00     Years: 40.00     Pack years: 40.00     Start date: 1980     Quit date: 2020     Years since quittin.1   • Smokeless tobacco: Never Used   • Tobacco comment: quit 2020   Vaping Use   • Vaping Use: Never used   Substance Use Topics   • Alcohol use: Yes     Types: 2 Glasses of wine per week   • Drug use: No     I have reviewed and confirmed the accuracy of the patient's history: Chief complaint, HPI, ROS and Subjective as entered by the MA/LPN/RN. Krysten Colon MD 22       ROS:    Review of Systems   Constitutional: Negative for chills and fever.   HENT: Negative for ear pain, mouth sores, nosebleeds and sore throat.    Eyes: Negative for photophobia and visual disturbance.   Respiratory: Negative for wheezing and stridor.    Cardiovascular: Negative for chest pain and palpitations.   Gastrointestinal: Negative for abdominal pain, diarrhea, nausea and vomiting.   Endocrine: Negative for cold intolerance and heat intolerance.   Genitourinary: Negative for dysuria and hematuria.   Musculoskeletal: Negative for joint swelling and neck stiffness.   Skin: Negative for color change and rash.   Neurological:  "Negative for seizures and syncope.   Hematological: Negative for adenopathy.        No obvious bleeding   Psychiatric/Behavioral: Negative for agitation, confusion and hallucinations.       Objective:    Vitals:    03/07/22 1438   Resp: 20   Temp: 96.8 °F (36 °C)   Weight: 98.9 kg (218 lb)   Height: 177.8 cm (70\")   PainSc:   2   PainLoc: Foot     Body mass index is 31.28 kg/m².    Subjective    He denies any new issues today    ECOG  (0) Fully active, able to carry on all predisease performance without restriction    Physical Exam:  Physical Exam  Vitals and nursing note reviewed.   Constitutional:       General: He is not in acute distress.     Appearance: He is not diaphoretic.   HENT:      Head: Normocephalic and atraumatic.   Eyes:      General: No scleral icterus.        Right eye: No discharge.         Left eye: No discharge.      Conjunctiva/sclera: Conjunctivae normal.   Neck:      Thyroid: No thyromegaly.   Cardiovascular:      Rate and Rhythm: Normal rate and regular rhythm.      Heart sounds: Normal heart sounds. No friction rub. No gallop.    Pulmonary:      Effort: Pulmonary effort is normal. No respiratory distress.      Breath sounds: No stridor. No wheezing.   Abdominal:      General: Bowel sounds are normal.      Palpations: Abdomen is soft. There is no mass.      Tenderness: There is no abdominal tenderness. There is no guarding or rebound.   Musculoskeletal:         General: No tenderness. Normal range of motion.      Cervical back: Normal range of motion and neck supple.   Lymphadenopathy:      Cervical: No cervical adenopathy.   Skin:     General: Skin is warm.      Findings: No erythema or rash.   Neurological:      Mental Status: He is alert and oriented to person, place, and time.      Motor: No abnormal muscle tone.   Psychiatric:         Behavior: Behavior normal.       I have reexamined the patient and the results are consistent with the previously documented exam. Krysten Colon, " MD       RECENT LABS    WBC   Date Value Ref Range Status   03/07/2022 6.98 3.40 - 10.80 10*3/mm3 Final   08/31/2020 4.7 3.4 - 10.8 x10E3/uL Final     RBC   Date Value Ref Range Status   03/07/2022 4.08 (L) 4.14 - 5.80 10*6/mm3 Final   08/31/2020 4.75 4.14 - 5.80 x10E6/uL Final     Hemoglobin   Date Value Ref Range Status   03/07/2022 12.7 (L) 13.0 - 17.7 g/dL Final     Hematocrit   Date Value Ref Range Status   03/07/2022 38.1 37.5 - 51.0 % Final     MCV   Date Value Ref Range Status   03/07/2022 93.4 79.0 - 97.0 fL Final     MCH   Date Value Ref Range Status   03/07/2022 31.1 26.6 - 33.0 pg Final     MCHC   Date Value Ref Range Status   03/07/2022 33.3 31.5 - 35.7 g/dL Final     RDW   Date Value Ref Range Status   03/07/2022 14.6 12.3 - 15.4 % Final     RDW-SD   Date Value Ref Range Status   03/07/2022 47.9 37.0 - 54.0 fl Final     MPV   Date Value Ref Range Status   03/07/2022 10.4 6.0 - 12.0 fL Final     Platelets   Date Value Ref Range Status   03/07/2022 148 140 - 450 10*3/mm3 Final     Platelets (Citrated Blood)   Date Value Ref Range Status   01/24/2022 117 (L) 140 - 450 10*3/mm3 Final     Neutrophil %   Date Value Ref Range Status   03/07/2022 64.7 42.7 - 76.0 % Final     Lymphocyte %   Date Value Ref Range Status   03/07/2022 22.1 19.6 - 45.3 % Final     Monocyte %   Date Value Ref Range Status   03/07/2022 8.7 5.0 - 12.0 % Final     Eosinophil %   Date Value Ref Range Status   03/07/2022 3.2 0.3 - 6.2 % Final     Basophil %   Date Value Ref Range Status   03/07/2022 1.3 0.0 - 1.5 % Final     Neutrophils, Absolute   Date Value Ref Range Status   03/07/2022 4.52 1.70 - 7.00 10*3/mm3 Final     Lymphocytes, Absolute   Date Value Ref Range Status   03/07/2022 1.54 0.70 - 3.10 10*3/mm3 Final     Monocytes, Absolute   Date Value Ref Range Status   03/07/2022 0.61 0.10 - 0.90 10*3/mm3 Final     Eosinophils, Absolute   Date Value Ref Range Status   03/07/2022 0.22 0.00 - 0.40 10*3/mm3 Final     Basophils, Absolute    Date Value Ref Range Status   03/07/2022 0.09 0.00 - 0.20 10*3/mm3 Final     nRBC   Date Value Ref Range Status   01/18/2022 0.1 0.0 - 0.2 /100 WBC Final       Lab Results   Component Value Date    GLUCOSE 149 (H) 01/18/2022    BUN 16 01/18/2022    CREATININE 1.05 01/18/2022    EGFRIFNONA 69 01/18/2022    EGFRIFAFRI 76 03/10/2021    BCR 15.2 01/18/2022    K 4.4 01/18/2022    CO2 23.0 01/18/2022    CALCIUM 9.1 01/18/2022    PROTENTOTREF 7.1 01/24/2022    ALBUMIN 4.2 01/24/2022    LABIL2 1.4 01/24/2022    AST 40 01/18/2022    ALT 42 (H) 01/18/2022         Assessment/Plan   Thrombocytopenia (HCC)  - CBC & Differential  - Factor 5 Leiden  - Protein S Functional  - Factor II, DNA Analysis  - Antithrombin III  - Anticardiolipin Antibody, IgG / M, Qn  - Beta-2 Glycoprotein Antibodies  - Protein C Activity  - Antiphospholipid Syndrome  - Factor 5 Leiden      1. Chronic thrombocytopenia with platelets ranging between 116 230,000.  His CBCs have not shown any evidence of leukoerythroblastic changes.  Possibilities include chronic ITP.  There is also a history of platelet clumps in the past.  I have requested for his records from [facility].  His recent work-up does not show any significant abnormalities.  For now continue to observe.  His platelets have also normalized today to 1 48,000  2. Recent diagnosis of pulmonary embolism, currently on Eliquis.  This was unprovoked.  Patient has an active lifestyle.  Patient states he is up-to-date on age-appropriate cancer screening including colonoscopy and prostate exam.  We will complete thrombophilia work-up today      Plans    · I have requested for his records from [facility]  · Reviewed his CBC  · Reviewed the work-up for thrombocytopenia which was unremarkable  · Continue Eliquis for PE  · Patient is scheduled for repeat CT chest by his cardiologist in May 2022  · Thrombophilia work-up today  · Discussed with patient  · Follow-up in 4 months  · We will send his labs to his new PCP  Dr. Dorota Acevedo    Patient verbalized understanding and is in agreement of the above plan.            I spent 30 total minutes, face-to-face, caring for Shai today.  90% of this time involved counseling and/or coordination of care as documented within this note, reviewing his notes, formulating management recommendations for him.

## 2022-03-07 ENCOUNTER — LAB (OUTPATIENT)
Dept: LAB | Facility: HOSPITAL | Age: 77
End: 2022-03-07

## 2022-03-07 ENCOUNTER — OFFICE VISIT (OUTPATIENT)
Dept: ONCOLOGY | Facility: CLINIC | Age: 77
End: 2022-03-07

## 2022-03-07 VITALS — TEMPERATURE: 96.8 F | RESPIRATION RATE: 20 BRPM | HEIGHT: 70 IN | WEIGHT: 218 LBS | BODY MASS INDEX: 31.21 KG/M2

## 2022-03-07 DIAGNOSIS — D69.6 THROMBOCYTOPENIA: Primary | ICD-10-CM

## 2022-03-07 LAB
BASOPHILS # BLD AUTO: 0.09 10*3/MM3 (ref 0–0.2)
BASOPHILS NFR BLD AUTO: 1.3 % (ref 0–1.5)
DEPRECATED RDW RBC AUTO: 47.9 FL (ref 37–54)
EOSINOPHIL # BLD AUTO: 0.22 10*3/MM3 (ref 0–0.4)
EOSINOPHIL NFR BLD AUTO: 3.2 % (ref 0.3–6.2)
ERYTHROCYTE [DISTWIDTH] IN BLOOD BY AUTOMATED COUNT: 14.6 % (ref 12.3–15.4)
HCT VFR BLD AUTO: 38.1 % (ref 37.5–51)
HGB BLD-MCNC: 12.7 G/DL (ref 13–17.7)
HOLD SPECIMEN: NORMAL
HOLD SPECIMEN: NORMAL
LYMPHOCYTES # BLD AUTO: 1.54 10*3/MM3 (ref 0.7–3.1)
LYMPHOCYTES NFR BLD AUTO: 22.1 % (ref 19.6–45.3)
MCH RBC QN AUTO: 31.1 PG (ref 26.6–33)
MCHC RBC AUTO-ENTMCNC: 33.3 G/DL (ref 31.5–35.7)
MCV RBC AUTO: 93.4 FL (ref 79–97)
MONOCYTES # BLD AUTO: 0.61 10*3/MM3 (ref 0.1–0.9)
MONOCYTES NFR BLD AUTO: 8.7 % (ref 5–12)
NEUTROPHILS NFR BLD AUTO: 4.52 10*3/MM3 (ref 1.7–7)
NEUTROPHILS NFR BLD AUTO: 64.7 % (ref 42.7–76)
PLATELET # BLD AUTO: 148 10*3/MM3 (ref 140–450)
PMV BLD AUTO: 10.4 FL (ref 6–12)
RBC # BLD AUTO: 4.08 10*6/MM3 (ref 4.14–5.8)
WBC NRBC COR # BLD: 6.98 10*3/MM3 (ref 3.4–10.8)
WHOLE BLOOD HOLD SPECIMEN: NORMAL

## 2022-03-07 PROCEDURE — 85300 ANTITHROMBIN III ACTIVITY: CPT

## 2022-03-07 PROCEDURE — 85025 COMPLETE CBC W/AUTO DIFF WBC: CPT | Performed by: INTERNAL MEDICINE

## 2022-03-07 PROCEDURE — 99214 OFFICE O/P EST MOD 30 MIN: CPT | Performed by: INTERNAL MEDICINE

## 2022-03-07 PROCEDURE — 85303 CLOT INHIBIT PROT C ACTIVITY: CPT

## 2022-03-07 PROCEDURE — 36415 COLL VENOUS BLD VENIPUNCTURE: CPT | Performed by: INTERNAL MEDICINE

## 2022-03-07 PROCEDURE — 81240 F2 GENE: CPT

## 2022-03-07 PROCEDURE — 81241 F5 GENE: CPT | Performed by: INTERNAL MEDICINE

## 2022-03-07 RX ORDER — DIAZEPAM 5 MG/1
TABLET ORAL
COMMUNITY
Start: 2022-03-02

## 2022-03-07 RX ORDER — CLINDAMYCIN HYDROCHLORIDE 300 MG/1
CAPSULE ORAL
COMMUNITY
Start: 2022-03-02 | End: 2022-04-25

## 2022-03-08 LAB
CARDIOLIPIN IGG SER IA-ACNC: <9 GPL U/ML (ref 0–14)
CARDIOLIPIN IGM SER IA-ACNC: 10 MPL U/ML (ref 0–12)

## 2022-03-09 LAB
AT III PPP CHRO-ACNC: >128 % (ref 75–120)
B2 GLYCOPROT1 IGA SER-ACNC: <9 GPI IGA UNITS (ref 0–25)
B2 GLYCOPROT1 IGG SER-ACNC: <9 GPI IGG UNITS (ref 0–20)
B2 GLYCOPROT1 IGM SER-ACNC: <9 GPI IGM UNITS (ref 0–32)
F5 GENE MUT ANL BLD/T: NORMAL
FACTOR II, DNA ANALYSIS: NORMAL
PROT C ACT/NOR PPP: 104 % (ref 70–130)
PROT S ACT/NOR PPP: 118 % (ref 63–140)

## 2022-03-10 LAB
APTT HEX PL PPP: 2 SEC (ref 0–11)
APTT PPP: 29.2 SEC (ref 22.9–30.2)
B2 GLYCOPROT1 IGG SER-ACNC: <9 GPI IGG UNITS (ref 0–20)
B2 GLYCOPROT1 IGM SER-ACNC: <9 GPI IGM UNITS (ref 0–32)
CARDIOLIPIN IGG SER IA-ACNC: <9 GPL U/ML (ref 0–14)
CARDIOLIPIN IGM SER IA-ACNC: 14 MPL U/ML (ref 0–12)
DRVVT SCREEN TO CONFIRM RATIO: 1 RATIO (ref 0.8–1.2)
INR PPP: 1.1 (ref 0.9–1.2)
PATH INTERP BLD-IMP: NORMAL
PATH INTERP SPEC-IMP: ABNORMAL
PROTHROMBIN TIME: 11.6 SEC (ref 9.1–12)
SCREEN DRVVT: 70.3 SEC (ref 0–47)
THROMBIN TIME: 18.2 SEC (ref 0–23)

## 2022-03-17 ENCOUNTER — APPOINTMENT (OUTPATIENT)
Dept: VASCULAR SURGERY | Facility: HOSPITAL | Age: 77
End: 2022-03-17

## 2022-03-17 PROCEDURE — G0463 HOSPITAL OUTPT CLINIC VISIT: HCPCS

## 2022-03-18 ENCOUNTER — TRANSCRIBE ORDERS (OUTPATIENT)
Dept: ADMINISTRATIVE | Facility: HOSPITAL | Age: 77
End: 2022-03-18

## 2022-03-18 DIAGNOSIS — I82.813 EMBOLISM AND THROMBOSIS OF SUPERFICIAL VEIN OF BOTH LOWER EXTREMITIES: Primary | ICD-10-CM

## 2022-04-07 ENCOUNTER — APPOINTMENT (OUTPATIENT)
Dept: VASCULAR SURGERY | Facility: HOSPITAL | Age: 77
End: 2022-04-07

## 2022-04-07 ENCOUNTER — APPOINTMENT (OUTPATIENT)
Dept: CARDIOLOGY | Facility: HOSPITAL | Age: 77
End: 2022-04-07

## 2022-04-25 ENCOUNTER — OFFICE VISIT (OUTPATIENT)
Dept: PODIATRY | Facility: CLINIC | Age: 77
End: 2022-04-25

## 2022-04-25 VITALS
HEART RATE: 79 BPM | WEIGHT: 218 LBS | DIASTOLIC BLOOD PRESSURE: 70 MMHG | BODY MASS INDEX: 31.21 KG/M2 | HEIGHT: 70 IN | SYSTOLIC BLOOD PRESSURE: 114 MMHG

## 2022-04-25 DIAGNOSIS — G62.89 OTHER POLYNEUROPATHY: ICD-10-CM

## 2022-04-25 DIAGNOSIS — R20.0 NUMBNESS AND TINGLING OF BOTH FEET: ICD-10-CM

## 2022-04-25 DIAGNOSIS — M79.672 LEFT FOOT PAIN: Primary | ICD-10-CM

## 2022-04-25 DIAGNOSIS — M21.6X2 EQUINUS DEFORMITY OF LEFT FOOT: ICD-10-CM

## 2022-04-25 DIAGNOSIS — R20.2 NUMBNESS AND TINGLING OF BOTH FEET: ICD-10-CM

## 2022-04-25 PROCEDURE — 99213 OFFICE O/P EST LOW 20 MIN: CPT | Performed by: PODIATRIST

## 2022-04-26 NOTE — PROGRESS NOTES
"04/25/2022  Foot and Ankle Surgery - Established Patient/Follow-up  Provider: Dr. Brett Decker DPM  Location: [facility]    Subjective:  Shai Chery is a 76 y.o. male.     Chief Complaint   Patient presents with   • Left Foot - Pain   • Follow-up     Last pcp appt with demarco Acevedo md 4/18/2022       HPI: Patient returns for follow-up regarding his left foot.  He continues to have issues and pain and discomfort.  He does complain of numbness involving the forefoot and lateral column discomfort.  He continues to remain active and states that the symptoms are worse after activity.  He has been wearing the inserts on a daily basis.    No Known Allergies    Current Outpatient Medications on File Prior to Visit   Medication Sig Dispense Refill   • apixaban (Eliquis) 5 MG tablet tablet Take 5 mg bid 180 tablet 1   • atorvastatin (LIPITOR) 40 MG tablet Take 1 tablet by mouth Daily.     • baclofen (LIORESAL) 10 MG tablet TAKE 1 TABLET TWICE DAILY AS NEEDED 60 tablet 2   • diazePAM (VALIUM) 5 MG tablet TAKE 1 TABLET BY MOUTH THREE TIMES DAILY FOR 1 DAY AS NEEDED FOR ANXIETY TAKE AROUND MRI PROCEDURE AS DIRECTED     • fenofibrate (Tricor) 145 MG tablet Take 1 tablet by mouth Daily. 90 tablet 3   • gabapentin (NEURONTIN) 600 MG tablet      • glucosamine-chondroitin 500-400 MG capsule capsule Take  by mouth 3 (Three) Times a Day With Meals.     • levothyroxine (SYNTHROID, LEVOTHROID) 200 MCG tablet Take 1 tablet by mouth Daily. 90 tablet 3   • metoprolol tartrate (LOPRESSOR) 50 MG tablet Take 1 tablet by mouth 2 (Two) Times a Day. 180 tablet 0   • valsartan (DIOVAN) 160 MG tablet        No current facility-administered medications on file prior to visit.       Objective   /70   Pulse 79   Ht 177.8 cm (70\")   Wt 98.9 kg (218 lb)   BMI 31.28 kg/m²     Podiatry Exam       General Appearance:  well nourished; well hydrated; no acute distress  Mental Status Exam        Judgement and Insight: "  Intact       Orientation:  Oriented to time, place, and person       Memory:  Intact for recent and remote events       Mood and Affect:  No depression, anxiety, or agitation  Cardiovascular (Bilateral)       Dorsalis Pedis Pulse (BL):  2/4       Posterior Tibialis Pulse (BL):  2/4       Capillary Filling Time (BL):  1-3 Seconds       Edema (BL):  No Edema  Dermatological Exam       Temperature:  warm to warm       Skin Elasticity:  decreased elasticity       Pigmentation:  normal pigmentation       Skin:  normal pigmentation  Skin:  Hyperkeratotic lesions are much improved.  No jelani open wound, fissure, or further concern at this time.  Neurological Exam (Bilateral)       Paresthesia (Bl):  Paresthesia; numbness, tingling, and burning       Achilles DTRs (Bl):  diminished       Tinel over Tarsal Tunnel (Bl):  negative       Intermedial Dorsal Cutaneous Nerve (Bl):  negative       Medial Dorsal Cutaneous Nerve (Bl):  negative  Monofilament Test (Bl):   not intact  Additional Neurological Findings  Sensation is diminished with a monofilament testing proximal to the ankles, bilateral. Motor function and coordination are grossly intact.  Hypertrophic Nail Description (Left)       Thickened:  1, 2, 3, 4, 5       >3mm:  1, 2, 3, 4, 5       Ridged:  1, 2, 3, 4, 5       Incurvated:  1       Onychomycosis:  1, 2, 3, 4, 5  Hypertrophic Nail Description (Right)       >3mm:  1, 2, 3, 4, 5       Ridged : 1, 2, 3, 4, 5       Incurvated:  1       Onychomycosis:  1, 2, 3, 4, 5        MusculoSkeletal Exam (Bilateral)       Gait and Stance (Bl):  Stable, unassisted, and nonantalgic       ROM (Bl): Limited secondary to guarding       Muscle Strength (Bl):  symmetrical 5/5       Dislocated Joints (Bl):  no dislocation of joints       Inflammed Joints (Bl):  no inflammation of joints       Hammertoe Deformity (Bl):  2nd, 3rd, 4th, 5th       Medial Turbicle Calc (Bl):  no pain       Gastroc soleus equinus (Bl):  Inability to dorsiflex  past neutral position both straight legged and bent knee       Post tibial tendon (Bl):  no soreness noted       Peroneal Tendon (Bl):  no soreness noted  Additional Musculoskelatal Findings  Increased pain involving the midfoot with erythema and swelling.  Mild calor.  No progressive deformity or instability.    Assessment/Plan   Diagnoses and all orders for this visit:    1. Left foot pain (Primary)    2. Numbness and tingling of both feet  -     EMG & Nerve Conduction Test; Future    3. Other polyneuropathy    4. Equinus deformity of left foot      Patient's physical exam is unchanged and stable.  He continues to complain of numbness and tingling sensations involving the forefoot as well as lateral column pain.  His issues have been noticed after activities.  He has been performing low impact exercises.  He is worried that symptoms will persist or progress.  I have recommended that we proceed with an EMG for further evaluation of the lower extremities.  I would like him to continue low impact exercise and stretching.  He is to remain in the inserts as directed.  We did review appropriate shoes.  I will see him in 4 weeks for reevaluation.  Greater than 20 minutes was spent before, during, and after evaluation for patient care.    Orders Placed This Encounter   Procedures   • EMG & Nerve Conduction Test     Standing Status:   Future     Standing Expiration Date:   4/25/2023     Order Specific Question:   Extremity     Answer:   BETY LOWER EXT     Order Specific Question:   Please specify number of extremities:     Answer:   2 Extremities     Order Specific Question:   Reason for Exam:     Answer:   BETY FEET NUMBNESS     Order Specific Question:   Release to patient     Answer:   Immediate          Note is dictated utilizing voice recognition software. Unfortunately this leads to occasional typographical errors. I apologize in advance if the situation occurs. If questions occur please do not hesitate to call our  office.

## 2022-06-23 ENCOUNTER — OFFICE VISIT (OUTPATIENT)
Dept: ORTHOPEDIC SURGERY | Facility: CLINIC | Age: 77
End: 2022-06-23

## 2022-06-23 VITALS
BODY MASS INDEX: 30.78 KG/M2 | WEIGHT: 215 LBS | OXYGEN SATURATION: 98 % | HEIGHT: 70 IN | HEART RATE: 58 BPM | DIASTOLIC BLOOD PRESSURE: 60 MMHG | SYSTOLIC BLOOD PRESSURE: 99 MMHG

## 2022-06-23 DIAGNOSIS — M25.561 CHRONIC PAIN OF BOTH KNEES: Primary | ICD-10-CM

## 2022-06-23 DIAGNOSIS — M25.562 CHRONIC PAIN OF BOTH KNEES: Primary | ICD-10-CM

## 2022-06-23 DIAGNOSIS — G89.29 CHRONIC PAIN OF BOTH KNEES: Primary | ICD-10-CM

## 2022-06-23 DIAGNOSIS — M17.12 PRIMARY LOCALIZED OSTEOARTHROSIS OF THE KNEE, LEFT: ICD-10-CM

## 2022-06-23 PROCEDURE — 99213 OFFICE O/P EST LOW 20 MIN: CPT | Performed by: ORTHOPAEDIC SURGERY

## 2022-06-23 NOTE — PROGRESS NOTES
"     Patient ID: Shai Chery is a 76 y.o. male.  Left knee pain  Shai returns with left knee pain present for most of the year he has history of arthroscopy and meniscectomy many years ago.  Pain is mainly after doing a lot of walking it swells at times and grinds    Review of Systems:  Left knee pain      Objective:    BP 99/60 (BP Location: Left arm, Patient Position: Sitting, Cuff Size: Adult)   Pulse 58   Ht 177.8 cm (70\")   Wt 97.5 kg (215 lb)   SpO2 98%   BMI 30.85 kg/m²     Physical Examination:     Left knee has healed arthroscopy portals.  Mild effusion.  Mild medial lateral joint line tenderness.  He range of motion 0 to 115 degrees with patellofemoral crepitance pain on medial Melita but no instability  Sensory and motor exam are intact in all distributions. Dorsalis pedis and posterior tibialis pulses are palpable and capillary refill is less than two seconds to all digits.    Imaging:   bilateral Knee X-Ray  Indication: Bilateral knee pain history of total knee arthroplasty right side  AP, Lateral views, [place]  Findings: Moderate patellofemoral arthritis left knee total knee arthroplasty and overall okay position right knee  no bony lesion  Soft tissues normal  decreased joint spaces  Hardware appropriately positioned yes      no prior studies available for comparison.    Assessment:    Left knee degenerative joint disease    Plan:   Options discussed, recommend viscosupplementation      Procedures          Disclaimer: Part of this note may be an electronic transcription/translation of spoken language to printed text using the Dragon Dictation System  "

## 2022-07-18 ENCOUNTER — TELEPHONE (OUTPATIENT)
Dept: ORTHOPEDIC SURGERY | Facility: CLINIC | Age: 77
End: 2022-07-18

## 2022-07-18 NOTE — TELEPHONE ENCOUNTER
PATIENT CALLED AND STATED HE TALKED TO [facility] REGARDING HIS VISCO INJECTIONS. HE STATES THAT [facility] NEEDS THE REFERRAL TO STATE THAT IT IS MEDICALLY NECCESSARY FOR THE INJECTION. HE NEEDS IT RE-SENT FOR AUTH. PLEASE ADVISE.

## 2022-07-18 NOTE — TELEPHONE ENCOUNTER
Caller: ANDERSON  Relationship to Patient: SELF    Phone Number: [phone]  Reason for Call:PT CALLED STATING THAT HE WAS SEEN 06/23/22 AND DR ESPINOZA ADVISED DOING VISCO INJECTIONS. PT STATES THAT HE HAS NOT HEARD ANYTHING AND WANTED TO CHECK ON STATUS. STATES THAT HE WILL CALL HIS INSURANCE AS WELL TO TRY TO GET APPROVAL QUICKLY. PLEASE ADVISE

## 2022-09-06 ENCOUNTER — TELEPHONE (OUTPATIENT)
Dept: ORTHOPEDIC SURGERY | Facility: CLINIC | Age: 77
End: 2022-09-06

## 2022-09-06 NOTE — TELEPHONE ENCOUNTER
Patient insurance has denied both monovisc and zilretta, would you like me to send off for Zilretta?

## 2023-02-22 ENCOUNTER — OFFICE VISIT (OUTPATIENT)
Dept: CARDIAC SURGERY | Facility: CLINIC | Age: 78
End: 2023-02-22
Payer: MEDICARE

## 2023-02-22 VITALS
TEMPERATURE: 97.8 F | RESPIRATION RATE: 20 BRPM | HEART RATE: 67 BPM | WEIGHT: 216 LBS | HEIGHT: 70 IN | DIASTOLIC BLOOD PRESSURE: 77 MMHG | OXYGEN SATURATION: 91 % | SYSTOLIC BLOOD PRESSURE: 128 MMHG | BODY MASS INDEX: 30.92 KG/M2

## 2023-02-22 DIAGNOSIS — I71.21 ANEURYSM OF ASCENDING AORTA WITHOUT RUPTURE: Primary | ICD-10-CM

## 2023-02-22 DIAGNOSIS — E78.2 MIXED HYPERLIPIDEMIA: Chronic | ICD-10-CM

## 2023-02-22 DIAGNOSIS — I10 ESSENTIAL HYPERTENSION: ICD-10-CM

## 2023-02-22 PROCEDURE — 99213 OFFICE O/P EST LOW 20 MIN: CPT | Performed by: NURSE PRACTITIONER

## 2023-02-22 NOTE — PROGRESS NOTES
2/22/2023      Subjective:      Dorota Acevedo MD    Chief Complaint: ascending aortic ectasia    History of Present Illness:       Dear Dorota Cavazos MD and Colleagues,    It was nice to see Shai Chery in [facility] today. He is a 77 y.o. male with a history of tobacco abuse and underwent a lung screening CT scan he was noted to have a stable ascending aortic ectasia.  He has CT scans available in Epic back as far as 2017.  Additional PMHx includes:  HTN, HLD, thyroid cancer with thyroidectomy and subsequent hypothyroidism, clinical features of CL, some question of pulmonary emboli that was initially treated with anticoagulation.  He has seen Dr. Colon in the past for TCP and unprovoked PEs.  He is no longer taking Eliquis.  He does see Dr. Lisandro Ernst for cardiology care.  He reports he went to [facility] ED for an ekg after experiencing chest pain.  He feels it is r/t anxiety now.  He comes in today for evaluation and management of thoracic aortic ectasia.  The CT of chest without contrast on 1/13/2023 from [facility] was reviewed.  The aortic root measures 3.6-3.7 cm, ascending aorta measures 4.1-4.4 cm, and DTA measures 3-3.1 cm.  These measurements are stable since 2017.  Transthoracic echo in 2019 showed normal EF and no AI.  He denies shortness of breath, chest/back pain, numbness/tingling/pain of extremities.  No vascular deficiencies or hyperextensible or hypermobile joints are noted on exam.  The patient's family history is negative for aneurysms or dissections, negative for connective tissue disease, and positive for coronary disease in first degree family members--his father had CAD.  He has quit tobacco.  He is a retired neuroanatomist.  His BP today is 128/77.  He exercises at the gym and by walking his dog.        Patient Active Problem List   Diagnosis   • Hx of total knee replacement, right   • Primary osteoarthritis of left knee   • Occipital neuralgia    • Arthritis   • Benign prostatic hyperplasia   • Derangement of medial meniscus of right knee   • Oral phase dysphagia   • Dystrophia unguium   • Mixed hyperlipidemia   • Hypothyroidism   • Meningioma ([facility])   • Neuropathy   • Class 1 obesity due to excess calories with serious comorbidity and body mass index (BMI) of 30.0 to 30.9 in adult   • Primary localized osteoarthritis   • Rotator cuff tear   • Presence of right artificial knee joint   • Alcoholic polyneuropathy (HCC)   • Brain mass   • Diverticulosis   • Fracture of fibula   • Gastro-esophageal reflux   • Hypoglycemia   • Impotence of organic origin   • Insomnia   • Thrombocytopenia ([facility])   • Tubular adenoma of colon   • Sinus tachycardia   • Essential hypertension   • Dyspnea   • Shortness of breath   • History of tobacco use   • Skin tag   • Swelling of left foot   • Callus of foot   • Palpitations   • Medicare annual wellness visit, subsequent   • Atypical chest pain   • Oral thrush   • Abdominal pain   • Obesity (BMI 30.0-34.9)   • Thoracic aortic aneurysm without rupture   • Multiple subsegmental pulmonary emboli without acute cor pulmonale ([facility])       Past Medical History:   Diagnosis Date   • Abnormal laboratory test 03/26/2018   • Alcoholic polyneuropathy ([facility])    • Allergic rhinitis 05/01/2013   • Anxiety 05/23/2016   • Arthritis 12/06/2016   • Benign prostatic hyperplasia    • Bitten by dog, initial encounter 10/16/2018   • Body mass index 30.0-30.9, adult 12/22/2017   • Body mass index 31.0-31.9, adult 01/16/2018   • Brain mass    • Callus of foot 12/22/2016    Calluses, feet, bilateral   • Cancer ([facility])     brain   • Cellulitis and abscess of leg 12/12/2017   • COPD (chronic obstructive pulmonary disease) ([facility])    • Cough 12/06/2016   • Degenerative joint disease of knee, right 03/26/2018    primary localized degenerative joint disease of right knee.    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 However diet modification and compliance with medication is discussed with the patient. Patient is advised to have periodic AST, ALT, and lipid panel testing through your office.    • Hypertension 05/01/2013    Benign. 12-14-18- patient's blood pressure is within desireable ranges. At this time, I would not recommend any change in antihypertensive regimen. However, patient is advised to check the blood pressure periodically at home and bring the logbook on next visit. Patient is also encouraged to increase aerobic activities as tolerated. Risk factor modification is discussed.    • Hypothyroidism    • Impotence of organic origin    • Insomnia    • Internal derangement of knee 05/05/2014 6-9-14- He saw Dr. Sams and Dr. Concepcion. He had fluid drained from his knee with negative cultures and negative crystals. Both were not concerned about the MRI (report not avilable), but he has a radiologist friend who read it as having meniscal damage. He is offered referral back to the orthopedist of his choice and will thnk about who he wants to see. He has upcoming shoulder surgery.    • Knee pain, right 05/04/2017   • Knee pain, right 04/03/2017   • Low back pain    • Medication monitoring encounter 04/19/2019   • Meningioma (HCC) 06/09/2014   • Nasal polyp 10/06/2017   • Neoplasm of uncertain behavior  10/11/2018   • Neoplasm of uncertain behavior of skin 05/04/2015   • Neuropathy 12/18/2014   • Obesity    • Orthopedic aftercare 06/12/2017   • Other fatigue 12/4/2019   • Overweight 12/12/2017   • Pain in joint of right ankle 05/31/2017   • Pain management     [facility]    • Palpitations 12/22/2017    3-16-18- his symptom of palpitation have improved. Continue beta-blockers.    • Pre-op exam 12/18/2018   • Prostatic hypertrophy 06/09/2014    Benign   • Puncture wound without foreign body of left hand, initial encounter 10/16/2018   • Rash 06/28/2017   • Rotator cuff tear 05/19/2014    left   • Shortness of breath 02/02/2018   • Sinus tachycardia 03/16/2018 12-14-18- much better. Continue current treat.    • Tobacco use        Past Surgical History:   Procedure Laterality Date   • ANKLE HARDWARE REMOVAL Right    • APPENDECTOMY  1977   • COLONOSCOPY      every 3 years with [facility]   • EYE SURGERY Bilateral     cataracts   • JOINT REPLACEMENT Right 2019    knee   • KNEE ARTHROSCOPY  06/09/2017    and 12/5/2014. Dr Gallego. With partial medial and lateral miniscectomy.    • KNEE ARTHROSCOPY Right 06/09/2017   • KNEE ARTHROSCOPY W/ MENISCECTOMY Right 09/18/2018    right knee scope/meniscectomy   • LUMBAR DISC SURGERY  11/11/2015    Right L3-4 diskectomy. Dr. Pollard.    • ROTATOR CUFF REPAIR  07/2014    Dr Gallego.   • SHOULDER SURGERY Left    • THYROIDECTOMY  1971   • TONSILLECTOMY  1966   • TOTAL KNEE ARTHROPLASTY Right 01/16/2019    Right TKR   • TOTAL KNEE ARTHROPLASTY Right 01/16/2019    Dr. Crain       No Known Allergies      Current Outpatient Medications:   •  atorvastatin (LIPITOR) 40 MG tablet, Take 1 tablet by mouth Daily., Disp: , Rfl:   •  baclofen (LIORESAL) 10 MG tablet, TAKE 1 TABLET TWICE DAILY AS NEEDED, Disp: 60 tablet, Rfl: 2  •  fenofibrate (Tricor) 145 MG tablet, Take 1 tablet by mouth Daily., Disp: 90 tablet, Rfl: 3  •  gabapentin (NEURONTIN) 600 MG tablet, , Disp: , Rfl:   •   glucosamine-chondroitin 500-400 MG capsule capsule, Take  by mouth 3 (Three) Times a Day With Meals., Disp: , Rfl:   •  levothyroxine (SYNTHROID, LEVOTHROID) 200 MCG tablet, Take 1 tablet by mouth Daily., Disp: 90 tablet, Rfl: 3  •  metoprolol tartrate (LOPRESSOR) 50 MG tablet, Take 1 tablet by mouth 2 (Two) Times a Day., Disp: 180 tablet, Rfl: 0  •  valsartan (DIOVAN) 160 MG tablet, , Disp: , Rfl:   •  diazePAM (VALIUM) 5 MG tablet, TAKE 1 TABLET BY MOUTH THREE TIMES DAILY FOR 1 DAY AS NEEDED FOR ANXIETY TAKE AROUND MRI PROCEDURE AS DIRECTED, Disp: , Rfl:     Social History     Socioeconomic History   • Marital status:    Tobacco Use   • Smoking status: Former     Packs/day: 1.00     Years: 40.00     Pack years: 40.00     Types: Cigarettes     Start date: 8/1/1980     Quit date: 1/1/2020     Years since quitting: 3.1   • Smokeless tobacco: Never   • Tobacco comments:     quit 1/2020   Vaping Use   • Vaping Use: Never used   Substance and Sexual Activity   • Alcohol use: Yes     Types: 2 Glasses of wine per week   • Drug use: No   • Sexual activity: Not Currently     Partners: Female       Family History   Problem Relation Age of Onset   • Diabetes Mother    • Stroke Mother    • Other Mother         Thyroid Disease   • Heart disease Mother         Ischemic   • Cancer Mother    • COPD Mother    • Hypertension Father    • Heart disease Father         Ischemic   • Hyperlipidemia Father    • Cancer Father    • Arthritis Paternal Grandmother    • Cancer Sister    • Diabetes Maternal Grandmother    • Hearing loss Sister            Review of Systems:  Review of Systems   Constitutional: Positive for fatigue.   Respiratory: Negative for cough and shortness of breath.    Cardiovascular: Positive for chest pain. Negative for leg swelling.   Musculoskeletal: Positive for arthralgias.   Neurological: Negative for dizziness and light-headedness.       Physical Exam:    Vital Signs:  Weight: 98 kg (216 lb)   Body mass  index is 30.99 kg/m².  Temp: 97.8 °F (36.6 °C)   Heart Rate: 67   BP: 128/77     Physical Exam  Vitals and nursing note reviewed.   Constitutional:       General: He is awake.      Appearance: Normal appearance. He is well-developed and well-groomed.   HENT:      Head: Normocephalic and atraumatic.      Nose: Nose normal.      Mouth/Throat:      Lips: Pink.      Mouth: Mucous membranes are moist.      Pharynx: Uvula midline.   Eyes:      General: Lids are normal. No scleral icterus.     Extraocular Movements: Extraocular movements intact.      Conjunctiva/sclera: Conjunctivae normal.      Pupils: Pupils are equal, round, and reactive to light.   Neck:      Thyroid: No thyroid mass or thyromegaly.      Vascular: Normal carotid pulses. No carotid bruit, hepatojugular reflux or JVD.      Trachea: Trachea normal.   Cardiovascular:      Rate and Rhythm: Normal rate and regular rhythm.      Pulses:           Carotid pulses are 2+ on the right side and 2+ on the left side.       Radial pulses are 2+ on the right side and 2+ on the left side.        Femoral pulses are 2+ on the right side and 2+ on the left side.       Popliteal pulses are 2+ on the right side and 2+ on the left side.        Dorsalis pedis pulses are 2+ on the right side and 2+ on the left side.        Posterior tibial pulses are 2+ on the right side and 2+ on the left side.      Heart sounds: Normal heart sounds. No murmur heard.  Pulmonary:      Effort: Pulmonary effort is normal.      Breath sounds: Normal breath sounds.   Abdominal:      General: Abdomen is protuberant. Bowel sounds are normal. There is no distension.      Palpations: Abdomen is soft.      Tenderness: There is no abdominal tenderness.   Musculoskeletal:      Cervical back: Neck supple.      Right lower leg: No edema.      Left lower leg: No edema.      Comments: Gait steady and strong without use of assistive devices   Lymphadenopathy:      Cervical: No cervical adenopathy.      Upper  Body:      Right upper body: No supraclavicular adenopathy.      Left upper body: No supraclavicular adenopathy.   Skin:     General: Skin is warm and dry.      Capillary Refill: Capillary refill takes less than 2 seconds.      Findings: No erythema or rash.      Nails: There is no clubbing.   Neurological:      Mental Status: He is alert and oriented to person, place, and time.      GCS: GCS eye subscore is 4. GCS verbal subscore is 5. GCS motor subscore is 6.   Psychiatric:         Attention and Perception: Attention and perception normal.         Mood and Affect: Mood and affect normal.         Speech: Speech normal.         Behavior: Behavior normal. Behavior is cooperative.         Thought Content: Thought content normal.         Cognition and Memory: Cognition and memory normal.         Judgment: Judgment normal.          Assessment:     1.  Ascending aortic ectasia, 4.1-4.4 cm--stable since 2017  2.  HTN--stable  3.  HLD--statin  4.  Former tobacco abuse    Recommendation/Plan:       Continued risk factor modification of hypertension with a goal blood pressure less than 130/80, hyperlipidemia optimization, and continued avoidance of tobacco/nicotine products.    We discussed the importance of avoidance of over the counter decongestants and stimulants, specifically pseudoephedrine, for hypertension and aneurysm management.    Initiation of low aerobic exercise is indicated to help reduce body habitus, assist with blood pressure and cholesterol control.       Although risk of rupture is low, emergency department presentation is warranted for acute chest, back, or abdominal pain, syncope, or stroke like symptoms.    Follow up in [facility] in one year(s) with CT scan of chest without contrast.  We can use his lung screen scan to evaluate his aorta.  We discussed his episode of chest pain and anxiety.  He will continue to follow up with Dr. Lisandro Ernst.    I spent approximately 40 minutes total in evaluating  the data, examining the patient, discussing the options and counseling.  Independent interpretation of imaging.  Imaging shown to pt.     Thank you for allowing us to participate in his care.    Regards,    JUAN Lynch      **All problems and history are new to this examiner.  Extensive review of records prior to and during patient visit

## 2023-02-23 ENCOUNTER — PATIENT ROUNDING (BHMG ONLY) (OUTPATIENT)
Dept: CARDIAC SURGERY | Facility: CLINIC | Age: 78
End: 2023-02-23
Payer: MEDICARE

## 2023-02-23 NOTE — PROGRESS NOTES
A My Chart message has been sent to the patient for PATIENT ROUNDING with [facility]

## 2023-09-21 ENCOUNTER — TELEPHONE (OUTPATIENT)
Dept: CARDIAC SURGERY | Facility: CLINIC | Age: 78
End: 2023-09-21
Payer: MEDICARE

## 2023-09-21 NOTE — TELEPHONE ENCOUNTER
Caller: Shai Chery    Relationship to patient: Self    Best call back number: [phone]    Chief complaint: SCHEDULING YEARLY CTA AND FOLLOW UP    Type of visit: FOLLOW UP    Requested date: FEB 2024     If rescheduling, when is the original appointment: N/A     Additional notes:PT CALLED IN AND STATES HE DOES A YEARLY CTA AND FOLLOW UP AND ITS GETTING CLOSE TO THAT TIME SO HE IS TRYING TO GET THE ORDER IN HIS CHART AND GET THE FOLLOW UP SCHEDULED. PLEASE GIVE HIM A CALL BACK. THANK YOU.

## 2023-09-22 DIAGNOSIS — I71.20 THORACIC AORTIC ANEURYSM WITHOUT RUPTURE, UNSPECIFIED PART: Primary | ICD-10-CM

## 2023-09-22 NOTE — TELEPHONE ENCOUNTER
Called  to let him know ct orders placed. Per Miri he would rather has ct completed at [facility]. Advised I would fax [facility] to [facility].  will call and schedule date for testing with [facility]. Updated patient recall

## 2023-10-02 ENCOUNTER — OFFICE VISIT (OUTPATIENT)
Dept: ORTHOPEDIC SURGERY | Facility: CLINIC | Age: 78
End: 2023-10-02
Payer: MEDICARE

## 2023-10-02 VITALS — HEART RATE: 68 BPM | BODY MASS INDEX: 30.06 KG/M2 | HEIGHT: 70 IN | WEIGHT: 210 LBS

## 2023-10-02 DIAGNOSIS — M25.511 ACUTE PAIN OF RIGHT SHOULDER: Primary | ICD-10-CM

## 2023-10-02 RX ORDER — TAMSULOSIN HYDROCHLORIDE 0.4 MG/1
0.4 CAPSULE ORAL
COMMUNITY
Start: 2023-09-06 | End: 2024-03-04

## 2023-10-02 RX ORDER — ALBUTEROL SULFATE 90 UG/1
AEROSOL, METERED RESPIRATORY (INHALATION)
COMMUNITY
Start: 2023-07-31

## 2023-10-02 RX ORDER — LIDOCAINE HYDROCHLORIDE 10 MG/ML
2 INJECTION, SOLUTION EPIDURAL; INFILTRATION; INTRACAUDAL; PERINEURAL
Status: COMPLETED | OUTPATIENT
Start: 2023-10-02 | End: 2023-10-02

## 2023-10-02 RX ORDER — TRIAMCINOLONE ACETONIDE 40 MG/ML
80 INJECTION, SUSPENSION INTRA-ARTICULAR; INTRAMUSCULAR
Status: COMPLETED | OUTPATIENT
Start: 2023-10-02 | End: 2023-10-02

## 2023-10-02 RX ADMIN — LIDOCAINE HYDROCHLORIDE 2 ML: 10 INJECTION, SOLUTION EPIDURAL; INFILTRATION; INTRACAUDAL; PERINEURAL at 11:02

## 2023-10-02 RX ADMIN — TRIAMCINOLONE ACETONIDE 80 MG: 40 INJECTION, SUSPENSION INTRA-ARTICULAR; INTRAMUSCULAR at 11:02

## 2023-10-02 NOTE — PROGRESS NOTES
"Shai is a 77 y.o. year old male presents to [facility]    Chief Complaint   Patient presents with    Right Shoulder - Initial Evaluation, Pain     Pain 8 DOI 1 wk ago and 2 wks ago        History of Present Illness  Shai Chery is a 77 y.o. male presents to clinic for evaluation of right shoulder pain that has been present 2 weeks when he pull started his garden tiller and 2nd injury where he tripped landing on his outstretched right hand/arm. Recently prescribed Celebrex 200mg/daily by PCP, Curtis, beginning 2 weeks ago. Past treatments for this new issue includes: rest, activity modification, Tylenol, and stretching.    I have reviewed the patient's medical, family, and social history in detail and updated the computerized patient record.    Objective:  Pulse 68   Ht 177.8 cm (70\")   Wt 95.3 kg (210 lb)   BMI 30.13 kg/m²      Physical Exam    Vital signs reviewed.   General: No acute distress.  Eyes: conjunctiva clear  ENT: external ears atraumatic  CV: no peripheral edema  Resp: normal respiratory effort  Skin: no rashes or wounds; normal turgor  Psych: mood and affect appropriate; recent and remote memory intact  Neuro: sensation to light touch intact    MSK Exam    Right shoulder:  Intact skin. No effusion.  Tenderness over the bicipital groove  Pain with overhead reaching  Passive fwd flexion 160, abduction 125,  Active ER 40, internal rotation L2  Positive Speed  No pain or weakness with Hinds and supraspinatus testing  Negative scarf, negative Hurt    Belly press 5/5; lift off 5/5    Imaging:  XR Shoulder 2+ View Right (09/30/2023)   Findings:  There is mild acromioclavicular and glenohumeral osteophyte formation. There is no acute fracture or dislocation. Acromiohumeral and coracoclavicular distances are maintained. The adjacent lung and ribs appear intact.    Impression:  Mild acromioclavicular and glenohumeral joint degeneration.    Assessment:  Diagnoses and all " orders for this visit:    Acute pain of right shoulder    Other orders  -     albuterol sulfate  (90 Base) MCG/ACT inhaler  -     CeleBREX 200 MG capsule; Take 1 capsule by mouth.  -     tamsulosin (FLOMAX) 0.4 MG capsule 24 hr capsule; Take 1 capsule by mouth.      Plan: Recommend steroid injection to the right subacromial space to provide improved function.  Exercises for stretching and strengthening provided at checkout today.  Recommend performing these 3-5 times weekly.  Follow-up if symptoms fail to improve.  All questions answered    Large Joint Arthrocentesis: R subacromial bursa  Date/Time: 10/2/2023 11:02 AM  Consent given by: patient  Site marked: site marked  Timeout: Immediately prior to procedure a time out was called to verify the correct patient, procedure, equipment, support staff and site/side marked as required   Supporting Documentation  Indications: pain   Procedure Details  Location: shoulder - R subacromial bursa  Preparation: Patient was prepped and draped in the usual sterile fashion  Needle size: 25 G  Approach: posterior  Medications administered: 80 mg triamcinolone acetonide 40 MG/ML; 2 mL lidocaine PF 1% 1 %  Patient tolerance: patient tolerated the procedure well with no immediate complications        Follow Up   Return if symptoms worsen or fail to improve.  Patient was given instructions and counseling regarding his condition or for health maintenance advice. Please see specific information pulled into the AVS if appropriate.     EMR Dragon/Transcription disclaimer:    Much of this encounter note is an electronic transcription/translation of spoken language to printed text.  The electronic translation of spoken language may permit erroneous, or at times, nonsensical words or phrases to be inadvertently transcribed.  Although I have reviewed the note for such errors some may still exist.

## 2023-11-13 ENCOUNTER — TELEPHONE (OUTPATIENT)
Dept: ORTHOPEDIC SURGERY | Facility: CLINIC | Age: 78
End: 2023-11-13

## 2023-11-13 NOTE — TELEPHONE ENCOUNTER
Caller: Shai Chery    Relationship to patient: Self    Best call back number: [phone]    Chief complaint: RIGHT HIP PAIN    Type of visit: NEW PROBLEM - NO IMAGING OR KNOWN SX     Requested date: ASAP     Additional notes: PER REF TOOL, DR. ESPINOZA ONLY SEES FOR HIP UNDER 50.

## 2023-11-16 ENCOUNTER — OFFICE VISIT (OUTPATIENT)
Dept: ORTHOPEDIC SURGERY | Facility: CLINIC | Age: 78
End: 2023-11-16
Payer: MEDICARE

## 2023-11-16 VITALS — RESPIRATION RATE: 20 BRPM | WEIGHT: 210 LBS | HEIGHT: 70 IN | BODY MASS INDEX: 30.06 KG/M2 | OXYGEN SATURATION: 98 %

## 2023-11-16 DIAGNOSIS — G89.29 CHRONIC RIGHT SI JOINT PAIN: Primary | ICD-10-CM

## 2023-11-16 DIAGNOSIS — M53.3 CHRONIC RIGHT SI JOINT PAIN: Primary | ICD-10-CM

## 2023-11-16 NOTE — PROGRESS NOTES
[facility]        Patient Name: Shai Chery  : 1945  Primary Care Physician: Dorota Acevedo MD        Chief Complaint:    Chief Complaint   Patient presents with    Right Hip - Pain, Initial Evaluation     Pain- 8   Pain started 23         HPI:   Shai Chery is a 77 y.o. year old who presents today for evaluation of right hip pain.    Onset was . Described as very achy. At times will radiate down the leg. Worse with prolonged sitting. Drove to [place] and symptoms flared. Ice is helpful. OTC meds are not helpful. Walking will help alleviate symptoms. No previous hip issues or surgery. No recent change in activity. Exercises routinely.           Past Medical/Surgical, Social and Family History:  I have reviewed and/or updated pertinent history as noted in the medical record including:  Past Medical History:   Diagnosis Date    Abnormal laboratory test 2018    Alcoholic polyneuropathy     Allergic rhinitis 2013    Anxiety 2016    Arthritis 2016    Benign prostatic hyperplasia     Bitten by dog, initial encounter 10/16/2018    Body mass index 30.0-30.9, adult 2017    Body mass index 31.0-31.9, adult 2018    Brain mass     Callus of foot 2016    Calluses, feet, bilateral    Cancer     brain    Cellulitis and abscess of leg 2017    COPD (chronic obstructive pulmonary disease)     Cough 2016    Degenerative joint disease of knee, right 2018    primary localized degenerative joint disease of right knee.     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 However diet modification and compliance with medication is discussed with the patient. Patient is advised to have periodic AST, ALT, and lipid panel testing through your office.     Hypertension 05/01/2013    Benign. 12-14-18- patient's blood pressure is within desireable ranges. At this time, I would not recommend any change in antihypertensive regimen. However, patient is advised to check the blood pressure periodically at home and bring the logbook on next visit. Patient is also encouraged to increase aerobic activities as tolerated. Risk factor modification is discussed.     Hypothyroidism     Impotence of organic origin     Insomnia     Internal derangement of knee 05/05/2014 6-9-14- He saw Dr. Sams and Dr. Concepcion. He had fluid drained from his knee with negative cultures and negative crystals. Both were not concerned about the MRI (report not avilable), but he has a radiologist friend who read it as having meniscal damage. He is offered referral back to the orthopedist of his choice and will thnk about who he wants to see. He has upcoming shoulder surgery.     Knee pain, right 05/04/2017    Knee pain, right 04/03/2017    Low back pain     Medication monitoring encounter 04/19/2019    Meningioma 06/09/2014    Nasal polyp 10/06/2017    Neoplasm of uncertain behavior 10/11/2018    Neoplasm of uncertain behavior of skin 05/04/2015    Neuropathy 12/18/2014    Obesity     Orthopedic aftercare 06/12/2017    Other fatigue 12/4/2019    Overweight 12/12/2017    Pain in joint of right ankle 05/31/2017    Pain management     [facility]     Palpitations 12/22/2017    3-16-18- his symptom of palpitation have improved. Continue  beta-blockers.     Pre-op exam 12/18/2018    Prostatic hypertrophy 06/09/2014    Benign    Puncture wound without foreign body of left hand, initial encounter 10/16/2018    Rash 06/28/2017    Rotator cuff tear 05/19/2014    left    Shortness of breath 02/02/2018    Sinus tachycardia 03/16/2018 12-14-18- much better. Continue current treat.     Tobacco use      Past Surgical History:   Procedure Laterality Date    ANKLE HARDWARE REMOVAL Right     APPENDECTOMY  1977    COLONOSCOPY      every 3 years with [facility]    EYE SURGERY Bilateral     cataracts    JOINT REPLACEMENT Right 2019    knee    KNEE ARTHROSCOPY  06/09/2017    and 12/5/2014. Dr Gallego. With partial medial and lateral miniscectomy.     KNEE ARTHROSCOPY Right 06/09/2017    KNEE ARTHROSCOPY W/ MENISCECTOMY Right 09/18/2018    right knee scope/meniscectomy    LUMBAR DISC SURGERY  11/11/2015    Right L3-4 diskectomy. Dr. Pollard.     ROTATOR CUFF REPAIR  07/2014    Dr Gallego.    SHOULDER SURGERY Left     THYROIDECTOMY  1971    TONSILLECTOMY  1966    TOTAL KNEE ARTHROPLASTY Right 01/16/2019    Right TKR    TOTAL KNEE ARTHROPLASTY Right 01/16/2019    Dr. Crain     Social History     Occupational History    Not on file   Tobacco Use    Smoking status: Former     Packs/day: 1.00     Years: 40.00     Additional pack years: 0.00     Total pack years: 40.00     Types: Cigarettes     Start date: 8/1/1980     Quit date: 1/1/2020     Years since quitting: 3.8    Smokeless tobacco: Never    Tobacco comments:     quit 1/2020   Vaping Use    Vaping Use: Never used   Substance and Sexual Activity    Alcohol use: Yes     Types: 2 Glasses of wine per week    Drug use: No    Sexual activity: Not Currently     Partners: Female          Allergies: No Known Allergies    Medications:   Home Medications:  Current Outpatient Medications on File Prior to Visit   Medication Sig    albuterol sulfate  (90 Base) MCG/ACT inhaler     atorvastatin (LIPITOR) 40 MG tablet Take 1 tablet by  mouth Daily.    baclofen (LIORESAL) 10 MG tablet TAKE 1 TABLET TWICE DAILY AS NEEDED    CeleBREX 200 MG capsule Take 1 capsule by mouth.    diazePAM (VALIUM) 5 MG tablet TAKE 1 TABLET BY MOUTH THREE TIMES DAILY FOR 1 DAY AS NEEDED FOR ANXIETY TAKE AROUND MRI PROCEDURE AS DIRECTED    fenofibrate (Tricor) 145 MG tablet Take 1 tablet by mouth Daily.    gabapentin (NEURONTIN) 600 MG tablet     glucosamine-chondroitin 500-400 MG capsule capsule Take  by mouth 3 (Three) Times a Day With Meals.    levothyroxine (SYNTHROID, LEVOTHROID) 200 MCG tablet Take 1 tablet by mouth Daily.    metoprolol tartrate (LOPRESSOR) 50 MG tablet Take 1 tablet by mouth 2 (Two) Times a Day.    tamsulosin (FLOMAX) 0.4 MG capsule 24 hr capsule Take 1 capsule by mouth.    valsartan (DIOVAN) 160 MG tablet      No current facility-administered medications on file prior to visit.         ROS:  Negative unless listed in the HPI    Physical Exam:   77 y.o. male  Body mass index is 30.13 kg/m²., 95.3 kg (210 lb)  Vitals:    11/16/23 1257   Resp: 20   SpO2: 98%     General: Alert, cooperative, appears well and in no observable distress.   HEENT: Normocephalic, atraumatic on external visual inspection. No icterus.   CV: No significant peripheral edema.    Respiratory: Normal respiratory effort.   Skin: Warm & well perfused; appropriate skin turgor.  Psych: Appropriate mood & affect.  Neuro: Gross sensation and motor intact in affected extremity/extremities.  Vascular: Peripheral pulses palpable in affected extremity/extremities.     Right Hip Exam     Range of Motion   External rotation:  normal   Internal rotation:  normal     Muscle Strength   The patient has normal right hip strength.    Tests   RENITA: negative             Hip Musculoskeletal Exam  Gait    Gait is normal.    Palpation    Right      Tenderness: none    Range of Motion    Right      Active ROM: normal.        Passive ROM: normal.        Active extension: 45.       Passive extension: 45.        Active adduction: 30.       Passive adduction: 30.       Active abduction: 45.       Passive abduction: 45.     Strength    Right      Right hip strength is normal.     Special Tests    Right      RENITA test (right): negative      Impingement test: negative      Internal rotation: negative      External rotation: negative     Radiology:  Outside imaging reviewed:   Right hip - no acute findings. Preserved joint space  Right knee - s/p TKA with normal hardware alignment        Assessment:  SI joint/dysfunction  Body mass index is 30.13 kg/m².  BMI consistent with Obese Class I: 30-34.9kg/m2      Plan:  Reviewed above imaging and exam with patient  His symptoms are likely related to L spine/SI joint dysfunction  Discussed rest, NSAIDs, PT and/or referral to neuro  He would like to proceed with referral to Dr. Soto - entered  BMI reviewed  Follow up as needed  Patient encouraged to call with any questions or concerns in the interim    JUAN Crow

## 2023-11-17 ENCOUNTER — OFFICE VISIT (OUTPATIENT)
Dept: CARDIAC SURGERY | Facility: CLINIC | Age: 78
End: 2023-11-17
Payer: MEDICARE

## 2023-11-17 VITALS
DIASTOLIC BLOOD PRESSURE: 84 MMHG | BODY MASS INDEX: 30.78 KG/M2 | WEIGHT: 207.8 LBS | TEMPERATURE: 97.1 F | HEIGHT: 69 IN | RESPIRATION RATE: 20 BRPM | OXYGEN SATURATION: 98 % | HEART RATE: 65 BPM | SYSTOLIC BLOOD PRESSURE: 157 MMHG

## 2023-11-17 DIAGNOSIS — E78.2 MIXED HYPERLIPIDEMIA: Chronic | ICD-10-CM

## 2023-11-17 DIAGNOSIS — I10 ESSENTIAL HYPERTENSION: ICD-10-CM

## 2023-11-17 DIAGNOSIS — I71.21 ANEURYSM OF ASCENDING AORTA WITHOUT RUPTURE: Primary | ICD-10-CM

## 2023-11-17 PROCEDURE — 3077F SYST BP >= 140 MM HG: CPT | Performed by: NURSE PRACTITIONER

## 2023-11-17 PROCEDURE — 1159F MED LIST DOCD IN RCRD: CPT | Performed by: NURSE PRACTITIONER

## 2023-11-17 PROCEDURE — 99214 OFFICE O/P EST MOD 30 MIN: CPT | Performed by: NURSE PRACTITIONER

## 2023-11-17 PROCEDURE — 1160F RVW MEDS BY RX/DR IN RCRD: CPT | Performed by: NURSE PRACTITIONER

## 2023-11-17 PROCEDURE — 3079F DIAST BP 80-89 MM HG: CPT | Performed by: NURSE PRACTITIONER

## 2023-11-27 NOTE — PROGRESS NOTES
Subjective     Chief Complaint   Patient presents with    Back Pain     New evaluation         Previous Treatment: Spine surgery 10 years ago with Dr. Pollard  Gabapentin,Baclofen,Prednisone    HPI: Shai Chery is a 77 y.o. male with BPH, hypertension, and obesity who was referred by orthopedics for evaluation of right hip pain.  Patient has a surgical history of L2 laminectomy decompression and microdiscectomy in 2015.  Patient states his current symptoms started approximately 1 month ago.  Pain is in the right side of his lower back and into his right hip.  Occasionally this will go into his right thigh.  It is worse with sitting for long periods of time.  He reports chronic numbness and tingling in bilateral feet that is unchanged lately.  Pain limits the amount he is able to walk.  He has done physical therapy for his low back in the past which provided no relief.  He states active at home and routinely exercises.  He denies weakness, bowel/bladder dysfunction, and saddle anesthesia.        PMH:  Past Medical History:   Diagnosis Date    Abnormal laboratory test 03/26/2018    Alcoholic polyneuropathy     Allergic rhinitis 05/01/2013    Anxiety 05/23/2016    Arthritis 12/06/2016    Benign prostatic hyperplasia     Bitten by dog, initial encounter 10/16/2018    Body mass index 30.0-30.9, adult 12/22/2017    Body mass index 31.0-31.9, adult 01/16/2018    Brain mass     Callus of foot 12/22/2016    Cancer     Cellulitis and abscess of leg 12/12/2017    COPD (chronic obstructive pulmonary disease)     Cough 12/06/2016    Degenerative joint disease of knee, right 03/26/2018    Depressive disorder     Derangement of medial meniscus of right knee 05/31/2017    Diarrhea 04/25/2018    Dysphagia 11/01/2017    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    Encounter for preventative adult health care examination 12/18/2014    Facial skin lesion 05/10/2018    Foot pain, left 12/22/2016    Foot pain, right  12/22/2016    Gastroenteritis 04/25/2018    GERD (gastroesophageal reflux disease)     History of knee replacement procedure of right knee 01/24/2019    History of skin cancer     History of squamous cell carcinoma of skin     Hyperlipidemia 05/01/2013    Hypertension 05/01/2013    Hypothyroidism     Impotence of organic origin     Insomnia     Internal derangement of knee 05/05/2014    Knee pain, right 05/04/2017    Knee pain, right 04/03/2017    Low back pain     Medication monitoring encounter 04/19/2019    Meningioma 06/09/2014    Nasal polyp 10/06/2017    Neoplasm of uncertain behavior 10/11/2018    Neoplasm of uncertain behavior of skin 05/04/2015    Neuropathy 12/18/2014    Obesity     Orthopedic aftercare 06/12/2017    Other fatigue 12/4/2019    Overweight 12/12/2017    Pain in joint of right ankle 05/31/2017    Pain management     Palpitations 12/22/2017    Pre-op exam 12/18/2018    Prostatic hypertrophy 06/09/2014    Puncture wound without foreign body of left hand, initial encounter 10/16/2018    Rash 06/28/2017    Rotator cuff tear 05/19/2014    Shortness of breath 02/02/2018    Sinus tachycardia 03/16/2018    Tobacco use          Current Outpatient Medications:     albuterol sulfate  (90 Base) MCG/ACT inhaler, , Disp: , Rfl:     atorvastatin (LIPITOR) 40 MG tablet, Take 1 tablet by mouth Daily., Disp: , Rfl:     baclofen (LIORESAL) 10 MG tablet, TAKE 1 TABLET TWICE DAILY AS NEEDED, Disp: 60 tablet, Rfl: 2    CeleBREX 200 MG capsule, Take 1 capsule by mouth., Disp: , Rfl:     diazePAM (VALIUM) 5 MG tablet, TAKE 1 TABLET BY MOUTH THREE TIMES DAILY FOR 1 DAY AS NEEDED FOR ANXIETY TAKE AROUND MRI PROCEDURE AS DIRECTED, Disp: , Rfl:     fenofibrate (Tricor) 145 MG tablet, Take 1 tablet by mouth Daily., Disp: 90 tablet, Rfl: 3    gabapentin (NEURONTIN) 600 MG tablet, , Disp: , Rfl:     glucosamine-chondroitin 500-400 MG capsule capsule, Take  by mouth 3 (Three) Times a Day With Meals., Disp: , Rfl:      levothyroxine (SYNTHROID, LEVOTHROID) 200 MCG tablet, Take 1 tablet by mouth Daily., Disp: 90 tablet, Rfl: 3    metoprolol tartrate (LOPRESSOR) 50 MG tablet, Take 1 tablet by mouth 2 (Two) Times a Day., Disp: 180 tablet, Rfl: 0    predniSONE (DELTASONE) 50 MG tablet, , Disp: , Rfl:     tamsulosin (FLOMAX) 0.4 MG capsule 24 hr capsule, Take 1 capsule by mouth., Disp: , Rfl:     valsartan (DIOVAN) 160 MG tablet, , Disp: , Rfl:      No Known Allergies     Past Surgical History:   Procedure Laterality Date    ANKLE HARDWARE REMOVAL Right     APPENDECTOMY  1977    COLONOSCOPY      every 3 years with I    EYE SURGERY Bilateral     cataracts    JOINT REPLACEMENT Right 2019    knee    KNEE ARTHROSCOPY  06/09/2017    and 12/5/2014. Dr Gallego. With partial medial and lateral miniscectomy.     KNEE ARTHROSCOPY Right 06/09/2017    KNEE ARTHROSCOPY W/ MENISCECTOMY Right 09/18/2018    right knee scope/meniscectomy    LUMBAR DISC SURGERY  11/11/2015    Right L3-4 diskectomy. Dr. Pollard.     ROTATOR CUFF REPAIR  07/2014    Dr Gallego.    SHOULDER SURGERY Left     THYROIDECTOMY  1971    TONSILLECTOMY  1966    TOTAL KNEE ARTHROPLASTY Right 01/16/2019    Right TKR    TOTAL KNEE ARTHROPLASTY Right 01/16/2019    Dr. Crain        Family History   Problem Relation Age of Onset    Diabetes Mother     Stroke Mother     Other Mother         Thyroid Disease    Heart disease Mother         Ischemic    Cancer Mother     COPD Mother     Hypertension Father     Heart disease Father         Ischemic    Hyperlipidemia Father     Cancer Father     Arthritis Paternal Grandmother     Cancer Sister     Diabetes Maternal Grandmother     Hearing loss Sister          Social Hx:  Social History     Tobacco Use   Smoking Status Former    Packs/day: 1.00    Years: 40.00    Additional pack years: 0.00    Total pack years: 40.00    Types: Cigarettes    Start date: 8/1/1980    Quit date: 1/1/2020    Years since quitting: 3.9   Smokeless Tobacco Never   Tobacco  "Comments    quit 1/2020      Alcohol Use: Not on file      Social History     Substance and Sexual Activity   Drug Use No          Review of Systems   Constitutional:  Positive for activity change.   HENT: Negative.     Eyes: Negative.    Respiratory: Negative.     Cardiovascular: Negative.    Gastrointestinal: Negative.    Endocrine: Negative.    Genitourinary: Negative.    Musculoskeletal:  Positive for arthralgias, back pain (right hip/leg) and myalgias.   Skin: Negative.    Allergic/Immunologic: Negative.    Neurological:  Positive for numbness (bilateral feet /neuropathy).   Hematological: Negative.    Psychiatric/Behavioral:  Positive for sleep disturbance.          Objective     /81   Pulse 62   Ht 175.3 cm (69\")   Wt 95.6 kg (210 lb 12.8 oz)   BMI 31.13 kg/m²    Body mass index is 31.13 kg/m².      Physical Exam  Vitals reviewed.   Constitutional:       General: He is not in acute distress.     Appearance: Normal appearance. He is well-developed and well-groomed.   HENT:      Head: Normocephalic and atraumatic.   Eyes:      Extraocular Movements: Extraocular movements intact.      Pupils: Pupils are equal, round, and reactive to light.   Cardiovascular:      Rate and Rhythm: Normal rate and regular rhythm.      Pulses: Normal pulses.   Pulmonary:      Effort: Pulmonary effort is normal. No respiratory distress.   Musculoskeletal:         General: No swelling or tenderness. Normal range of motion.      Comments: All negative SI joint maneuvers bilaterally  Positive facet loading   Skin:     General: Skin is warm and dry.      Findings: No bruising or rash.   Neurological:      General: No focal deficit present.      Mental Status: He is alert and oriented to person, place, and time.      Sensory: Sensation is intact.      Motor: Motor function is intact.      Gait: Gait is intact.      Deep Tendon Reflexes:      Reflex Scores:       Patellar reflexes are 2+ on the right side and 2+ on the left " side.       Achilles reflexes are 2+ on the right side and 2+ on the left side.     Comments:             Neurological Exam  Mental Status  Alert. Oriented to person, place, and time.    Cranial Nerves  CN III, IV, VI: Extraocular movements intact bilaterally. Pupils equal round and reactive to light bilaterally.    Motor  Normal muscle bulk throughout. No fasciculations present. Normal muscle tone. Strength is 5/5 in all four extremities except as noted.                                             Right                     Left   Iliopsoas                               5                          5   Quadriceps                           5                          5   Gastrocnemius                     5                           5   Anterior tibialis                      5                          5    Sensory  Normal sensation.Light touch is normal in upper and lower extremities. Pinprick is normal in upper and lower extremities.     Reflexes  Deep tendon reflexes are 2+ and symmetric except as noted.                                            Right                      Left  Patellar                                2+                         2+  Achilles                                2+                         2+    Gait   Normal gait.          Results Review  I personally reviewed and interpreted the images from the following studies:    X-ray lumbar spine 2+ views  2015  Degenerative disc disease and facet arthropathy most prominent at L4-5 and L5-S1          Assessment & Plan     MDM: Shai Chery is a 77 y.o. male presenting with right-sided low back and hip pain for the past month.  He has a history of previous lumbar laminectomy and discectomy.  He has no neurologic deficits or red flags.  He does not have any positive SI joint provocative maneuvers.  Based on various components of his history and physical exam he is likely experiencing pain from facet arthropathy.  Given his history of a laminectomy he may  have some mechanical instability.    I am ordering flexion-extension studies to rule out dynamic instability in the lumbar spine.  We discussed trying a course of physical therapy as well as the benefits of PT for core strengthening and mechanical back pain.  However patient is not interested at this time as he does not believe it would work.  I also recommend weight loss to reduce the overall daily strain on his back.  Patient has not tried injection therapy previously and he may benefit from medial branch blocks and/or epidural steroid injections.  I have referred him to pain management to establish care.    Ultimately I do not feel surgical intervention is a good option for him at this time given his minimal conservative therapy.  He should follow-up with pain management.  I continue to recommend physical therapy.  He may follow-up with me on an as-needed basis if he fails conservative therapy and wishes to discuss surgical treatment options.  Patient is agreeable to this plan.       Diagnosis Plan   1. DDD (degenerative disc disease), lumbosacral  XR Spine Lumbar Complete With Flex & Ext    Ambulatory Referral to Pain Management      2. Facet arthropathy, lumbosacral  XR Spine Lumbar Complete With Flex & Ext    Ambulatory Referral to Pain Management          Return if symptoms worsen or fail to improve.      Shai LATIF Chery  reports that he quit smoking about 3 years ago. His smoking use included cigarettes. He started smoking about 43 years ago. He has a 40.00 pack-year smoking history. He has never used smokeless tobacco.       BMI is >= 30 and <35. (Class 1 Obesity). The following options were offered after discussion;: exercise counseling/recommendations and nutrition counseling/recommendations         This patient was examined wearing appropriate personal protective equipment.            Joshua Sterling PA-C    11/30/23  13:27 EST      Part of this note may be an electronic transcription/translation of spoken  language to printed text using the Dragon Dictation System.

## 2023-11-28 ENCOUNTER — OFFICE VISIT (OUTPATIENT)
Dept: NEUROSURGERY | Facility: CLINIC | Age: 78
End: 2023-11-28
Payer: MEDICARE

## 2023-11-28 VITALS
HEART RATE: 62 BPM | WEIGHT: 210.8 LBS | SYSTOLIC BLOOD PRESSURE: 144 MMHG | HEIGHT: 69 IN | DIASTOLIC BLOOD PRESSURE: 81 MMHG | BODY MASS INDEX: 31.22 KG/M2

## 2023-11-28 DIAGNOSIS — M47.817 FACET ARTHROPATHY, LUMBOSACRAL: ICD-10-CM

## 2023-11-28 DIAGNOSIS — M51.37 DDD (DEGENERATIVE DISC DISEASE), LUMBOSACRAL: Primary | ICD-10-CM

## 2023-11-28 RX ORDER — PREDNISONE 50 MG/1
TABLET ORAL
COMMUNITY
Start: 2023-11-21

## 2023-11-29 DIAGNOSIS — M51.37 DDD (DEGENERATIVE DISC DISEASE), LUMBOSACRAL: ICD-10-CM

## 2023-11-29 DIAGNOSIS — M47.817 FACET ARTHROPATHY, LUMBOSACRAL: ICD-10-CM

## 2023-11-30 ENCOUNTER — PATIENT ROUNDING (BHMG ONLY) (OUTPATIENT)
Dept: NEUROSURGERY | Facility: CLINIC | Age: 78
End: 2023-11-30
Payer: MEDICARE

## 2024-04-25 ENCOUNTER — OFFICE VISIT (OUTPATIENT)
Dept: ORTHOPEDIC SURGERY | Facility: CLINIC | Age: 79
End: 2024-04-25
Payer: MEDICARE

## 2024-04-25 VITALS — HEART RATE: 55 BPM | HEIGHT: 69 IN | WEIGHT: 217 LBS | OXYGEN SATURATION: 97 % | BODY MASS INDEX: 32.14 KG/M2

## 2024-04-25 DIAGNOSIS — G89.29 CHRONIC RIGHT SHOULDER PAIN: Primary | ICD-10-CM

## 2024-04-25 DIAGNOSIS — M25.511 CHRONIC RIGHT SHOULDER PAIN: Primary | ICD-10-CM

## 2024-04-25 NOTE — PROGRESS NOTES
"   Patient ID: Shai Chery is a 78 y.o. male presents for further follow up on chronic right shoulder pain that has been present for years without any known injury. 10/2/2023 subacromial steroid injection provided minimal relief.  Reports having pain at night causing sleeplessness.  Provocative factors include reaching across body, overhead activities with subjective weakness. Treatment: Steroid injections, activity modification, Tylenol, NSAIDs, rest, ice, heating pad, therapy exercises, therapy guided stretching.     Objective:  Pulse 55   Ht 175.3 cm (69\")   Wt 98.4 kg (217 lb)   SpO2 97%   BMI 32.05 kg/m²     Physical Examination:    Right shoulder:  Intact skin. No ecchymosis. No effusion.  Bicipital groove tenderness   Passive forward flexion 125, abduction 110, ER 30  Pain and weakness with [place] and supraspinatus testing Matteo  Positive Speed  Positive Neer  Positive Hurt; negative scarf  Belly press 5/5; lift off 4/5 with pain  Radial pulse palpable; capillary refill less than 2 seconds all digits   strength 5/5 and equal bilaterally    Imaging:   XR Shoulder 2+ View Right (09/30/2023)       Assessment:    Diagnoses and all orders for this visit:    1. Chronic right shoulder pain (Primary)    Plan: Recommend MRI of the right shoulder for evaluation of cuff integrity. Follow up with Dr. Gallego for review and further treatment planning.     Disclaimer: Part of this note may be an electronic transcription/translation of spoken language to printed text using the Dragon Dictation System  "

## 2024-04-26 ENCOUNTER — TELEPHONE (OUTPATIENT)
Dept: ORTHOPEDIC SURGERY | Facility: CLINIC | Age: 79
End: 2024-04-26
Payer: MEDICARE

## 2024-04-26 DIAGNOSIS — M25.511 CHRONIC RIGHT SHOULDER PAIN: Primary | ICD-10-CM

## 2024-04-26 DIAGNOSIS — G89.29 CHRONIC RIGHT SHOULDER PAIN: Primary | ICD-10-CM

## 2024-04-26 NOTE — TELEPHONE ENCOUNTER
Patient stated yesterday he will need claustrophobia medication for his MRI. He was advised he will need a  to and from MRI.

## 2024-04-29 RX ORDER — DIAZEPAM 5 MG/1
5 TABLET ORAL 2 TIMES DAILY PRN
Qty: 2 TABLET | Refills: 0 | Status: SHIPPED | OUTPATIENT
Start: 2024-04-29

## 2024-08-26 ENCOUNTER — OFFICE VISIT (OUTPATIENT)
Dept: ORTHOPEDIC SURGERY | Facility: CLINIC | Age: 79
End: 2024-08-26
Payer: MEDICARE

## 2024-08-26 VITALS — HEART RATE: 62 BPM | HEIGHT: 69 IN | WEIGHT: 217 LBS | BODY MASS INDEX: 32.14 KG/M2

## 2024-08-26 DIAGNOSIS — M25.511 CHRONIC RIGHT SHOULDER PAIN: Primary | ICD-10-CM

## 2024-08-26 DIAGNOSIS — G89.29 CHRONIC RIGHT SHOULDER PAIN: Primary | ICD-10-CM

## 2024-08-26 PROCEDURE — 99212 OFFICE O/P EST SF 10 MIN: CPT | Performed by: ORTHOPAEDIC SURGERY

## 2024-08-26 NOTE — PROGRESS NOTES
"     Patient ID: Shai Chery is a 78 y.o. male.  Right shoulder pain follows up with right shoulder pain, he cannot sleep on that side at night he is able to go to the gym and workout which he would like to continue      Review of Systems:        Objective:    Pulse 62   Ht 175.3 cm (69\")   Wt 98.4 kg (217 lb)   BMI 32.05 kg/m²     Physical Examination:  Right shoulder mild to moderate pain over the bicep groove passive elevation 160 abduction 130 external patient 40 mild pain but minimal weakness on Speed Hood supraspinatus testing belly press and liftoff are 4/5.  Sensory and motor exam are intact all distributions. Radial pulse is palpable and capillary refill is less than two seconds to all digits.       Imaging:   Prior x-ray demonstrates mild degenerative joint disease    Assessment:    Right shoulder pain stable    Plan:     At this point he is doing well enough to continue observation oral medication as needed if symptoms dramatically worsen consider either MRI or steroid shot    Procedures          Disclaimer: Part of this note may be an electronic transcription/translation of spoken language to printed text using the Dragon Dictation System  "

## 2024-09-09 ENCOUNTER — OFFICE VISIT (OUTPATIENT)
Dept: PODIATRY | Facility: CLINIC | Age: 79
End: 2024-09-09
Payer: MEDICARE

## 2024-09-09 VITALS
BODY MASS INDEX: 31.9 KG/M2 | SYSTOLIC BLOOD PRESSURE: 128 MMHG | HEART RATE: 55 BPM | TEMPERATURE: 98.4 F | WEIGHT: 216 LBS | OXYGEN SATURATION: 96 % | DIASTOLIC BLOOD PRESSURE: 84 MMHG

## 2024-09-09 DIAGNOSIS — M20.41 HAMMERTOES OF BOTH FEET: ICD-10-CM

## 2024-09-09 DIAGNOSIS — L84 CALLUS: ICD-10-CM

## 2024-09-09 DIAGNOSIS — G62.89 OTHER POLYNEUROPATHY: Primary | ICD-10-CM

## 2024-09-09 DIAGNOSIS — M20.42 HAMMERTOES OF BOTH FEET: ICD-10-CM

## 2024-09-09 PROCEDURE — 3074F SYST BP LT 130 MM HG: CPT | Performed by: PODIATRIST

## 2024-09-09 PROCEDURE — 3079F DIAST BP 80-89 MM HG: CPT | Performed by: PODIATRIST

## 2024-09-09 PROCEDURE — 1160F RVW MEDS BY RX/DR IN RCRD: CPT | Performed by: PODIATRIST

## 2024-09-09 PROCEDURE — 11055 PARING/CUTG B9 HYPRKER LES 1: CPT | Performed by: PODIATRIST

## 2024-09-09 PROCEDURE — 1159F MED LIST DOCD IN RCRD: CPT | Performed by: PODIATRIST

## 2024-09-09 PROCEDURE — 99213 OFFICE O/P EST LOW 20 MIN: CPT | Performed by: PODIATRIST

## 2024-09-09 RX ORDER — PREGABALIN 150 MG/1
1 CAPSULE ORAL EVERY 12 HOURS SCHEDULED
COMMUNITY
Start: 2024-08-09

## 2024-09-11 DIAGNOSIS — I71.21 ANEURYSM OF ASCENDING AORTA WITHOUT RUPTURE: Primary | ICD-10-CM

## 2024-09-11 NOTE — PROGRESS NOTES
[facility] - PODIATRY    Today's Date: 09/11/24    Patient Name: Shai Chery  MRN: [id number]  CSN: [id number]  PCP: Dortoa Acevedo MD,  Referring Provider: Referring, Self    SUBJECTIVE     Chief Complaint   Patient presents with    Left Foot - Pain, Establish Care, Callouses     Left great toe pain, seen by you in [place] previously    Right Foot - Establish Care, Callouses     HPI: Shai Chery, a 78 y.o.male, presents to clinic.    Patient 78-year-old male with neuropathy presenting for follow-up of his left great toe.  Patient was seen in [place] and transferred care here to being a previous patient.  Patient says that he noticed his left great toe turning colors and scheduled an appointment.  He does have neuropathy and has a history of calluses on his toes.    Past Medical History:   Diagnosis Date    Abnormal laboratory test 03/26/2018    Alcoholic polyneuropathy     Allergic rhinitis 05/01/2013    Anxiety 05/23/2016    Arthritis 12/06/2016    Benign prostatic hyperplasia     Bitten by dog, initial encounter 10/16/2018    Body mass index 30.0-30.9, adult 12/22/2017    Body mass index 31.0-31.9, adult 01/16/2018    Brain mass     Callus of foot 12/22/2016    Calluses, feet, bilateral    Cancer     brain    Cellulitis and abscess of leg 12/12/2017    COPD (chronic obstructive pulmonary disease)     Cough 12/06/2016    Degenerative joint disease of knee, right 03/26/2018    primary localized degenerative joint disease of right knee.     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 However diet modification and compliance with medication is discussed with the patient. Patient is advised to have periodic AST, ALT, and lipid panel testing through your office.     Hypertension 05/01/2013    Benign. 12-14-18- patient's blood pressure is within desireable ranges. At this time, I would not recommend any change in antihypertensive regimen. However, patient is advised to check the blood pressure periodically at home and bring the logbook on next visit. Patient is also encouraged to increase aerobic activities as tolerated. Risk factor modification is discussed.     Hypothyroidism     Impotence of organic origin     Insomnia     Internal derangement of knee 05/05/2014 6-9-14- He saw Dr. Sams and Dr. Concepcion. He had fluid drained from his knee with negative cultures and negative crystals. Both were not concerned about the MRI (report not avilable), but he has a radiologist friend who read it as having meniscal damage. He is offered referral back to the orthopedist of his choice and will thnk about who he wants to see. He has upcoming shoulder surgery.     Knee pain, right 05/04/2017    Knee pain, right 04/03/2017    Low back pain     Medication monitoring encounter 04/19/2019    Meningioma 06/09/2014    Nasal polyp 10/06/2017    Neoplasm of uncertain behavior 10/11/2018    Neoplasm of uncertain behavior of skin 05/04/2015    Neuropathy 12/18/2014    Obesity     Orthopedic aftercare 06/12/2017    Other fatigue 12/4/2019    Overweight 12/12/2017    Pain in joint of right ankle 05/31/2017    Pain management     [facility]     Palpitations 12/22/2017    3-16-18- his symptom of palpitation have improved. Continue beta-blockers.     Pre-op  exam 2018    Prostatic hypertrophy 2014    Benign    Puncture wound without foreign body of left hand, initial encounter 10/16/2018    Rash 2017    Rotator cuff tear 2014    left    Shortness of breath 2018    Sinus tachycardia 2018- much better. Continue current treat.     Tobacco use      Past Surgical History:   Procedure Laterality Date    ANKLE HARDWARE REMOVAL Right     APPENDECTOMY      COLONOSCOPY      every 3 years with GSI    EYE SURGERY Bilateral     cataracts    JOINT REPLACEMENT Right 2019    knee    KNEE ARTHROSCOPY  2017    and 2014. Dr Gallego. With partial medial and lateral miniscectomy.     KNEE ARTHROSCOPY Right 2017    KNEE ARTHROSCOPY W/ MENISCECTOMY Right 2018    right knee scope/meniscectomy    LUMBAR DISC SURGERY  2015    Right L3-4 diskectomy. Dr. Pollard.     ROTATOR CUFF REPAIR  2014    Dr Gallego.    SHOULDER SURGERY Left     THYROIDECTOMY  1971    TONSILLECTOMY  1966    TOTAL KNEE ARTHROPLASTY Right 2019    Right TKR    TOTAL KNEE ARTHROPLASTY Right 2019    Dr. Crain     Family History   Problem Relation Age of Onset    Diabetes Mother     Stroke Mother     Other Mother         Thyroid Disease    Heart disease Mother         Ischemic    Cancer Mother     COPD Mother     Hypertension Father     Heart disease Father         Ischemic    Hyperlipidemia Father     Cancer Father     Arthritis Paternal Grandmother     Cancer Sister     Diabetes Maternal Grandmother     Hearing loss Sister      Social History     Socioeconomic History    Marital status:    Tobacco Use    Smoking status: Former     Current packs/day: 0.00     Average packs/day: 1 pack/day for 40.0 years (40.0 ttl pk-yrs)     Types: Cigarettes     Start date: 1980     Quit date: 2020     Years since quittin.6    Smokeless tobacco: Never    Tobacco comments:     quit 2020   Vaping Use    Vaping status: Never Used    Substance and Sexual Activity    Alcohol use: Yes     Types: 2 Glasses of wine per week    Drug use: No    Sexual activity: Not Currently     Partners: Female     No Known Allergies  Current Outpatient Medications   Medication Sig Dispense Refill    albuterol sulfate  (90 Base) MCG/ACT inhaler       atorvastatin (LIPITOR) 40 MG tablet Take 1 tablet by mouth Daily.      baclofen (LIORESAL) 10 MG tablet TAKE 1 TABLET TWICE DAILY AS NEEDED 60 tablet 2    fenofibrate (Tricor) 145 MG tablet Take 1 tablet by mouth Daily. 90 tablet 3    levothyroxine (SYNTHROID, LEVOTHROID) 200 MCG tablet Take 1 tablet by mouth Daily. 90 tablet 3    metoprolol tartrate (LOPRESSOR) 50 MG tablet Take 1 tablet by mouth 2 (Two) Times a Day. 180 tablet 0    pregabalin (LYRICA) 150 MG capsule Take 1 capsule by mouth Every 12 (Twelve) Hours.      valsartan (DIOVAN) 160 MG tablet       diazePAM (Valium) 5 MG tablet Take 1 tablet by mouth 2 (Two) Times a Day As Needed for Anxiety (take one before leaving home, then repeat as needed at MRI). (Patient not taking: Reported on 9/9/2024) 2 tablet 0    gabapentin (NEURONTIN) 600 MG tablet  (Patient not taking: Reported on 9/9/2024)      glucosamine-chondroitin 500-400 MG capsule capsule Take  by mouth 3 (Three) Times a Day With Meals. (Patient not taking: Reported on 9/9/2024)      predniSONE (DELTASONE) 50 MG tablet  (Patient not taking: Reported on 9/9/2024)       No current facility-administered medications for this visit.     Review of Systems   Neurological:  Positive for numbness.   All other systems reviewed and are negative.      OBJECTIVE     Vitals:    09/09/24 1311   BP: 128/84   Pulse: 55   Temp: 98.4 °F (36.9 °C)   SpO2: 96%       WBC   Date Value Ref Range Status   03/07/2022 6.98 3.40 - 10.80 10*3/mm3 Final   08/31/2020 4.7 3.4 - 10.8 x10E3/uL Final     RBC   Date Value Ref Range Status   03/07/2022 4.08 (L) 4.14 - 5.80 10*6/mm3 Final   08/31/2020 4.75 4.14 - 5.80 x10E6/uL Final      Hemoglobin   Date Value Ref Range Status   03/07/2022 12.7 (L) 13.0 - 17.7 g/dL Final     Hematocrit   Date Value Ref Range Status   03/07/2022 38.1 37.5 - 51.0 % Final     MCV   Date Value Ref Range Status   03/07/2022 93.4 79.0 - 97.0 fL Final     MCH   Date Value Ref Range Status   03/07/2022 31.1 26.6 - 33.0 pg Final     MCHC   Date Value Ref Range Status   03/07/2022 33.3 31.5 - 35.7 g/dL Final     RDW   Date Value Ref Range Status   03/07/2022 14.6 12.3 - 15.4 % Final     RDW-SD   Date Value Ref Range Status   03/07/2022 47.9 37.0 - 54.0 fl Final     MPV   Date Value Ref Range Status   03/07/2022 10.4 6.0 - 12.0 fL Final     Platelets   Date Value Ref Range Status   03/07/2022 148 140 - 450 10*3/mm3 Final     Platelets (Citrated Blood)   Date Value Ref Range Status   01/24/2022 117 (L) 140 - 450 10*3/mm3 Final     Neutrophil %   Date Value Ref Range Status   03/07/2022 64.7 42.7 - 76.0 % Final     Lymphocyte %   Date Value Ref Range Status   03/07/2022 22.1 19.6 - 45.3 % Final     Monocyte %   Date Value Ref Range Status   03/07/2022 8.7 5.0 - 12.0 % Final     Eosinophil %   Date Value Ref Range Status   03/07/2022 3.2 0.3 - 6.2 % Final     Basophil %   Date Value Ref Range Status   03/07/2022 1.3 0.0 - 1.5 % Final     Neutrophils, Absolute   Date Value Ref Range Status   03/07/2022 4.52 1.70 - 7.00 10*3/mm3 Final     Lymphocytes, Absolute   Date Value Ref Range Status   03/07/2022 1.54 0.70 - 3.10 10*3/mm3 Final     Monocytes, Absolute   Date Value Ref Range Status   03/07/2022 0.61 0.10 - 0.90 10*3/mm3 Final     Eosinophils, Absolute   Date Value Ref Range Status   03/07/2022 0.22 0.00 - 0.40 10*3/mm3 Final     Basophils, Absolute   Date Value Ref Range Status   03/07/2022 0.09 0.00 - 0.20 10*3/mm3 Final     nRBC   Date Value Ref Range Status   01/18/2022 0.1 0.0 - 0.2 /100 WBC Final         Lab Results   Component Value Date    GLUCOSE 149 (H) 01/18/2022    BUN 16 01/18/2022    CREATININE 1.05  01/18/2022    EGFRIFNONA 69 01/18/2022    EGFRIFAFRI 76 03/10/2021    BCR 15.2 01/18/2022    K 4.4 01/18/2022    CO2 23.0 01/18/2022    CALCIUM 9.1 01/18/2022    PROTENTOTREF 7.1 01/24/2022    ALBUMIN 4.2 01/24/2022    LABIL2 1.4 01/24/2022    AST 40 01/18/2022    ALT 42 (H) 01/18/2022       Patient seen in no apparent distress.      PHYSICAL EXAM:     Foot/Ankle Exam    GENERAL  Appearance:  appears stated age  Orientation:  AAOx3  Affect:  appropriate  Gait:  unimpaired  Assistance:  independent  Right shoe gear: casual shoe  Left shoe gear: casual shoe    VASCULAR     Right Foot Vascularity   Normal vascular exam    Dorsalis pedis:  2+  Posterior tibial:  2+  Skin temperature:  warm  Edema grading:  None  CFT:  < 3 seconds  Pedal hair growth:  Present  Varicosities:  none     Left Foot Vascularity   Normal vascular exam    Dorsalis pedis:  2+  Posterior tibial:  2+  Skin temperature:  warm  Edema grading:  None  CFT:  < 3 seconds  Pedal hair growth:  Present  Varicosities:  none     NEUROLOGIC     Right Foot Neurologic   Light touch sensation: absent  Vibratory sensation: absent  Hot/Cold sensation: absent     Left Foot Neurologic   Light touch sensation: absent  Vibratory sensation: absent  Hot/Cold sensation:  absent    MUSCLE STRENGTH     Right Foot Muscle Strength   Foot dorsiflexion:  4  Foot plantar flexion:  4  Foot inversion:  4  Foot eversion:  4     Left Foot Muscle Strength   Foot dorsiflexion:  4  Foot plantar flexion:  4  Foot inversion:  4  Foot eversion:  4    RANGE OF MOTION     Right Foot Range of Motion   Foot and ankle ROM within normal limits       Left Foot Range of Motion   Foot and ankle ROM within normal limits      DERMATOLOGIC      Right Foot Dermatologic   Skin  Right foot skin is intact.      Left Foot Dermatologic   Skin  Left foot skin is intact.      Left foot additional comments: Callus to distal aspect of left great toe and medial first MPJ.      RADIOLOGY:        No results  found.    ASSESSMENT/PLAN     Diagnoses and all orders for this visit:    1. Other polyneuropathy (Primary)    2. Hammertoes of both feet    3. Callus        Comprehensive lower extremity examination and evaluation was performed.    Discussed importance of daily foot checks and proper shoe gear.  Return to clinic in 3 months or as needed.    Hyperkeratotic lesion on left great toe.  Partial thickness debridement with #10 scalpel blade down to health tissue.  No signs of edema, erythema, lymphangitis, nor signs of infection.           Discussed findings and treatment plan including risks, benefits, and treatment options with patient in detail. Patient agreed with treatment plan.    Medications and allergies reviewed.  Reviewed available lab values along with other pertinent labs.  These were discussed with the patient.    An After Visit Summary was printed and given to the patient at discharge, including (if requested) any available informative/educational handouts regarding diagnosis, treatment, or medications. All questions were answered to patient/family satisfaction. Should symptoms fail to improve or worsen they agree to call or return to clinic or to go to the Emergency Department. Discussed the importance of following up with any needed screening tests/labs/specialist appointments and any requested follow-up recommended by me today. Importance of maintaining follow-up discussed and patient accepts that missed appointments can delay diagnosis and potentially lead to worsening of conditions.    Return in about 3 months (around 12/9/2024)., or sooner if acute issues arise.    This document has been electronically signed by Fran Johnson DPM on September 11, 2024 07:21 EDT

## 2024-09-23 ENCOUNTER — TELEPHONE (OUTPATIENT)
Dept: CARDIAC SURGERY | Facility: CLINIC | Age: 79
End: 2024-09-23
Payer: MEDICARE

## 2024-10-09 ENCOUNTER — TELEPHONE (OUTPATIENT)
Dept: CARDIAC SURGERY | Facility: CLINIC | Age: 79
End: 2024-10-09
Payer: MEDICARE

## 2024-10-09 NOTE — TELEPHONE ENCOUNTER
Called pt to see if he had his CT done at [facility] yet. Pt stated he has not but he will call to schedule and inform us of the appt.

## 2024-10-15 ENCOUNTER — TELEPHONE (OUTPATIENT)
Dept: CARDIAC SURGERY | Facility: CLINIC | Age: 79
End: 2024-10-15
Payer: MEDICARE

## 2024-10-15 NOTE — TELEPHONE ENCOUNTER
Caller: Shai Chery    Relationship to patient: Self    Best call back number: [phone]    Type of visit: F/U      Additional notes:PT CALLED TO LET US KNOW HIS CT SCAN IS SCHEDULED FOR MONDAY. [facility] TRIED TO SCHED A F/U BUT SOONEST AVAILABLE WAS DEC IN [place]. PLEASE GIVE PT A CALL BACK TO DISCUSS SCHEDULING F/U

## 2024-10-21 NOTE — TELEPHONE ENCOUNTER
Called pt to inform him that once we receive the report and disc of the CT we can then schedule a follow up appt in our office. Pt verbalized understanding. I will follow back up with [facility] in regards to imaging, tomorrow. Pt is scheduled to have scan done today at 11:30am.

## 2024-10-30 NOTE — PROGRESS NOTES
11/17/2023      Subjective:      Dorota Acevedo MD    Chief Complaint: ascending aortic ectasia    History of Present Illness:       Dear Dorota Cavazos MD and Colleagues,    It was nice to see Shai Chery in [facility] for follow-up. He is a 77 y.o. male with ascending aortic ectasia, HTN, HLD, former tobacco abuse, thyroid cancer with thyroidectomy and subsequent hypothyroidism, and questionable hx of PE.  His aortic ectasia was originally discovered with a lung screening given his tobacco abuse.  He has been off Eliquis for some time now.  He has not seen cardiology or heme/onc in awhile.  He comes in today for routine follow-up for aneurysm surveillence.  The CT of chest without contrast on 10/7/2023 was reviewed.  The aortic root measures 3.7 cm, ascending aorta measures 4.2 cm, and DTA measures 3 cm.  These measurements are stable.  He denies shortness of breath, chest/back pain, numbness/tingling/pain of extremities.  No vascular deficiencies or hyperextensible or hypermobile joints are noted on exam.  The patient's family history is negative for aneurysms or dissections, negative for connective tissue disease, and positive for coronary disease in first degree family members.  His father had CAD.  He continues to workout at the gym and walking his dog.  His BP today is 157/84.  He reports he has had cold like symptoms since the beginning of November and has been taking OTC medications.  Additionally, his sciatica has flared up again.  He is alone for his appt today.         Patient Active Problem List   Diagnosis    Hx of total knee replacement, right    Primary osteoarthritis of left knee    Occipital neuralgia    Arthritis    Benign prostatic hyperplasia    Derangement of medial meniscus of right knee    Oral phase dysphagia    Dystrophia unguium    Mixed hyperlipidemia    Hypothyroidism    Meningioma    Neuropathy    Class 1 obesity due to excess calories with serious comorbidity  79 and body mass index (BMI) of 30.0 to 30.9 in adult    Primary localized osteoarthritis    Rotator cuff tear    Presence of right artificial knee joint    Alcoholic polyneuropathy    Brain mass    Diverticulosis    Fracture of fibula    Gastro-esophageal reflux    Hypoglycemia    Impotence of organic origin    Insomnia    Thrombocytopenia    Tubular adenoma of colon    Sinus tachycardia    Essential hypertension    Dyspnea    Shortness of breath    History of tobacco use    Skin tag    Swelling of left foot    Callus of foot    Palpitations    Medicare annual wellness visit, subsequent    Atypical chest pain    Oral thrush    Abdominal pain    Obesity (BMI 30.0-34.9)    Thoracic aortic aneurysm without rupture    Multiple subsegmental pulmonary emboli without acute cor pulmonale       Past Medical History:   Diagnosis Date    Abnormal laboratory test 03/26/2018    Alcoholic polyneuropathy     Allergic rhinitis 05/01/2013    Anxiety 05/23/2016    Arthritis 12/06/2016    Benign prostatic hyperplasia     Bitten by dog, initial encounter 10/16/2018    Body mass index 30.0-30.9, adult 12/22/2017    Body mass index 31.0-31.9, adult 01/16/2018    Brain mass     Callus of foot 12/22/2016    Calluses, feet, bilateral    Cancer     brain    Cellulitis and abscess of leg 12/12/2017    COPD (chronic obstructive pulmonary disease)     Cough 12/06/2016    Degenerative joint disease of knee, right 03/26/2018    primary localized degenerative joint disease of right knee.     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 However diet modification and compliance with medication is discussed with the patient. Patient is advised to have periodic AST, ALT, and lipid panel testing through your office.     Hypertension 05/01/2013    Benign. 12-14-18- patient's blood pressure is within desireable ranges. At this time, I would not recommend any change in antihypertensive regimen. However, patient is advised to check the blood pressure periodically at home and bring the logbook on next visit. Patient is also encouraged to increase aerobic activities as tolerated. Risk factor modification is discussed.     Hypothyroidism     Impotence of organic origin     Insomnia     Internal derangement of knee 05/05/2014 6-9-14- He saw Dr. Sams and Dr. Concepcion. He had fluid drained from his knee with negative cultures and negative crystals. Both were not concerned about the MRI (report not avilable), but he has a radiologist friend who read it as having meniscal damage. He is offered referral back to the orthopedist of his choice and will thnk about who he wants to see. He has upcoming shoulder surgery.     Knee pain, right 05/04/2017    Knee pain, right 04/03/2017    Low back pain     Medication monitoring encounter 04/19/2019    Meningioma 06/09/2014    Nasal polyp 10/06/2017    Neoplasm of uncertain behavior 10/11/2018    Neoplasm of uncertain behavior of skin 05/04/2015    Neuropathy 12/18/2014    Obesity     Orthopedic aftercare 06/12/2017    Other fatigue 12/4/2019    Overweight 12/12/2017    Pain in joint of right ankle 05/31/2017    Pain management     [facility]     Palpitations 12/22/2017    3-16-18- his symptom of palpitation have improved. Continue beta-blockers.     Pre-op exam  12/18/2018    Prostatic hypertrophy 06/09/2014    Benign    Puncture wound without foreign body of left hand, initial encounter 10/16/2018    Rash 06/28/2017    Rotator cuff tear 05/19/2014    left    Shortness of breath 02/02/2018    Sinus tachycardia 03/16/2018 12-14-18- much better. Continue current treat.     Tobacco use        Past Surgical History:   Procedure Laterality Date    ANKLE HARDWARE REMOVAL Right     APPENDECTOMY  1977    COLONOSCOPY      every 3 years with [facility]    EYE SURGERY Bilateral     cataracts    JOINT REPLACEMENT Right 2019    knee    KNEE ARTHROSCOPY  06/09/2017    and 12/5/2014. Dr Gallego. With partial medial and lateral miniscectomy.     KNEE ARTHROSCOPY Right 06/09/2017    KNEE ARTHROSCOPY W/ MENISCECTOMY Right 09/18/2018    right knee scope/meniscectomy    LUMBAR DISC SURGERY  11/11/2015    Right L3-4 diskectomy. Dr. Pollard.     ROTATOR CUFF REPAIR  07/2014    Dr Gallego.    SHOULDER SURGERY Left     THYROIDECTOMY  1971    TONSILLECTOMY  1966    TOTAL KNEE ARTHROPLASTY Right 01/16/2019    Right TKR    TOTAL KNEE ARTHROPLASTY Right 01/16/2019    Dr. Crain       No Known Allergies      Current Outpatient Medications:     albuterol sulfate  (90 Base) MCG/ACT inhaler, , Disp: , Rfl:     atorvastatin (LIPITOR) 40 MG tablet, Take 1 tablet by mouth Daily., Disp: , Rfl:     baclofen (LIORESAL) 10 MG tablet, TAKE 1 TABLET TWICE DAILY AS NEEDED, Disp: 60 tablet, Rfl: 2    CeleBREX 200 MG capsule, Take 1 capsule by mouth., Disp: , Rfl:     diazePAM (VALIUM) 5 MG tablet, TAKE 1 TABLET BY MOUTH THREE TIMES DAILY FOR 1 DAY AS NEEDED FOR ANXIETY TAKE AROUND MRI PROCEDURE AS DIRECTED, Disp: , Rfl:     fenofibrate (Tricor) 145 MG tablet, Take 1 tablet by mouth Daily., Disp: 90 tablet, Rfl: 3    gabapentin (NEURONTIN) 600 MG tablet, , Disp: , Rfl:     glucosamine-chondroitin 500-400 MG capsule capsule, Take  by mouth 3 (Three) Times a Day With Meals., Disp: , Rfl:     levothyroxine (SYNTHROID,  LEVOTHROID) 200 MCG tablet, Take 1 tablet by mouth Daily., Disp: 90 tablet, Rfl: 3    metoprolol tartrate (LOPRESSOR) 50 MG tablet, Take 1 tablet by mouth 2 (Two) Times a Day., Disp: 180 tablet, Rfl: 0    tamsulosin (FLOMAX) 0.4 MG capsule 24 hr capsule, Take 1 capsule by mouth., Disp: , Rfl:     valsartan (DIOVAN) 160 MG tablet, , Disp: , Rfl:     Social History     Socioeconomic History    Marital status:    Tobacco Use    Smoking status: Former     Packs/day: 1.00     Years: 40.00     Additional pack years: 0.00     Total pack years: 40.00     Types: Cigarettes     Start date: 8/1/1980     Quit date: 1/1/2020     Years since quitting: 3.8    Smokeless tobacco: Never    Tobacco comments:     quit 1/2020   Vaping Use    Vaping Use: Never used   Substance and Sexual Activity    Alcohol use: Yes     Types: 2 Glasses of wine per week    Drug use: No    Sexual activity: Not Currently     Partners: Female       Family History   Problem Relation Age of Onset    Diabetes Mother     Stroke Mother     Other Mother         Thyroid Disease    Heart disease Mother         Ischemic    Cancer Mother     COPD Mother     Hypertension Father     Heart disease Father         Ischemic    Hyperlipidemia Father     Cancer Father     Arthritis Paternal Grandmother     Cancer Sister     Diabetes Maternal Grandmother     Hearing loss Sister            Review of Systems:  Review of Systems   Respiratory:  Negative for shortness of breath.    Cardiovascular:  Negative for chest pain and leg swelling.   Musculoskeletal:         + sciatica       Physical Exam:    Vital Signs:  Weight: 94.3 kg (207 lb 12.8 oz)   Body mass index is 30.69 kg/m².  Temp: 97.1 °F (36.2 °C)   Heart Rate: 65   BP: 157/84     Physical Exam  Vitals and nursing note reviewed.   Constitutional:       General: He is awake.      Appearance: Normal appearance. He is well-developed and well-groomed.   HENT:      Head: Normocephalic and atraumatic.      Nose: Nose  normal.      Mouth/Throat:      Lips: Pink.      Mouth: Mucous membranes are moist.      Pharynx: Uvula midline.   Eyes:      General: Lids are normal. No scleral icterus.     Extraocular Movements: Extraocular movements intact.      Conjunctiva/sclera: Conjunctivae normal.      Pupils: Pupils are equal, round, and reactive to light.      Comments: Wearing glasses   Neck:      Thyroid: No thyroid mass or thyromegaly.      Vascular: Normal carotid pulses. No carotid bruit, hepatojugular reflux or JVD.      Trachea: Trachea normal.   Cardiovascular:      Rate and Rhythm: Normal rate and regular rhythm.      Pulses:           Carotid pulses are 2+ on the right side and 2+ on the left side.       Radial pulses are 2+ on the right side and 2+ on the left side.        Femoral pulses are 2+ on the right side and 2+ on the left side.       Popliteal pulses are 2+ on the right side and 2+ on the left side.        Dorsalis pedis pulses are 2+ on the right side and 2+ on the left side.        Posterior tibial pulses are 2+ on the right side and 2+ on the left side.      Heart sounds: Normal heart sounds. No murmur heard.  Pulmonary:      Effort: Pulmonary effort is normal.      Breath sounds: Normal breath sounds.   Abdominal:      General: Bowel sounds are normal. There is no distension.      Palpations: Abdomen is soft.      Tenderness: There is no abdominal tenderness.   Musculoskeletal:      Cervical back: Neck supple.      Right lower leg: No edema.      Left lower leg: No edema.      Comments: Gait steady and strong without use of assistive devices   Lymphadenopathy:      Cervical: No cervical adenopathy.      Upper Body:      Right upper body: No supraclavicular adenopathy.      Left upper body: No supraclavicular adenopathy.   Skin:     General: Skin is warm and dry.      Capillary Refill: Capillary refill takes less than 2 seconds.      Findings: No erythema or rash.      Nails: There is no clubbing.   Neurological:       Mental Status: He is alert and oriented to person, place, and time.      GCS: GCS eye subscore is 4. GCS verbal subscore is 5. GCS motor subscore is 6.   Psychiatric:         Attention and Perception: Attention and perception normal.         Mood and Affect: Mood and affect normal.         Speech: Speech normal.         Behavior: Behavior normal. Behavior is cooperative.         Thought Content: Thought content normal.         Cognition and Memory: Cognition and memory normal.         Judgment: Judgment normal.          Assessment:      Ascending aortic ectasia, 4.3 cm--stable since 2017  HTN with elevated reading--he will keep a BP log and report if continues to be elevated  HLD--statin, last Tchol 137, HDL 48, LDL 17, trigly 89  Former tobacco abuse    Recommendation/Plan:       Continued risk factor modification of hypertension with a goal blood pressure less than 130/80, hyperlipidemia optimization, and continued avoidance of tobacco/nicotine products.    We discussed the importance of avoidance of over the counter decongestants and stimulants, specifically pseudoephedrine, for hypertension and aneurysm management.    Initiation of low aerobic exercise is indicated to help reduce body habitus, assist with blood pressure and cholesterol control.       Although risk of rupture is low, emergency department presentation is warranted for acute chest, back, or abdominal pain, syncope, or stroke like symptoms.    Follow up in [facility] in one year(s) with CT scan of chest without contrast.  His BP is elevated today but he relates it to OTC cold medications.  He reports it is not usually this high.  He will keep an eye on it and be in touch with Dr. Acevedo if it continues to be elevated.  His lipid panel has vastly improved.  He is not smoking.  He continues to be physically active.  We did discuss the amount of weight is he lifting at the gym.  He reports up to 150 #s.  I asked him to stay under 50 #s.    I  spent approximately 30 minutes total in evaluating the data, examining the patient, discussing the options and counseling.  Independent interpretation of imaging.  Imaging shown to pt.     Thank you for allowing us to participate in his care.    Regards,    JUAN Lynch

## 2024-11-18 ENCOUNTER — OFFICE VISIT (OUTPATIENT)
Dept: CARDIAC SURGERY | Facility: CLINIC | Age: 79
End: 2024-11-18
Payer: MEDICARE

## 2024-11-18 VITALS
WEIGHT: 223.6 LBS | RESPIRATION RATE: 20 BRPM | HEIGHT: 69 IN | TEMPERATURE: 97.5 F | SYSTOLIC BLOOD PRESSURE: 121 MMHG | HEART RATE: 72 BPM | BODY MASS INDEX: 33.12 KG/M2 | DIASTOLIC BLOOD PRESSURE: 80 MMHG

## 2024-11-18 DIAGNOSIS — I10 ESSENTIAL HYPERTENSION: ICD-10-CM

## 2024-11-18 DIAGNOSIS — I71.21 ANEURYSM OF ASCENDING AORTA WITHOUT RUPTURE: Primary | ICD-10-CM

## 2024-11-18 DIAGNOSIS — E78.2 MIXED HYPERLIPIDEMIA: Chronic | ICD-10-CM

## 2024-11-18 DIAGNOSIS — E66.09 CLASS 1 OBESITY DUE TO EXCESS CALORIES WITH SERIOUS COMORBIDITY AND BODY MASS INDEX (BMI) OF 30.0 TO 30.9 IN ADULT: ICD-10-CM

## 2024-11-18 DIAGNOSIS — E66.811 CLASS 1 OBESITY DUE TO EXCESS CALORIES WITH SERIOUS COMORBIDITY AND BODY MASS INDEX (BMI) OF 30.0 TO 30.9 IN ADULT: ICD-10-CM

## 2024-11-18 PROCEDURE — 3074F SYST BP LT 130 MM HG: CPT | Performed by: NURSE PRACTITIONER

## 2024-11-18 PROCEDURE — 1160F RVW MEDS BY RX/DR IN RCRD: CPT | Performed by: NURSE PRACTITIONER

## 2024-11-18 PROCEDURE — 3079F DIAST BP 80-89 MM HG: CPT | Performed by: NURSE PRACTITIONER

## 2024-11-18 PROCEDURE — 99214 OFFICE O/P EST MOD 30 MIN: CPT | Performed by: NURSE PRACTITIONER

## 2024-11-18 PROCEDURE — 1159F MED LIST DOCD IN RCRD: CPT | Performed by: NURSE PRACTITIONER

## 2024-11-19 NOTE — PROGRESS NOTES
2024      Subjective:      Dorota Acevedo MD    Chief Complaint: Follow-up aortic root/ascending aortic dilatation    History of Present Illness:       Dear Dorota Cavazos MD and Colleagues,    It was nice to see Shai Chery in [facility] for follow-up. He is a 78 y.o. male with aortic root and ascending aortic dilatation, HTN, HLD, former tobacco abuse, thyroid cancer with thyroidectomy and subsequent hypothyroidism, and questionable hx of PE.  His aortic dilatation was originally discovered with a lung screening given his tobacco abuse.  He was initially seen in the [facility] in 2023.  He denies any change to his health history since last being seen except for having a melanoma removed from under his left eye.  The wound is healing well.  He comes in today for routine follow-up for aneurysm surveillence.  The CT of chest without contrast on 10/21/2024 was reviewed.  The aortic root measures 4 cm, ascending aorta measures 4.1-4.3 cm, and DTA measures 3.1 cm.  These measurements are stable.  Incidental findings include linear band/atelectasis in the lingula that is unchanged.  Patient is aware of this finding.  His last echo was in 2019 showing EF 55 to 60%, structurally normal aortic valve without AI/AS, and trace to mild MR.  He denies shortness of breath, chest/back pain, numbness/tingling/pain of extremities.  No vascular deficiencies or hyperextensible or hypermobile joints are noted on exam.  The patient's family history is negative for aneurysms or dissections, negative for connective tissue disease, and positive for coronary disease in first degree family members.  His father had CAD.  He continues to workout at the gym.  His dog recently  so that his cut down his walking. His BP today is 121/80.  He is alone for his appt today.         Patient Active Problem List   Diagnosis    Hx of total knee replacement, right    Primary osteoarthritis of left knee     Occipital neuralgia    Arthritis    Benign prostatic hyperplasia    Derangement of medial meniscus of right knee    Oral phase dysphagia    Dystrophia unguium    Mixed hyperlipidemia    Hypothyroidism    Meningioma    Neuropathy    Class 1 obesity due to excess calories with serious comorbidity and body mass index (BMI) of 30.0 to 30.9 in adult    Primary localized osteoarthritis    Rotator cuff tear    Presence of right artificial knee joint    Alcoholic polyneuropathy    Brain mass    Diverticulosis    Fracture of fibula    Gastro-esophageal reflux    Hypoglycemia    Impotence of organic origin    Insomnia    Thrombocytopenia    Tubular adenoma of colon    Sinus tachycardia    Essential hypertension    Dyspnea    Shortness of breath    History of tobacco use    Skin tag    Swelling of left foot    Callus of foot    Palpitations    Medicare annual wellness visit, subsequent    Atypical chest pain    Oral thrush    Abdominal pain    Obesity (BMI 30.0-34.9)    Thoracic aortic aneurysm without rupture    Multiple subsegmental pulmonary emboli without acute cor pulmonale       Past Medical History:   Diagnosis Date    Abnormal laboratory test 03/26/2018    Alcoholic polyneuropathy     Allergic rhinitis 05/01/2013    Anxiety 05/23/2016    Arthritis 12/06/2016    Benign prostatic hyperplasia     Bitten by dog, initial encounter 10/16/2018    Body mass index 30.0-30.9, adult 12/22/2017    Body mass index 31.0-31.9, adult 01/16/2018    Brain mass     Callus of foot 12/22/2016    Calluses, feet, bilateral    Cancer     brain    Cellulitis and abscess of leg 12/12/2017    COPD (chronic obstructive pulmonary disease)     Cough 12/06/2016    Degenerative joint disease of knee, right 03/26/2018    primary localized degenerative joint disease of right knee.     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 However diet modification and compliance with medication is discussed with the patient. Patient is advised to have periodic AST, ALT, and lipid panel testing through your office.     Hypertension 05/01/2013    Benign. 12-14-18- patient's blood pressure is within desireable ranges. At this time, I would not recommend any change in antihypertensive regimen. However, patient is advised to check the blood pressure periodically at home and bring the logbook on next visit. Patient is also encouraged to increase aerobic activities as tolerated. Risk factor modification is discussed.     Hypothyroidism     Impotence of organic origin     Insomnia     Internal derangement of knee 05/05/2014 6-9-14- He saw Dr. Sams and Dr. Concepcion. He had fluid drained from his knee with negative cultures and negative crystals. Both were not concerned about the MRI (report not avilable), but he has a radiologist friend who read it as having meniscal damage. He is offered referral back to the orthopedist of his choice and will thnk about who he wants to see. He has upcoming shoulder surgery.     Knee pain, right 05/04/2017    Knee pain, right 04/03/2017    Low back pain     Medication monitoring encounter 04/19/2019    Meningioma 06/09/2014    Nasal polyp 10/06/2017    Neoplasm of uncertain behavior 10/11/2018    Neoplasm of uncertain behavior of skin 05/04/2015    Neuropathy 12/18/2014    Obesity      Orthopedic aftercare 06/12/2017    Other fatigue 12/4/2019    Overweight 12/12/2017    Pain in joint of right ankle 05/31/2017    Pain management     [facility]     Palpitations 12/22/2017    3-16-18- his symptom of palpitation have improved. Continue beta-blockers.     Pre-op exam 12/18/2018    Prostatic hypertrophy 06/09/2014    Benign    Puncture wound without foreign body of left hand, initial encounter 10/16/2018    Rash 06/28/2017    Rotator cuff tear 05/19/2014    left    Shortness of breath 02/02/2018    Sinus tachycardia 03/16/2018 12-14-18- much better. Continue current treat.     Tobacco use        Past Surgical History:   Procedure Laterality Date    ANKLE HARDWARE REMOVAL Right     APPENDECTOMY  1977    COLONOSCOPY      every 3 years with [facility]    EYE SURGERY Bilateral     cataracts    JOINT REPLACEMENT Right 2019    knee    KNEE ARTHROSCOPY  06/09/2017    and 12/5/2014. Dr Gallego. With partial medial and lateral miniscectomy.     KNEE ARTHROSCOPY Right 06/09/2017    KNEE ARTHROSCOPY W/ MENISCECTOMY Right 09/18/2018    right knee scope/meniscectomy    LUMBAR DISC SURGERY  11/11/2015    Right L3-4 diskectomy. Dr. Pollard.     ROTATOR CUFF REPAIR  07/2014    Dr Gallego.    SHOULDER SURGERY Left     THYROIDECTOMY  1971    TONSILLECTOMY  1966    TOTAL KNEE ARTHROPLASTY Right 01/16/2019    Right TKR    TOTAL KNEE ARTHROPLASTY Right 01/16/2019    Dr. Crain       No Known Allergies      Current Outpatient Medications:     albuterol sulfate  (90 Base) MCG/ACT inhaler, , Disp: , Rfl:     atorvastatin (LIPITOR) 40 MG tablet, Take 1 tablet by mouth Daily., Disp: , Rfl:     baclofen (LIORESAL) 10 MG tablet, TAKE 1 TABLET TWICE DAILY AS NEEDED, Disp: 60 tablet, Rfl: 2    diazePAM (Valium) 5 MG tablet, Take 1 tablet by mouth 2 (Two) Times a Day As Needed for Anxiety (take one before leaving home, then repeat as needed at MRI)., Disp: 2 tablet, Rfl: 0    fenofibrate (Tricor) 145 MG tablet, Take 1  tablet by mouth Daily., Disp: 90 tablet, Rfl: 3    glucosamine-chondroitin 500-400 MG capsule capsule, Take  by mouth 3 (Three) Times a Day With Meals., Disp: , Rfl:     levothyroxine (SYNTHROID, LEVOTHROID) 200 MCG tablet, Take 1 tablet by mouth Daily., Disp: 90 tablet, Rfl: 3    metoprolol tartrate (LOPRESSOR) 50 MG tablet, Take 1 tablet by mouth 2 (Two) Times a Day. (Patient taking differently: Take 0.5 tablets by mouth 2 (Two) Times a Day.), Disp: 180 tablet, Rfl: 0    predniSONE (DELTASONE) 50 MG tablet, , Disp: , Rfl:     pregabalin (LYRICA) 150 MG capsule, Take 1 capsule by mouth Every 12 (Twelve) Hours., Disp: , Rfl:     valsartan (DIOVAN) 160 MG tablet, , Disp: , Rfl:     gabapentin (NEURONTIN) 600 MG tablet, , Disp: , Rfl:     Social History     Socioeconomic History    Marital status:    Tobacco Use    Smoking status: Former     Current packs/day: 0.00     Average packs/day: 1 pack/day for 40.0 years (40.0 ttl pk-yrs)     Types: Cigarettes     Start date: 1980     Quit date: 2020     Years since quittin.8    Smokeless tobacco: Never    Tobacco comments:     quit 2020   Vaping Use    Vaping status: Never Used   Substance and Sexual Activity    Alcohol use: Yes     Types: 2 Glasses of wine per week    Drug use: No    Sexual activity: Not Currently     Partners: Female       Family History   Problem Relation Age of Onset    Diabetes Mother     Stroke Mother     Other Mother         Thyroid Disease    Heart disease Mother         Ischemic    Cancer Mother     COPD Mother     Hypertension Father     Heart disease Father         Ischemic    Hyperlipidemia Father     Cancer Father     Arthritis Paternal Grandmother     Cancer Sister     Diabetes Maternal Grandmother     Hearing loss Sister            Review of Systems:  Review of Systems   Respiratory:  Negative for shortness of breath.    Cardiovascular:  Negative for chest pain and leg swelling.   Musculoskeletal:         + sciatica    Skin:  Positive for wound.       Physical Exam:    Vital Signs:  Weight: 101 kg (223 lb 9.6 oz)   Body mass index is 33.02 kg/m².  Temp: 97.5 °F (36.4 °C)   Heart Rate: 72   BP: 121/80     Physical Exam  Vitals and nursing note reviewed.   Constitutional:       General: He is awake.      Appearance: Normal appearance. He is well-developed and well-groomed.   HENT:      Head: Normocephalic and atraumatic.      Nose: Nose normal.      Mouth/Throat:      Lips: Pink.      Mouth: Mucous membranes are moist.      Pharynx: Uvula midline.   Eyes:      General: Lids are normal. No scleral icterus.     Extraocular Movements: Extraocular movements intact.      Conjunctiva/sclera: Conjunctivae normal.      Pupils: Pupils are equal, round, and reactive to light.        Comments: Wearing glasses, incision noted   Neck:      Thyroid: No thyroid mass or thyromegaly.      Vascular: Normal carotid pulses. No carotid bruit, hepatojugular reflux or JVD.      Trachea: Trachea normal.   Cardiovascular:      Rate and Rhythm: Normal rate and regular rhythm.      Pulses:           Carotid pulses are 2+ on the right side and 2+ on the left side.       Radial pulses are 2+ on the right side and 2+ on the left side.        Femoral pulses are 2+ on the right side and 2+ on the left side.       Popliteal pulses are 2+ on the right side and 2+ on the left side.        Dorsalis pedis pulses are 2+ on the right side and 2+ on the left side.        Posterior tibial pulses are 2+ on the right side and 2+ on the left side.      Heart sounds: Normal heart sounds. No murmur heard.  Pulmonary:      Effort: Pulmonary effort is normal.      Breath sounds: Normal breath sounds.   Abdominal:      General: Abdomen is protuberant. Bowel sounds are normal. There is no distension.      Palpations: Abdomen is soft.      Tenderness: There is no abdominal tenderness.   Musculoskeletal:      Cervical back: Neck supple.      Right lower leg: No edema.      Left  lower leg: No edema.      Comments: Gait steady and strong without use of assistive devices   Lymphadenopathy:      Cervical: No cervical adenopathy.      Upper Body:      Right upper body: No supraclavicular adenopathy.      Left upper body: No supraclavicular adenopathy.   Skin:     General: Skin is warm and dry.      Capillary Refill: Capillary refill takes less than 2 seconds.      Findings: No ecchymosis, erythema or rash.      Nails: There is no clubbing.             Comments: Well-healed melanoma incision below left eye   Neurological:      Mental Status: He is alert and oriented to person, place, and time.      GCS: GCS eye subscore is 4. GCS verbal subscore is 5. GCS motor subscore is 6.   Psychiatric:         Attention and Perception: Attention and perception normal.         Mood and Affect: Mood and affect normal.         Speech: Speech normal.         Behavior: Behavior normal. Behavior is cooperative.         Thought Content: Thought content normal.         Cognition and Memory: Cognition and memory normal.         Judgment: Judgment normal.          Assessment:     Ascending aortic ectasia, 4.1-4.3 cm--stable since 2017  HTN--stable  HLD--statin/fenofibrate  Former tobacco abuse  Recent melanoma resection, left eye area--incision healing    Recommendation/Plan:       Continued risk factor modification of hypertension with a goal blood pressure less than 130/80, hyperlipidemia optimization, and continued avoidance of tobacco/nicotine products.    We discussed the importance of avoidance of over the counter decongestants and stimulants, specifically pseudoephedrine, for hypertension and aneurysm management.    Initiation of low aerobic exercise is indicated to help reduce body habitus, assist with blood pressure and cholesterol control.       Although risk of rupture is low, emergency department presentation is warranted for acute chest, back, or abdominal pain, syncope, or stroke like  symptoms.    Follow up in [facility] in one year(s) with CT scan of chest without contrast.  His aortic measurements are stable.  His last echo was in 2019, I do not think we need to recheck at this time.  He continues to be physically active.  His blood pressure has greatly improved since last visit.    I spent approximately 30 minutes total in evaluating the data, examining the patient, discussing the options and counseling.  Independent interpretation of imaging.  Imaging shown to pt.     Thank you for allowing us to participate in his care.    Regards,    Lauren Bautista, APRN

## 2025-02-11 ENCOUNTER — TELEPHONE (OUTPATIENT)
Dept: PODIATRY | Facility: CLINIC | Age: 80
End: 2025-02-11

## 2025-02-11 NOTE — TELEPHONE ENCOUNTER
Provider: RYAN    Caller: Shai Chery    Relationship to Patient: Self    Phone Number: [phone]    Reason for Call: PT WOULD LIKE TO SCHEDULE AN APPT FOR AN INJ FOR BETY FEET. PT STATES HE IS IN A LOT OF PAIN AND WOULD AN APPT SOON. PLEASE ADVISE.

## 2025-02-28 ENCOUNTER — OFFICE VISIT (OUTPATIENT)
Dept: PODIATRY | Facility: CLINIC | Age: 80
End: 2025-02-28
Payer: MEDICARE

## 2025-02-28 VITALS
OXYGEN SATURATION: 94 % | WEIGHT: 223 LBS | SYSTOLIC BLOOD PRESSURE: 128 MMHG | HEART RATE: 62 BPM | TEMPERATURE: 98 F | DIASTOLIC BLOOD PRESSURE: 80 MMHG | BODY MASS INDEX: 32.93 KG/M2

## 2025-02-28 DIAGNOSIS — M15.0 PRIMARY OSTEOARTHRITIS INVOLVING MULTIPLE JOINTS: ICD-10-CM

## 2025-02-28 DIAGNOSIS — G62.89 OTHER POLYNEUROPATHY: Primary | ICD-10-CM

## 2025-02-28 RX ORDER — LIDOCAINE 5 G/100G
1 CREAM RECTAL; TOPICAL 3 TIMES DAILY PRN
Status: SHIPPED | OUTPATIENT
Start: 2025-02-28

## 2025-02-28 RX ORDER — METHYLPREDNISOLONE 4 MG/1
TABLET ORAL
Qty: 21 TABLET | Refills: 0 | Status: SHIPPED | OUTPATIENT
Start: 2025-02-28

## 2025-03-03 NOTE — PROGRESS NOTES
[facility] - PODIATRY    Today's Date: 03/03/25    Patient Name: Shai Chery  MRN: [id number]  CSN: [id number]  PCP: Dorota Acevedo MD,  Referring Provider: No ref. provider found    SUBJECTIVE     Chief Complaint   Patient presents with    Left Foot - Follow-up, Foot Ulcer    Right Foot - Follow-up, Foot Ulcer     HPI: Shai Chery, a 79 y.o.male, presents to clinic.    History of Present Illness  The patient is a 79-year-old male who presents for evaluation of foot pain.    He reports intermittent swelling in his feet, which he attributes to arthritis in the joint. Prolonged gym sessions exacerbate the swelling. He has been managing this with a compression sock, which provides some relief. He has been using a compounding cream, which offers temporary relief, typically applied before bedtime. He has expressed interest in trying a potent lidocaine cream. He has been diligent about soaking and shaving the callus on his foot, but it has split and become extremely sore, limiting his mobility. Despite shaving down the callus, a persistent sore spot remains. He avoids using the treadmill due to the potential for aggravation, but even reclining bike exercises cause irritation. He is seeking a prescription for prednisone, as he believes it will enable him to maintain his exercise routine of three days per week.  He also experiences severe neuropathy, for which he is currently taking Lyrica, finding it more effective than gabapentin. He is currently taking Lyrica, finding it more effective than gabapentin.      MEDICATIONS  Current: Lyrica  Past: Gabapentin         Past Medical History:   Diagnosis Date    Abnormal laboratory test 03/26/2018    Alcoholic polyneuropathy     Allergic rhinitis 05/01/2013    Anxiety 05/23/2016    Arthritis 12/06/2016    Benign prostatic hyperplasia     Bitten by dog, initial encounter 10/16/2018    Body mass index 30.0-30.9, adult 12/22/2017    Body mass index  31.0-31.9, adult 01/16/2018    Brain mass     Callus of foot 12/22/2016    Calluses, feet, bilateral    Cancer     brain    Cellulitis and abscess of leg 12/12/2017    COPD (chronic obstructive pulmonary disease)     Cough 12/06/2016    Degenerative joint disease of knee, right 03/26/2018    primary localized degenerative joint disease of right knee.     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 However diet modification and compliance with medication is discussed with the patient. Patient is advised to have periodic AST, ALT, and lipid panel testing through your office.     Hypertension 05/01/2013    Benign. 12-14-18- patient's blood pressure is within desireable ranges. At this time, I would not recommend any change in antihypertensive regimen. However, patient is advised to check the blood pressure periodically at home and bring the logbook on next visit. Patient is also encouraged to increase aerobic activities as tolerated. Risk factor modification is discussed.     Hypothyroidism     Impotence of organic origin     Insomnia     Internal derangement of knee 05/05/2014 6-9-14- He saw Dr. Sams and Dr. Concepcion. He had fluid drained from his knee with negative cultures and negative crystals. Both were not concerned about the MRI (report not avilable), but he has  a radiologist friend who read it as having meniscal damage. He is offered referral back to the orthopedist of his choice and will thnk about who he wants to see. He has upcoming shoulder surgery.     Knee pain, right 05/04/2017    Knee pain, right 04/03/2017    Low back pain     Medication monitoring encounter 04/19/2019    Meningioma 06/09/2014    Nasal polyp 10/06/2017    Neoplasm of uncertain behavior 10/11/2018    Neoplasm of uncertain behavior of skin 05/04/2015    Neuropathy 12/18/2014    Obesity     Orthopedic aftercare 06/12/2017    Other fatigue 12/4/2019    Overweight 12/12/2017    Pain in joint of right ankle 05/31/2017    Pain management     [facility]     Palpitations 12/22/2017    3-16-18- his symptom of palpitation have improved. Continue beta-blockers.     Pre-op exam 12/18/2018    Prostatic hypertrophy 06/09/2014    Benign    Puncture wound without foreign body of left hand, initial encounter 10/16/2018    Rash 06/28/2017    Rotator cuff tear 05/19/2014    left    Shortness of breath 02/02/2018    Sinus tachycardia 03/16/2018 12-14-18- much better. Continue current treat.     Tobacco use      Past Surgical History:   Procedure Laterality Date    ANKLE HARDWARE REMOVAL Right     APPENDECTOMY  1977    COLONOSCOPY      every 3 years with [facility]    EYE SURGERY Bilateral     cataracts    JOINT REPLACEMENT Right 2019    knee    KNEE ARTHROSCOPY  06/09/2017    and 12/5/2014. Dr Gallego. With partial medial and lateral miniscectomy.     KNEE ARTHROSCOPY Right 06/09/2017    KNEE ARTHROSCOPY W/ MENISCECTOMY Right 09/18/2018    right knee scope/meniscectomy    LUMBAR DISC SURGERY  11/11/2015    Right L3-4 diskectomy. Dr. Pollard.     ROTATOR CUFF REPAIR  07/2014    Dr Gallego.    SHOULDER SURGERY Left     THYROIDECTOMY  1971    TONSILLECTOMY  1966    TOTAL KNEE ARTHROPLASTY Right 01/16/2019    Right TKR    TOTAL KNEE ARTHROPLASTY Right 01/16/2019    Dr. Crain     Family History   Problem Relation Age  of Onset    Diabetes Mother     Stroke Mother     Other Mother         Thyroid Disease    Heart disease Mother         Ischemic    Cancer Mother     COPD Mother     Hypertension Father     Heart disease Father         Ischemic    Hyperlipidemia Father     Cancer Father     Arthritis Paternal Grandmother     Cancer Sister     Diabetes Maternal Grandmother     Hearing loss Sister      Social History     Socioeconomic History    Marital status:    Tobacco Use    Smoking status: Former     Current packs/day: 0.00     Average packs/day: 1 pack/day for 40.0 years (40.0 ttl pk-yrs)     Types: Cigarettes     Start date: 1980     Quit date: 2020     Years since quittin.1    Smokeless tobacco: Never    Tobacco comments:     quit 2020   Vaping Use    Vaping status: Never Used   Substance and Sexual Activity    Alcohol use: Yes     Types: 2 Glasses of wine per week    Drug use: No    Sexual activity: Not Currently     Partners: Female     No Known Allergies  Current Outpatient Medications   Medication Sig Dispense Refill    albuterol sulfate  (90 Base) MCG/ACT inhaler       atorvastatin (LIPITOR) 40 MG tablet Take 1 tablet by mouth Daily.      baclofen (LIORESAL) 10 MG tablet TAKE 1 TABLET TWICE DAILY AS NEEDED 60 tablet 2    fenofibrate (Tricor) 145 MG tablet Take 1 tablet by mouth Daily. 90 tablet 3    glucosamine-chondroitin 500-400 MG capsule capsule Take  by mouth 3 (Three) Times a Day With Meals.      levothyroxine (SYNTHROID, LEVOTHROID) 200 MCG tablet Take 1 tablet by mouth Daily. 90 tablet 3    metoprolol tartrate (LOPRESSOR) 50 MG tablet Take 1 tablet by mouth 2 (Two) Times a Day. (Patient taking differently: Take 0.5 tablets by mouth 2 (Two) Times a Day.) 180 tablet 0    pregabalin (LYRICA) 150 MG capsule Take 1 capsule by mouth Every 12 (Twelve) Hours.      valsartan (DIOVAN) 160 MG tablet  (Patient taking differently: Take 0.5 tablets by mouth Daily.)      diazePAM (Valium) 5 MG tablet  Take 1 tablet by mouth 2 (Two) Times a Day As Needed for Anxiety (take one before leaving home, then repeat as needed at MRI). (Patient not taking: Reported on 2/28/2025) 2 tablet 0    gabapentin (NEURONTIN) 600 MG tablet  (Patient not taking: Reported on 9/9/2024)      methylPREDNISolone (MEDROL) 4 MG dose pack Take as directed 21 tablet 0     Current Facility-Administered Medications   Medication Dose Route Frequency Provider Last Rate Last Admin    Lidocaine (Anorectal) (LMX 5) 5 % cream cream 1 Application  1 Application Topical TID PRN Fran Johnson, JOHAN         Review of Systems   All other systems reviewed and are negative.      OBJECTIVE     Vitals:    02/28/25 1321   BP: 128/80   Pulse: 62   Temp: 98 °F (36.7 °C)   SpO2: 94%       WBC   Date Value Ref Range Status   03/07/2022 6.98 3.40 - 10.80 10*3/mm3 Final   08/31/2020 4.7 3.4 - 10.8 x10E3/uL Final     RBC   Date Value Ref Range Status   03/07/2022 4.08 (L) 4.14 - 5.80 10*6/mm3 Final   08/31/2020 4.75 4.14 - 5.80 x10E6/uL Final     Hemoglobin   Date Value Ref Range Status   03/07/2022 12.7 (L) 13.0 - 17.7 g/dL Final     Hematocrit   Date Value Ref Range Status   03/07/2022 38.1 37.5 - 51.0 % Final     MCV   Date Value Ref Range Status   03/07/2022 93.4 79.0 - 97.0 fL Final     MCH   Date Value Ref Range Status   03/07/2022 31.1 26.6 - 33.0 pg Final     MCHC   Date Value Ref Range Status   03/07/2022 33.3 31.5 - 35.7 g/dL Final     RDW   Date Value Ref Range Status   03/07/2022 14.6 12.3 - 15.4 % Final     RDW-SD   Date Value Ref Range Status   03/07/2022 47.9 37.0 - 54.0 fl Final     MPV   Date Value Ref Range Status   03/07/2022 10.4 6.0 - 12.0 fL Final     Platelets   Date Value Ref Range Status   03/07/2022 148 140 - 450 10*3/mm3 Final     Platelets (Citrated Blood)   Date Value Ref Range Status   01/24/2022 117 (L) 140 - 450 10*3/mm3 Final     Neutrophil %   Date Value Ref Range Status   03/07/2022 64.7 42.7 - 76.0 % Final     Lymphocyte %    Date Value Ref Range Status   03/07/2022 22.1 19.6 - 45.3 % Final     Monocyte %   Date Value Ref Range Status   03/07/2022 8.7 5.0 - 12.0 % Final     Eosinophil %   Date Value Ref Range Status   03/07/2022 3.2 0.3 - 6.2 % Final     Basophil %   Date Value Ref Range Status   03/07/2022 1.3 0.0 - 1.5 % Final     Neutrophils, Absolute   Date Value Ref Range Status   03/07/2022 4.52 1.70 - 7.00 10*3/mm3 Final     Lymphocytes, Absolute   Date Value Ref Range Status   03/07/2022 1.54 0.70 - 3.10 10*3/mm3 Final     Monocytes, Absolute   Date Value Ref Range Status   03/07/2022 0.61 0.10 - 0.90 10*3/mm3 Final     Eosinophils, Absolute   Date Value Ref Range Status   03/07/2022 0.22 0.00 - 0.40 10*3/mm3 Final     Basophils, Absolute   Date Value Ref Range Status   03/07/2022 0.09 0.00 - 0.20 10*3/mm3 Final     nRBC   Date Value Ref Range Status   01/18/2022 0.1 0.0 - 0.2 /100 WBC Final         Lab Results   Component Value Date    GLUCOSE 149 (H) 01/18/2022    BUN 16 01/18/2022    CREATININE 1.05 01/18/2022    EGFRIFNONA 69 01/18/2022    EGFRIFAFRI 76 03/10/2021    BCR 15.2 01/18/2022    K 4.4 01/18/2022    CO2 23.0 01/18/2022    CALCIUM 9.1 01/18/2022    ALBUMIN 4.2 01/24/2022    AST 40 01/18/2022    ALT 42 (H) 01/18/2022       Patient seen in no apparent distress.      PHYSICAL EXAM:     Foot/Ankle Exam    RADIOLOGY:        No results found.    ASSESSMENT/PLAN     Diagnoses and all orders for this visit:    1. Other polyneuropathy (Primary)  -     Lidocaine (Anorectal) (LMX 5) 5 % cream cream 1 Application    2. Primary osteoarthritis involving multiple joints    Other orders  -     methylPREDNISolone (MEDROL) 4 MG dose pack; Take as directed  Dispense: 21 tablet; Refill: 0        Comprehensive lower extremity examination and evaluation was performed.    Assessment & Plan  The discomfort is likely due to a combination of pressure points on the foot, dry skin, and cracked skin. The presence of arthritis in the big toe  joint further complicates the condition. This behavior may be a result of his neuropathy. A prescription for a potent lidocaine cream has been provided, to be used as needed. He has been advised to avoid wearing tight shoes during workouts and to refrain from using compression socks during these sessions. A trial period of one to two weeks without the compression socks has been suggested to assess their impact on his symptoms. A 5-day course of prednisone has also been prescribed.  He reports that Lyrica is significantly more effective than gabapentin for managing his neuropathy. He has been advised to continue with Lyrica. The use of a compounding cream at bedtime has been discussed, and he reports it provides some relief.         Discussed findings and treatment plan including risks, benefits, and treatment options with patient in detail. Patient agreed with treatment plan.    Medications and allergies reviewed.  Reviewed available lab values along with other pertinent labs.  These were discussed with the patient.    An After Visit Summary was printed and given to the patient at discharge, including (if requested) any available informative/educational handouts regarding diagnosis, treatment, or medications. All questions were answered to patient/family satisfaction. Should symptoms fail to improve or worsen they agree to call or return to clinic or to go to the Emergency Department. Discussed the importance of following up with any needed screening tests/labs/specialist appointments and any requested follow-up recommended by me today. Importance of maintaining follow-up discussed and patient accepts that missed appointments can delay diagnosis and potentially lead to worsening of conditions.    No follow-ups on file., or sooner if acute issues arise.    Patient or patient representative verbalized consent for the use of Ambient Listening during the visit with  Fran Johnson DPM for chart documentation.  3/3/2025  13:27 EST    This document has been electronically signed by Fran Johnson DPM on March 3, 2025 13:28 EST

## 2025-05-15 ENCOUNTER — HOSPITAL ENCOUNTER (OUTPATIENT)
Dept: ULTRASOUND IMAGING | Facility: HOSPITAL | Age: 80
Discharge: HOME | End: 2025-05-15
Admitting: UROLOGY
Payer: MEDICARE

## 2025-05-15 DIAGNOSIS — N43.3 HYDROCELE, UNSPECIFIED HYDROCELE TYPE: ICD-10-CM

## 2025-05-15 PROCEDURE — 76870 US EXAM SCROTUM: CPT

## 2025-05-15 PROCEDURE — 93976 VASCULAR STUDY: CPT

## 2025-06-13 ENCOUNTER — OFFICE (AMBULATORY)
Dept: URBAN - METROPOLITAN AREA PATHOLOGY 19 | Facility: PATHOLOGY | Age: 80
End: 2025-06-13
Payer: MEDICARE

## 2025-06-13 DIAGNOSIS — D12.5 BENIGN NEOPLASM OF SIGMOID COLON: ICD-10-CM

## 2025-06-13 DIAGNOSIS — D12.3 BENIGN NEOPLASM OF TRANSVERSE COLON: ICD-10-CM

## 2025-06-13 DIAGNOSIS — D12.2 BENIGN NEOPLASM OF ASCENDING COLON: ICD-10-CM

## 2025-06-13 DIAGNOSIS — D12.0 BENIGN NEOPLASM OF CECUM: ICD-10-CM

## 2025-06-13 DIAGNOSIS — K62.1 RECTAL POLYP: ICD-10-CM

## 2025-06-13 PROBLEM — K57.30 DIVERTICULOSIS OF LARGE INTESTINE WITHOUT PERFORATION OR ABS: Status: ACTIVE | Noted: 2025-06-13

## 2025-06-13 PROBLEM — K63.5 POLYP OF COLON: Status: ACTIVE | Noted: 2025-06-13

## 2025-06-13 PROCEDURE — 88305 TISSUE EXAM BY PATHOLOGIST: CPT | Mod: 26 | Performed by: PATHOLOGY
